# Patient Record
Sex: FEMALE | Race: WHITE | NOT HISPANIC OR LATINO | Employment: OTHER | URBAN - METROPOLITAN AREA
[De-identification: names, ages, dates, MRNs, and addresses within clinical notes are randomized per-mention and may not be internally consistent; named-entity substitution may affect disease eponyms.]

---

## 2017-01-04 ENCOUNTER — OFFICE VISIT (OUTPATIENT)
Dept: INTERNAL MEDICINE | Facility: CLINIC | Age: 67
End: 2017-01-04
Payer: MEDICARE

## 2017-01-04 VITALS
DIASTOLIC BLOOD PRESSURE: 86 MMHG | OXYGEN SATURATION: 95 % | HEIGHT: 61 IN | WEIGHT: 154.13 LBS | RESPIRATION RATE: 12 BRPM | HEART RATE: 67 BPM | BODY MASS INDEX: 29.1 KG/M2 | SYSTOLIC BLOOD PRESSURE: 124 MMHG

## 2017-01-04 DIAGNOSIS — E78.5 HYPERLIPIDEMIA, UNSPECIFIED HYPERLIPIDEMIA TYPE: ICD-10-CM

## 2017-01-04 DIAGNOSIS — Z86.2 HISTORY OF ANEMIA: ICD-10-CM

## 2017-01-04 DIAGNOSIS — R00.2 PALPITATIONS: ICD-10-CM

## 2017-01-04 DIAGNOSIS — Z00.00 HEALTH CARE MAINTENANCE: ICD-10-CM

## 2017-01-04 DIAGNOSIS — R41.3 MEMORY LOSS: ICD-10-CM

## 2017-01-04 DIAGNOSIS — K21.00 GASTROESOPHAGEAL REFLUX DISEASE WITH ESOPHAGITIS: ICD-10-CM

## 2017-01-04 DIAGNOSIS — I38 VALVULAR HEART DISEASE: Primary | ICD-10-CM

## 2017-01-04 PROCEDURE — 90670 PCV13 VACCINE IM: CPT | Mod: PBBFAC,PO | Performed by: INTERNAL MEDICINE

## 2017-01-04 PROCEDURE — 99999 PR PBB SHADOW E&M-EST. PATIENT-LVL III: CPT | Mod: PBBFAC,,, | Performed by: INTERNAL MEDICINE

## 2017-01-04 PROCEDURE — 99213 OFFICE O/P EST LOW 20 MIN: CPT | Mod: PBBFAC,PO | Performed by: INTERNAL MEDICINE

## 2017-01-04 PROCEDURE — 99214 OFFICE O/P EST MOD 30 MIN: CPT | Mod: S$PBB,,, | Performed by: INTERNAL MEDICINE

## 2017-01-04 RX ORDER — PANTOPRAZOLE SODIUM 40 MG/1
40 TABLET, DELAYED RELEASE ORAL DAILY
Qty: 90 TABLET | Refills: 1 | Status: SHIPPED | OUTPATIENT
Start: 2017-01-04 | End: 2017-08-02 | Stop reason: SDUPTHER

## 2017-01-04 NOTE — PROGRESS NOTES
HISTORY OF PRESENT ILLNESS:  PtMundo is a 66 y.o. female presents for monitoring of her elevated LFTs, anemia, memory loss.  She has seen Dr. Nassar for her memory loss.  She had done iron infusions with Dr. Weiss, last ferritin elevated.  Has been d/c'd from that clinic.  Her last LFTs had decreased slightly from previous.  She is also on ambien 5 mg po qhs prn for insominia and celexa 20 mg po q day.  Is on livalo and fibricor for her mixed hyperlipidemia.  She has been found to have a fatty liver on CT.  She has been having palpitations, sees Dr. Ferrara.  States palpitations noted when laying on side, occurring q 2 weeks.  Has mammogram scheduled.      Lab Results   Component Value Date    WBC 5.16 11/28/2016    HGB 13.7 11/28/2016    HCT 38.4 11/28/2016     11/28/2016    CHOL 176 03/01/2016    TRIG 133 03/01/2016    HDL 36 (L) 03/01/2016    ALT 54 (H) 11/28/2016    AST 34 11/28/2016     11/28/2016    K 4.1 11/28/2016     11/28/2016    CREATININE 1.1 11/28/2016    BUN 27 (H) 11/28/2016    CO2 25 11/28/2016    TSH 2.795 03/01/2016    INR 1.1 07/16/2012     Lab Results   Component Value Date    LDLCALC 113.4 03/01/2016           ROS:  GENERAL: No fever, chills, fatigability or weight loss.  SKIN: No rashes, itching or changes in color or texture of skin.  HEAD: No headaches or recent head trauma.  EARS: Denies ear pain, discharge or vertigo.  NOSE: No loss of smell, no epistaxis or postnasal drip.  MOUTH & THROAT: No hoarseness or change in voice. No excessive gum bleeding.  NODES: Denies swollen glands.  CHEST: Denies WEST, cyanosis, wheezing, cough and sputum production.  CARDIOVASCULAR: Denies chest pain, PND, orthopnea or reduced exercise tolerance; occ palpitations;  ABDOMEN: Appetite fine. No weight loss. Denies constipation, diarrhea, abdominal pain, hematemesis or blood in stool.  URINARY: No flank pain, dysuria or hematuria.  PERIPHERAL VASCULAR: No claudication or cyanosis. No  edema.  MUSCULOSKELETAL: No joint stiffness or swelling. Denies back pain.  NEUROLOGIC: Denies numbness    PE:   Vitals:   Vitals:    01/04/17 0959   BP: 124/86   Pulse: 67   Resp: 12     GENERAL: no acute distress, A&Ox3, comfortable.  Female with BMI of 29   HEENT: tympanic membranes clear, nasal mucosa pink, no pharyngeal erythema or exudate  NECK: supple, no cervical lymphadenopathy, no thyromegaly; no supraclavicular nodes;   CHEST:  clear to auscultation bilaterally, no crackles or wheeze; no increased work of breathing;  CARDIOVASCULAR: regular rate and rhythm, no rubs, murmurs or gallops.  ABDOMEN: normal bowel sounds, soft non-tender, non-distended; no palpable organomegaly;   EXT: no clubbing, cyanosis or edema.     ASSESSMENT/PLAN:    Valvular heart disease  -     Ambulatory referral to Cardiology    Palpitations  -     Ambulatory referral to Cardiology    Gastroesophageal reflux disease with esophagitis  -     pantoprazole (PROTONIX) 40 MG tablet; Take 1 tablet (40 mg total) by mouth once daily.  Dispense: 90 tablet; Refill: 1    Hyperlipidemia, unspecified hyperlipidemia type    History of anemia    Memory loss    Health care maintenance  -     Pneumococcal Conjugate Vaccine (13 Valent) (IM)      Will be following up with cardiology, Dr. Ferrara, he has labs ordered for Lipid panel;    She also has lab orders with Dr. Ferrara for followup for anemia;    Memory Loss: on celexa through Dr. Nassar;     Call if condition changes or worsens.

## 2017-01-04 NOTE — MR AVS SNAPSHOT
[facility]  [address] [facility] [address]  [facility] [address]  Phone: [phone]  Fax: [phone]                  Cindy Escobedo   2017 10:00 AM   Office Visit    Description:  Female : 1950   Provider:  Cindy Roman MD   Department:  [facility]           Reason for Visit     Follow-up           Diagnoses this Visit        Comments    Valvular heart disease    -  Primary     Palpitations         Gastroesophageal reflux disease with esophagitis         Health care maintenance         Hyperlipidemia, unspecified hyperlipidemia type         History of anemia                To Do List           Future Appointments        Provider Department Dept Phone    1/10/2017 2:45 PM [facility] [phone]    3/14/2017 10:00 AM Josh Ferrara MD [facility] [phone]      Goals (5 Years of Data)     None       These Medications        Disp Refills Start End    pantoprazole (PROTONIX) 40 MG tablet 90 tablet 1 2017    Take 1 tablet (40 mg total) by mouth once daily. - Oral    Pharmacy: [facility] [address] [place] [address] [address] [facility] [address] Ph #: [phone]         [facility]     [facility] On Call Nurse Care Line -  Assistance  Registered nurses in the [facility] On Call Center provide clinical advisement, health education, appointment booking, and other advisory services.  Call for this free service at [phone].             Medications           Message regarding Medications     Verify the changes and/or additions to your medication regime listed below are the same as discussed with your clinician today.  If any of these changes or additions are incorrect, please notify your healthcare provider.             Verify that the below list of medications is an accurate representation of the medications you are currently taking.  If none reported, the list may be  "blank. If incorrect, please contact your healthcare provider. Carry this list with you in case of emergency.           Current Medications     citalopram (CELEXA) 20 MG tablet 20 mg once daily.     fenofibric acid (FIBRICOR) 45 mg CpDR TAKE 1 CAPSULE BY MOUTH EVERY DAY    LIVALO 2 mg Tab tablet TAKE 1 TABLET BY MOUTH EVERY EVENING    metoprolol succinate (TOPROL-XL) 50 MG 24 hr tablet TAKE 1 TABLET BY MOUTH EVERY MORNING    multivitamin (THERAGRAN) per tablet Every day    pantoprazole (PROTONIX) 40 MG tablet Take 1 tablet (40 mg total) by mouth once daily.    valsartan-hydrochlorothiazide (DIOVAN-HCT) 320-12.5 mg per tablet TAKE 1 TABLET BY MOUTH EVERY MORNING    zolpidem (AMBIEN) 5 MG Tab nightly as needed.     ferrous gluconate (IRON HIGH POTENCY) 240 (27 FE) MG tablet Take 240 mg by mouth 3 (three) times daily with meals.    valsartan-hydrochlorothiazide (DIOVAN-HCT) 320-12.5 mg per tablet Take 1 tablet by mouth every morning. Needs appt for further refills. Appt due in March 2017           Clinical Reference Information           Vital Signs - Last Recorded  Most recent update: 1/4/2017 10:02 AM by Sveta Zavala LPN    BP Pulse Resp Ht Wt SpO2    124/86 (BP Location: Left arm, Patient Position: Sitting) 67 12 5' 1" (1.549 m) 69.9 kg (154 lb 1.6 oz) 95%    BMI                29.12 kg/m2          Blood Pressure          Most Recent Value    BP  124/86      Allergies as of 1/4/2017     Codeine    Ace Inhibitors    Cat Hair Extract    Epinephrine    Hydrocodone-acetaminophen    Lipitor [Atorvastatin]    Oxycodone Hcl-oxycodone-asa    Rosuvastatin    Sulfamethoxazole-trimethoprim    Terramycin  [Oxytetracycline]      Immunizations Administered on Date of Encounter - 1/4/2017     Name Date Dose VIS Date Route    Pneumococcal Conjugate - 13 Valent 1/4/2017 0.5 mL 11/5/2015 Intramuscular      Orders Placed During Today's Visit      Normal Orders This Visit    Ambulatory referral to Cardiology     Pneumococcal Conjugate " Vaccine (13 Valent) (IM)

## 2017-01-10 ENCOUNTER — HOSPITAL ENCOUNTER (OUTPATIENT)
Dept: RADIOLOGY | Facility: HOSPITAL | Age: 67
Discharge: HOME | End: 2017-01-10
Attending: INTERNAL MEDICINE
Payer: MEDICARE

## 2017-01-10 DIAGNOSIS — Z12.31 VISIT FOR SCREENING MAMMOGRAM: ICD-10-CM

## 2017-01-10 PROCEDURE — 77063 BREAST TOMOSYNTHESIS BI: CPT | Mod: 26,,, | Performed by: RADIOLOGY

## 2017-01-10 PROCEDURE — 77067 SCR MAMMO BI INCL CAD: CPT | Mod: TC

## 2017-01-10 PROCEDURE — 77067 SCR MAMMO BI INCL CAD: CPT | Mod: 26,,, | Performed by: RADIOLOGY

## 2017-03-06 RX ORDER — VALSARTAN AND HYDROCHLOROTHIAZIDE 320; 12.5 MG/1; MG/1
1 TABLET, FILM COATED ORAL EVERY MORNING
Qty: 90 TABLET | Refills: 0 | Status: SHIPPED | OUTPATIENT
Start: 2017-03-06 | End: 2017-03-14 | Stop reason: SDUPTHER

## 2017-03-14 ENCOUNTER — OFFICE VISIT (OUTPATIENT)
Dept: CARDIOLOGY | Facility: CLINIC | Age: 67
End: 2017-03-14
Payer: MEDICARE

## 2017-03-14 VITALS
HEIGHT: 61 IN | HEART RATE: 66 BPM | DIASTOLIC BLOOD PRESSURE: 82 MMHG | WEIGHT: 149.94 LBS | BODY MASS INDEX: 28.31 KG/M2 | SYSTOLIC BLOOD PRESSURE: 128 MMHG

## 2017-03-14 DIAGNOSIS — I72.6 VERTEBRAL ARTERY ANEURYSM: Primary | ICD-10-CM

## 2017-03-14 DIAGNOSIS — E78.5 DYSLIPIDEMIA: Chronic | ICD-10-CM

## 2017-03-14 DIAGNOSIS — R00.2 PALPITATIONS: Chronic | ICD-10-CM

## 2017-03-14 DIAGNOSIS — I65.23 BILATERAL CAROTID ARTERY STENOSIS: Chronic | ICD-10-CM

## 2017-03-14 PROCEDURE — 99213 OFFICE O/P EST LOW 20 MIN: CPT | Mod: PBBFAC,PO | Performed by: INTERNAL MEDICINE

## 2017-03-14 PROCEDURE — 99214 OFFICE O/P EST MOD 30 MIN: CPT | Mod: S$PBB,,, | Performed by: INTERNAL MEDICINE

## 2017-03-14 PROCEDURE — 99999 PR PBB SHADOW E&M-EST. PATIENT-LVL III: CPT | Mod: PBBFAC,,, | Performed by: INTERNAL MEDICINE

## 2017-03-14 RX ORDER — FENOFIBRIC ACID 45 MG/1
1 CAPSULE, DELAYED RELEASE ORAL DAILY
Qty: 90 CAPSULE | Refills: 4 | Status: SHIPPED | OUTPATIENT
Start: 2017-03-14 | End: 2018-04-09 | Stop reason: SDUPTHER

## 2017-03-14 RX ORDER — VALSARTAN AND HYDROCHLOROTHIAZIDE 320; 12.5 MG/1; MG/1
1 TABLET, FILM COATED ORAL EVERY MORNING
Qty: 90 TABLET | Refills: 4 | Status: SHIPPED | OUTPATIENT
Start: 2017-03-14 | End: 2018-04-23

## 2017-03-14 RX ORDER — PITAVASTATIN CALCIUM 2.09 MG/1
2 TABLET, FILM COATED ORAL NIGHTLY
Qty: 90 TABLET | Refills: 4 | Status: SHIPPED | OUTPATIENT
Start: 2017-03-14 | End: 2018-03-19 | Stop reason: SDUPTHER

## 2017-03-14 NOTE — LETTER
March 14, 2017      Cindy Roman MD  [address] [facility] [address] [place] [address]           [facility]  [address] [place] [address]  Phone: [phone]          Patient: Cindy Escobedo   MR Number: [id number]   YOB: 1950   Date of Visit: 3/14/2017       Dear Dr. Cindy oRman:    Thank you for referring Cindy Escobedo to me for evaluation. Attached you will find relevant portions of my assessment and plan of care.    If you have questions, please do not hesitate to call me. I look forward to following Cindy Escobedo along with you.    Sincerely,    Josh Ferrara MD    Enclosure  CC:  No Recipients    If you would like to receive this communication electronically, please contact [email or web address] or [phone] to request more information on [facility] access.    For providers and/or their staff who would like to refer a patient to [facility], please contact us through our one-stop-shop provider referral line, [facility], at [phone].    If you feel you have received this communication in error or would no longer like to receive these types of communications, please e-mail [email or web address]

## 2017-03-14 NOTE — PROGRESS NOTES
Subjective:    Patient ID:  Cindy Escobedo is a 67 y.o. female who presents for follow-up of Palpitations (Annual f/u  )      HPI   Ms. Escobedo here for follow up of palpitations, DLP, vertebral artery aneurysm.  Patients states is doing well no chest pain, SOB or change in exertional tolerence. Patient does not exercise but remains very active with out change in exertional tolerance or chest pain.     Review of Systems   Constitution: Negative for chills, decreased appetite, weakness and night sweats.   HENT: Negative for headaches and nosebleeds.    Eyes: Negative for blurred vision and visual disturbance.   Cardiovascular: Negative for chest pain, claudication, cyanosis, dyspnea on exertion, irregular heartbeat, leg swelling, near-syncope, orthopnea, palpitations, paroxysmal nocturnal dyspnea and syncope.   Respiratory: Negative for cough and shortness of breath.    Endocrine: Negative for polyuria.   Hematologic/Lymphatic: Does not bruise/bleed easily.   Skin: Negative for flushing and rash.   Musculoskeletal: Negative for arthritis, joint pain, muscle cramps and myalgias.   Gastrointestinal: Negative for abdominal pain, change in bowel habit and heartburn.   Genitourinary: Negative for bladder incontinence.   Neurological: Negative for dizziness, light-headedness and loss of balance.   Psychiatric/Behavioral: Negative for altered mental status.        Objective:    Physical Exam   Constitutional: She is oriented to person, place, and time. She appears well-developed and well-nourished.   HENT:   Head: Normocephalic.   Eyes: Conjunctivae are normal.   Neck: Normal range of motion. Neck supple. No JVD present.   Cardiovascular: Normal rate, regular rhythm, normal heart sounds and intact distal pulses.    Pulses:       Carotid pulses are 2+ on the right side, and 2+ on the left side.       Radial pulses are 2+ on the right side, and 2+ on the left side.        Dorsalis pedis pulses are 2+ on the right side, and 2+  on the left side.        Posterior tibial pulses are 2+ on the right side, and 2+ on the left side.   Pulmonary/Chest: Effort normal and breath sounds normal.   Abdominal: Soft. Bowel sounds are normal.   Musculoskeletal: She exhibits no edema or tenderness.   Neurological: She is alert and oriented to person, place, and time. Gait normal.   Skin: Skin is warm, dry and intact. No cyanosis. Nails show no clubbing.   Psychiatric: She has a normal mood and affect. Her speech is normal and behavior is normal. Thought content normal.             ..    Chemistry        Component Value Date/Time     11/28/2016 1015    K 4.1 11/28/2016 1015     11/28/2016 1015    CO2 25 11/28/2016 1015    BUN 27 (H) 11/28/2016 1015    CREATININE 1.1 11/28/2016 1015     (H) 11/28/2016 1015        Component Value Date/Time    CALCIUM 10.2 11/28/2016 1015    ALKPHOS 63 11/28/2016 1015    AST 34 11/28/2016 1015    ALT 54 (H) 11/28/2016 1015    BILITOT 1.1 (H) 11/28/2016 1015            ..  Lab Results   Component Value Date    CHOL 176 03/01/2016    CHOL 217 (H) 01/06/2015    CHOL 177 02/27/2014     Lab Results   Component Value Date    HDL 36 (L) 03/01/2016    HDL 39 (L) 01/06/2015    HDL 37 (L) 02/27/2014     Lab Results   Component Value Date    LDLCALC 113.4 03/01/2016    LDLCALC 147.4 01/06/2015    LDLCALC 106.2 02/27/2014     Lab Results   Component Value Date    TRIG 133 03/01/2016    TRIG 153 (H) 01/06/2015    TRIG 169 (H) 02/27/2014     Lab Results   Component Value Date    CHOLHDL 20.5 03/01/2016    CHOLHDL 18.0 (L) 01/06/2015    CHOLHDL 20.9 02/27/2014     ..  Lab Results   Component Value Date    WBC 5.16 11/28/2016    HGB 13.7 11/28/2016    HCT 38.4 11/28/2016    MCV 91 11/28/2016     11/28/2016       Test(s) Reviewed  I have reviewed the following in detail:  [] Stress test   [] Angiography   [x] Echocardiogram   [x] Labs   [x] Other:       Assessment:         ICD-10-CM ICD-9-CM   1. Vertebral artery  aneurysm I72.6 442.81   2. Bilateral carotid artery stenosis I65.23 433.10     433.30   3. Dyslipidemia E78.5 272.4   4. Palpitations R00.2 785.1     Problem List Items Addressed This Visit     Carotid artery stenosis Mild (Chronic)    Dyslipidemia (Chronic)    Palpitations (Chronic)    Overview     05/10/2001 all coronaries normal;          Vertebral artery aneurysm - Primary    Overview     7/2012 angio  4.5 millimeter diameter fusiform left V4 segment aneurysm  Minimal intracranial atherosclerosis                Plan:           Return to clinic 1 year   Aerobic exercise 5x's/wk. Heart healthy diet and risk factor modification.    See labs and med orders.  Has neuro f/u 4/17 eval ANA stenosis.

## 2017-03-14 NOTE — MR AVS SNAPSHOT
[facility]  [address]  [facility] [address]  Phone: [phone]                  Cindy Escobedo   3/14/2017 10:00 AM   Office Visit    Description:  Female : 1950   Provider:  Josh Ferrara MD   Department:  [facility]           Reason for Visit     Palpitations           Diagnoses this Visit        Comments    Vertebral artery aneurysm    -  Primary     Bilateral carotid artery stenosis         Dyslipidemia         Palpitations                To Do List           Goals (5 Years of Data)     None      Follow-Up and Disposition     Return in about 9 months (around 2017).       These Medications        Disp Refills Start End    fenofibric acid (FIBRICOR) 45 mg CpDR 90 capsule 4 3/14/2017     Take 1 capsule (45 mg total) by mouth once daily. - Oral    Pharmacy: [facility] [place] [address] AT [facility] [address] Ph #: [phone]       valsartan-hydrochlorothiazide (DIOVAN-HCT) 320-12.5 mg per tablet 90 tablet 4 3/14/2017     Take 1 tablet by mouth every morning. Needs appt for further refills. Appt due in 2017 - Oral    Pharmacy: [facility] [place] [address] AT [facility] OF [facility] [address] Ph #: [phone]       pitavastatin (LIVALO) 2 mg Tab tablet 90 tablet 4 3/14/2017     Take 1 tablet (2 mg total) by mouth every evening. - Oral    Pharmacy: [facility] [place] [address] AT [facility] [address] Ph #: [phone]         [facility] On Call     [facility] On Call Nurse Care Line -  Assistance  Registered nurses in the [facility] On Call Center provide clinical advisement, health education, appointment booking, and other advisory services.  Call for this free service at [phone].             Medications           Message regarding Medications     Verify the changes and/or additions to your medication regime listed below are the  "same as discussed with your clinician today.  If any of these changes or additions are incorrect, please notify your healthcare provider.        CHANGE how you are taking these medications     Start Taking Instead of    fenofibric acid (FIBRICOR) 45 mg CpDR fenofibric acid (FIBRICOR) 45 mg CpDR    Dosage:  Take 1 capsule (45 mg total) by mouth once daily. Dosage:  TAKE 1 CAPSULE BY MOUTH EVERY DAY    Reason for Change:  Reorder     pitavastatin (LIVALO) 2 mg Tab tablet LIVALO 2 mg Tab tablet    Dosage:  Take 1 tablet (2 mg total) by mouth every evening. Dosage:  TAKE 1 TABLET BY MOUTH EVERY EVENING    Reason for Change:  Reorder            Verify that the below list of medications is an accurate representation of the medications you are currently taking.  If none reported, the list may be blank. If incorrect, please contact your healthcare provider. Carry this list with you in case of emergency.           Current Medications     citalopram (CELEXA) 20 MG tablet 20 mg once daily.     fenofibric acid (FIBRICOR) 45 mg CpDR Take 1 capsule (45 mg total) by mouth once daily.    ferrous gluconate (IRON HIGH POTENCY) 240 (27 FE) MG tablet Take 240 mg by mouth 3 (three) times daily with meals.    metoprolol succinate (TOPROL-XL) 50 MG 24 hr tablet TAKE 1 TABLET BY MOUTH EVERY MORNING    multivitamin (THERAGRAN) per tablet Every day    pantoprazole (PROTONIX) 40 MG tablet Take 1 tablet (40 mg total) by mouth once daily.    pitavastatin (LIVALO) 2 mg Tab tablet Take 1 tablet (2 mg total) by mouth every evening.    valsartan-hydrochlorothiazide (DIOVAN-HCT) 320-12.5 mg per tablet Take 1 tablet by mouth every morning. Needs appt for further refills. Appt due in March 2017    zolpidem (AMBIEN) 5 MG Tab nightly as needed.            Clinical Reference Information           Your Vitals Were     BP Pulse Height Weight BMI    128/82 (BP Location: Left arm, Patient Position: Sitting, BP Method: Automatic) 66 5' 1" (1.549 m) 68 kg (149 " lb 14.6 oz) 28.33 kg/m2      Blood Pressure          Most Recent Value    BP  128/82      Allergies as of 3/14/2017     Codeine    Ace Inhibitors    Cat Hair Extract    Epinephrine    Hydrocodone-acetaminophen    Lipitor [Atorvastatin]    Oxycodone Hcl-oxycodone-asa    Rosuvastatin    Sulfamethoxazole-trimethoprim    Terramycin  [Oxytetracycline]      Immunizations Administered on Date of Encounter - 3/14/2017     None      Orders Placed During Today's Visit     Future Labs/Procedures Expected by Expires    CK  12/14/2017 3/15/2018    Comprehensive metabolic panel  12/14/2017 3/15/2018    Lipid panel  12/14/2017 3/15/2018      Language Assistance Services     ATTENTION: Language assistance services are available, free of charge. Please call [phone].      ATENCIÓN: Si nedla abbi, tiene a dumas disposición servicios gratuitos de asistencia lingüística. Llame al [phone].     CHÚ Ý: N?u b?n nói Ti?ng Vi?t, có các d?ch v? h? tr? ngôn ng? mi?n phí dành cho b?n. G?i s? [phone].         [facility] complies with applicable Federal civil rights laws and does not discriminate on the basis of race, color, national origin, age, disability, or sex.

## 2017-04-05 ENCOUNTER — HOSPITAL ENCOUNTER (OUTPATIENT)
Dept: RADIOLOGY | Facility: HOSPITAL | Age: 67
Discharge: HOME | End: 2017-04-05
Attending: NURSE PRACTITIONER
Payer: MEDICARE

## 2017-04-05 ENCOUNTER — OFFICE VISIT (OUTPATIENT)
Dept: FAMILY MEDICINE | Facility: CLINIC | Age: 67
End: 2017-04-05
Payer: MEDICARE

## 2017-04-05 VITALS
DIASTOLIC BLOOD PRESSURE: 96 MMHG | BODY MASS INDEX: 28.51 KG/M2 | HEIGHT: 61 IN | OXYGEN SATURATION: 96 % | WEIGHT: 151 LBS | HEART RATE: 70 BPM | SYSTOLIC BLOOD PRESSURE: 126 MMHG

## 2017-04-05 DIAGNOSIS — R10.9 LEFT SIDED ABDOMINAL PAIN: Primary | ICD-10-CM

## 2017-04-05 DIAGNOSIS — R10.9 LEFT SIDED ABDOMINAL PAIN: ICD-10-CM

## 2017-04-05 PROCEDURE — 74177 CT ABD & PELVIS W/CONTRAST: CPT | Mod: TC,PO

## 2017-04-05 PROCEDURE — 99213 OFFICE O/P EST LOW 20 MIN: CPT | Mod: PBBFAC,PO | Performed by: NURSE PRACTITIONER

## 2017-04-05 PROCEDURE — 25500020 PHARM REV CODE 255: Mod: PO | Performed by: NURSE PRACTITIONER

## 2017-04-05 PROCEDURE — 74177 CT ABD & PELVIS W/CONTRAST: CPT | Mod: 26,,, | Performed by: RADIOLOGY

## 2017-04-05 PROCEDURE — 99213 OFFICE O/P EST LOW 20 MIN: CPT | Mod: S$PBB,,, | Performed by: NURSE PRACTITIONER

## 2017-04-05 PROCEDURE — 99999 PR PBB SHADOW E&M-EST. PATIENT-LVL III: CPT | Mod: PBBFAC,,, | Performed by: NURSE PRACTITIONER

## 2017-04-05 RX ADMIN — IOHEXOL 75 ML: 350 INJECTION, SOLUTION INTRAVENOUS at 03:04

## 2017-04-05 RX ADMIN — IOHEXOL 30 ML: 350 INJECTION, SOLUTION INTRAVENOUS at 03:04

## 2017-04-05 NOTE — PROGRESS NOTES
Subjective:       Patient ID: Cindy Escobedo is a 67 y.o. female.    Chief Complaint: Abdominal Pain and Constipation    Abdominal Pain   This is a new (2-3 days) problem. The onset quality is sudden. The pain is located in the LUQ. The pain is moderate. The abdominal pain does not radiate. Associated symptoms include constipation (last normal BM yesterday). Pertinent negatives include no anorexia, arthralgias, belching, diarrhea, dysuria, fever, flatus, frequency, headaches, hematochezia, hematuria, melena, myalgias, nausea, vomiting or weight loss. Relieved by: TUMs  She has tried nothing for the symptoms.     Review of Systems   Constitutional: Negative for fever and weight loss.   Gastrointestinal: Positive for abdominal pain and constipation (last normal BM yesterday). Negative for anorexia, diarrhea, flatus, hematochezia, melena, nausea and vomiting.   Genitourinary: Negative for dysuria, frequency and hematuria.   Musculoskeletal: Negative for arthralgias and myalgias.   Neurological: Negative for headaches.       Objective:      Physical Exam   Constitutional: She is oriented to person, place, and time. She appears well-nourished.   Cardiovascular: Normal rate, regular rhythm, normal heart sounds and intact distal pulses.    Pulmonary/Chest: Effort normal and breath sounds normal. She has no wheezes. She has no rales.   Abdominal: Soft. Bowel sounds are normal. There is tenderness in the left upper quadrant. There is no rigidity, no rebound, no guarding and no CVA tenderness.   Neurological: She is alert and oriented to person, place, and time.   Skin: Skin is warm. No rash noted.   Vitals reviewed.      Assessment:       1. Left sided abdominal pain        Plan:       Cindy was seen today for abdominal pain and constipation.    Diagnoses and all orders for this visit:    Left sided abdominal pain  -     CT Abdomen Pelvis W Wo Contrast; Future  -     Basic metabolic panel; Future  Monitor diet and avoid  triggers  Adequate rest and hydration  ER for increasing or worsening symptoms

## 2017-06-12 RX ORDER — VALSARTAN AND HYDROCHLOROTHIAZIDE 320; 12.5 MG/1; MG/1
TABLET, FILM COATED ORAL
Qty: 90 TABLET | Refills: 0 | Status: SHIPPED | OUTPATIENT
Start: 2017-06-12 | End: 2017-08-23 | Stop reason: SDUPTHER

## 2017-06-21 ENCOUNTER — OFFICE VISIT (OUTPATIENT)
Dept: OPTOMETRY | Facility: CLINIC | Age: 67
End: 2017-06-21
Payer: MEDICARE

## 2017-06-21 DIAGNOSIS — H25.13 NUCLEAR SCLEROSIS, BILATERAL: Primary | ICD-10-CM

## 2017-06-21 PROCEDURE — 99999 PR PBB SHADOW E&M-EST. PATIENT-LVL II: CPT | Mod: PBBFAC,,, | Performed by: OPTOMETRIST

## 2017-06-21 PROCEDURE — 92014 COMPRE OPH EXAM EST PT 1/>: CPT | Mod: S$PBB,,, | Performed by: OPTOMETRIST

## 2017-06-21 PROCEDURE — 99212 OFFICE O/P EST SF 10 MIN: CPT | Mod: PBBFAC,PO | Performed by: OPTOMETRIST

## 2017-06-21 NOTE — PROGRESS NOTES
HPI     Routine eye exam--dle--6/2016    Pt denies any problems since last visit-no va changes--forgot glasses at   home today. Denies eye pain, flashes and floaters.    Last edited by Mignon Pavon on 6/21/2017  8:31 AM. (History)        ROS     Negative for: Constitutional, Gastrointestinal, Neurological, Skin,   Genitourinary, Musculoskeletal, HENT, Endocrine, Cardiovascular, Eyes,   Respiratory, Psychiatric, Allergic/Imm, Heme/Lymph    Last edited by Tej Kirby, OD on 6/21/2017  9:02 AM. (History)        Assessment /Plan     For exam results, see Encounter Report.    Nuclear sclerosis, bilateral      1. Educated pt on presence of cataracts and effects on vision. No surgery at this time. Recheck in one year.

## 2017-08-01 DIAGNOSIS — K21.00 GASTROESOPHAGEAL REFLUX DISEASE WITH ESOPHAGITIS: ICD-10-CM

## 2017-08-02 RX ORDER — PANTOPRAZOLE SODIUM 40 MG/1
TABLET, DELAYED RELEASE ORAL
Qty: 90 TABLET | Refills: 0 | Status: SHIPPED | OUTPATIENT
Start: 2017-08-02 | End: 2017-12-16 | Stop reason: SDUPTHER

## 2017-08-17 ENCOUNTER — OFFICE VISIT (OUTPATIENT)
Dept: GASTROENTEROLOGY | Facility: CLINIC | Age: 67
End: 2017-08-17
Payer: MEDICARE

## 2017-08-17 VITALS — WEIGHT: 156 LBS | HEIGHT: 60 IN | BODY MASS INDEX: 30.63 KG/M2

## 2017-08-17 DIAGNOSIS — K22.70 BARRETT'S ESOPHAGUS WITHOUT DYSPLASIA: ICD-10-CM

## 2017-08-17 DIAGNOSIS — R13.19 ESOPHAGEAL DYSPHAGIA: ICD-10-CM

## 2017-08-17 DIAGNOSIS — K21.9 GASTROESOPHAGEAL REFLUX DISEASE, ESOPHAGITIS PRESENCE NOT SPECIFIED: Primary | ICD-10-CM

## 2017-08-17 PROCEDURE — 99999 PR PBB SHADOW E&M-EST. PATIENT-LVL II: CPT | Mod: PBBFAC,,, | Performed by: INTERNAL MEDICINE

## 2017-08-17 PROCEDURE — 1126F AMNT PAIN NOTED NONE PRSNT: CPT | Mod: ,,, | Performed by: INTERNAL MEDICINE

## 2017-08-17 PROCEDURE — 99212 OFFICE O/P EST SF 10 MIN: CPT | Mod: PBBFAC,PO | Performed by: INTERNAL MEDICINE

## 2017-08-17 PROCEDURE — 99213 OFFICE O/P EST LOW 20 MIN: CPT | Mod: S$PBB,,, | Performed by: INTERNAL MEDICINE

## 2017-08-17 PROCEDURE — 1159F MED LIST DOCD IN RCRD: CPT | Mod: ,,, | Performed by: INTERNAL MEDICINE

## 2017-08-17 NOTE — PROGRESS NOTES
Pt presents for f/u GERD/Maldonado's. Pt describes frequent coughing spells past several years. Positive intermittent dysphagia, mainly solids. No vomiting. Taking protonix. No fever or jaundice. No bleeding. Appetite and weight stable. Last EGD 2014 notable for reflux esophagitis with Maldonado's, LE ring (dilated), and gastritis. Pt received letter due for f/u EGD.    REVIEW OF SYSTEMS:   Constitutional: Negative for fever, appetite change and unexpected weight change.  HENT: Negative for sore throat and trouble swallowing.  Eyes: Negative for visual disturbance.  Respiratory: Negative for chest tightness, shortness of breath and wheezing.  Cardiovascular: Negative for chest pain.  Gastrointestinal:  as per HPI    PHYSICAL EXAMINATION:                                                        GENERAL:  Comfortable, in no acute distress.      SKIN: Non-jaundiced.                             HEENT EXAM:  Nonicteric.  No adenopathy.  Oropharynx is clear.               NECK:  Supple.                                                               LUNGS:  Clear.                                                               CARDIAC:  Regular rate and rhythm.  S1, S2.  No murmur.                      ABDOMEN:  Soft, positive bowel sounds, nontender.  No hepatosplenomegaly or masses.  No rebound or guarding.                                             EXTREMITIES:  No edema.       IMP: 1. GERD with Maldonado's esophagus.          2. Dysphagia          3. Hx LE ring          4. Cough - not sure if related to reflux.    PLAN: 1. EGD/biopsies/dilation.             2. Likely try different PPI following exam.

## 2017-08-29 ENCOUNTER — ANESTHESIA EVENT (OUTPATIENT)
Dept: ENDOSCOPY | Facility: HOSPITAL | Age: 67
End: 2017-08-29
Payer: MEDICARE

## 2017-08-29 ENCOUNTER — HOSPITAL ENCOUNTER (OUTPATIENT)
Facility: HOSPITAL | Age: 67
Discharge: HOME | End: 2017-08-29
Attending: INTERNAL MEDICINE | Admitting: INTERNAL MEDICINE
Payer: MEDICARE

## 2017-08-29 ENCOUNTER — SURGERY (OUTPATIENT)
Age: 67
End: 2017-08-29

## 2017-08-29 ENCOUNTER — ANESTHESIA (OUTPATIENT)
Dept: ENDOSCOPY | Facility: HOSPITAL | Age: 67
End: 2017-08-29
Payer: MEDICARE

## 2017-08-29 VITALS
SYSTOLIC BLOOD PRESSURE: 112 MMHG | RESPIRATION RATE: 17 BRPM | WEIGHT: 150 LBS | BODY MASS INDEX: 29.45 KG/M2 | HEIGHT: 60 IN | HEART RATE: 55 BPM | OXYGEN SATURATION: 95 % | RESPIRATION RATE: 18 BRPM | TEMPERATURE: 97 F | DIASTOLIC BLOOD PRESSURE: 67 MMHG

## 2017-08-29 DIAGNOSIS — R13.10 DYSPHAGIA: ICD-10-CM

## 2017-08-29 PROCEDURE — 37000008 HC ANESTHESIA 1ST 15 MINUTES: Mod: PO | Performed by: INTERNAL MEDICINE

## 2017-08-29 PROCEDURE — 43248 EGD GUIDE WIRE INSERTION: CPT | Mod: ,,, | Performed by: INTERNAL MEDICINE

## 2017-08-29 PROCEDURE — 88305 TISSUE EXAM BY PATHOLOGIST: CPT | Mod: 26,,, | Performed by: PATHOLOGY

## 2017-08-29 PROCEDURE — D9220A PRA ANESTHESIA: Mod: CRNA,,, | Performed by: NURSE ANESTHETIST, CERTIFIED REGISTERED

## 2017-08-29 PROCEDURE — 43239 EGD BIOPSY SINGLE/MULTIPLE: CPT | Mod: PO | Performed by: INTERNAL MEDICINE

## 2017-08-29 PROCEDURE — 27201012 HC FORCEPS, HOT/COLD, DISP: Mod: PO | Performed by: INTERNAL MEDICINE

## 2017-08-29 PROCEDURE — 43239 EGD BIOPSY SINGLE/MULTIPLE: CPT | Mod: 51,,, | Performed by: INTERNAL MEDICINE

## 2017-08-29 PROCEDURE — 63600175 PHARM REV CODE 636 W HCPCS: Mod: PO | Performed by: NURSE ANESTHETIST, CERTIFIED REGISTERED

## 2017-08-29 PROCEDURE — 37000009 HC ANESTHESIA EA ADD 15 MINS: Mod: PO | Performed by: INTERNAL MEDICINE

## 2017-08-29 PROCEDURE — 25000003 PHARM REV CODE 250: Mod: PO | Performed by: INTERNAL MEDICINE

## 2017-08-29 PROCEDURE — 43248 EGD GUIDE WIRE INSERTION: CPT | Mod: PO | Performed by: INTERNAL MEDICINE

## 2017-08-29 PROCEDURE — D9220A PRA ANESTHESIA: Mod: ANES,,, | Performed by: ANESTHESIOLOGY

## 2017-08-29 PROCEDURE — 88305 TISSUE EXAM BY PATHOLOGIST: CPT | Performed by: PATHOLOGY

## 2017-08-29 RX ORDER — SODIUM CHLORIDE, SODIUM LACTATE, POTASSIUM CHLORIDE, CALCIUM CHLORIDE 600; 310; 30; 20 MG/100ML; MG/100ML; MG/100ML; MG/100ML
INJECTION, SOLUTION INTRAVENOUS CONTINUOUS
Status: DISCONTINUED | OUTPATIENT
Start: 2017-08-29 | End: 2017-08-29 | Stop reason: HOSPADM

## 2017-08-29 RX ORDER — SODIUM CHLORIDE, SODIUM LACTATE, POTASSIUM CHLORIDE, CALCIUM CHLORIDE 600; 310; 30; 20 MG/100ML; MG/100ML; MG/100ML; MG/100ML
INJECTION, SOLUTION INTRAVENOUS CONTINUOUS
Status: DISCONTINUED | OUTPATIENT
Start: 2017-08-29 | End: 2017-08-29

## 2017-08-29 RX ORDER — PROPOFOL 10 MG/ML
VIAL (ML) INTRAVENOUS
Status: DISCONTINUED | OUTPATIENT
Start: 2017-08-29 | End: 2017-08-29

## 2017-08-29 RX ORDER — LIDOCAINE HYDROCHLORIDE 10 MG/ML
1 INJECTION, SOLUTION EPIDURAL; INFILTRATION; INTRACAUDAL; PERINEURAL ONCE
Status: DISCONTINUED | OUTPATIENT
Start: 2017-08-29 | End: 2017-08-29 | Stop reason: HOSPADM

## 2017-08-29 RX ORDER — LIDOCAINE HCL/PF 100 MG/5ML
SYRINGE (ML) INTRAVENOUS
Status: DISCONTINUED | OUTPATIENT
Start: 2017-08-29 | End: 2017-08-29

## 2017-08-29 RX ADMIN — PROPOFOL 30 MG: 10 INJECTION, EMULSION INTRAVENOUS at 10:08

## 2017-08-29 RX ADMIN — PROPOFOL 30 MG: 10 INJECTION, EMULSION INTRAVENOUS at 11:08

## 2017-08-29 RX ADMIN — LIDOCAINE HYDROCHLORIDE 100 MG: 20 INJECTION, SOLUTION INTRAVENOUS at 10:08

## 2017-08-29 RX ADMIN — SODIUM CHLORIDE, SODIUM LACTATE, POTASSIUM CHLORIDE, AND CALCIUM CHLORIDE: .6; .31; .03; .02 INJECTION, SOLUTION INTRAVENOUS at 10:08

## 2017-08-29 NOTE — ANESTHESIA POSTPROCEDURE EVALUATION
Anesthesia Post Evaluation    Patient: Cindy Escobedo    Procedure(s) Performed: Procedure(s) (LRB):  ESOPHAGOGASTRODUODENOSCOPY (EGD) (N/A)    Final Anesthesia Type: general  Patient location during evaluation: PACU  Patient participation: Yes- Able to Participate  Level of consciousness: awake and alert  Post-procedure vital signs: reviewed and stable  Pain management: adequate  Airway patency: patent  PONV status at discharge: No PONV  Anesthetic complications: no      Cardiovascular status: hemodynamically stable and blood pressure returned to baseline  Respiratory status: unassisted, spontaneous ventilation and room air  Hydration status: euvolemic  Follow-up not needed.        Visit Vitals  /67 (BP Location: Left arm, Patient Position: Lying)   Pulse (!) 55   Temp 36.2 °C (97.2 °F)   Resp 18   Ht 5' (1.524 m)   Wt 68 kg (150 lb)   SpO2 95%   Breastfeeding? No   BMI 29.29 kg/m²       Pain/Paulina Score: Pain Assessment Performed: Yes (8/29/2017 11:13 AM)  Presence of Pain: denies (8/29/2017 11:13 AM)  Paulina Score: 9 (8/29/2017 11:13 AM)

## 2017-08-29 NOTE — DISCHARGE INSTRUCTIONS
Preprinted instructions given to Pt     Procedural Sedation  Procedural sedation is medicine to ease discomfort, pain, and anxiety during a procedure. The medicine is often given through an intravenous (IV) line in your arm or hand. In some cases, the medicine may be taken by mouth or inhaled. While you are under sedation, you will likely be awake. But you may not remember anything afterward.  Why procedural sedation is used  Sedation is used for many types of procedures. The goal is to reduce pain, anxiety, and stressful memories of a procedure. It can also help your health care provider treat you. For example, having a broken bone fixed may be easier if you feel relaxed.  Procedural sedation is used only for short, basic procedures. It is not used for complex surgeries. Some procedures that use this type of sedation include:  · Dental surgery  · Breast biopsy, to take a sample of breast tissue  · Endoscopy, to look at gastrointestinal problems  · Bronchoscopy, to check for lung problems  · Bone or joint realignment, to fix a broken bone or dislocated joint  · Minor foot or skin surgery  · Electrical cardioversion, to restore a normal heart rhythm  · Lumbar puncture, to assess neurological disease  Risks of procedural sedation  Procedural sedation has some risks and possible side effects, such as:  · Headache  · Nausea and vomiting  · Unpleasant memory of the procedure  · Lowered rate of breathing  · Changes in heart rate and blood pressure (rare)  · Inhalation of stomach contents into your lungs (rare)  Side effects will likely go away shortly after the procedure. Your health care team will watch your heart rate and breathing during your sedation. This is to help prevent problems.  Your own risks may vary based on your age and your overall health. They also depend on the type of sedation you are given. Talk with your health care provider about the risks that apply most to you.  Getting ready for procedural  sedation  Talk with your health care provider how to get ready for your procedure. Tell him or her about all the medicines you take. This includes over-the-counter medicines such as ibuprofen. It also includes vitamins, herbs, and other supplements. You may need to stop taking some medicines before the procedure, such as blood thinners and aspirin. If you smoke, you may need to stop. Talk with your health care provider if you need help to stop smoking.  Tell your health care provider if you:  · Have had any problems in the past with sedation or anesthesia  · Have had any recent changes in your health, such as an infection or fever  · Are pregnant or think you may be pregnant  Also, make sure to:  · Ask a family member or friend to take you home after the procedure. You cannot drive on the day you receive sedation.  · Not eat or drink after midnight the night before your procedure, if advised.  · Follow all other instructions from your health care provider.  During your procedural sedation  You may have your procedure in a hospital or a medical clinic. Sedation is done by a trained health care provider. In general, you can expect the following:  · You will be given medicine through an IV line in your arm or hand. Or you may receive a shot. The medicine may also be given by mouth. Or you may inhale it through a mask.  · If you receive medicine through an IV, you may feel the effects very quickly. You will start to feel relaxed and drowsy.  · During the procedure, your heart rate, breathing, and blood pressure will be closely watched. Your breathing and blood pressure may decrease a little. But you will likely not need help with your breathing. You may receive a little extra oxygen through a mask.  · You will probably be awake the entire time. If you do fall asleep, you should be easy to wake up, if needed. You should feel little or no pain.  · When your procedure is over, the sedative medicine will be stopped.  After  your procedural sedation  You will begin to feel more awake and aware. But you will likely be drowsy for a while afterward. You will be closely watched as you become more alert. You may have a faint memory of the procedure. Or you may not remember it at all.  You should be able to return home within an hour or two after your procedure. Plan to have someone stay with you for a few hours. Side effects such as headache and nausea may go away quickly. Tell your health care provider if they continue.  Dont drive or make any important decisions for at least 24 hours. Be sure to follow all after-care instructions.     When to call your health care provider  Have someone call your health care provider right away if you have any of these:  · Drowsiness that gets worse  · Weakness or dizziness that gets worse  · Repeated vomiting  · You cant be awakened   Date Last Reviewed: 2/6/2015  © 5880-2371 [employer]. [address] [place] [address], [place] [address]. All rights reserved. This information is not intended as a substitute for professional medical care. Always follow your healthcare professional's instructions.

## 2017-08-29 NOTE — TRANSFER OF CARE
Anesthesia Transfer of Care Note    Patient: Cindy Escobedo    Procedure(s) Performed: Procedure(s) (LRB):  ESOPHAGOGASTRODUODENOSCOPY (EGD) (N/A)    Patient location: PACU    Anesthesia Type: general    Transport from OR: Transported from OR on room air with adequate spontaneous ventilation    Post pain: adequate analgesia    Post assessment: no apparent anesthetic complications and tolerated procedure well    Post vital signs: stable    Level of consciousness: awake and alert    Nausea/Vomiting: no nausea/vomiting    Complications: none    Transfer of care protocol was followed      Last vitals:   Visit Vitals  /70 (BP Location: Right arm, Patient Position: Lying)   Pulse 63   Temp 36.6 °C (97.9 °F) (Skin)   Resp 18   Ht 5' (1.524 m)   Wt 68 kg (150 lb)   SpO2 96%   Breastfeeding? No   BMI 29.29 kg/m²

## 2017-08-29 NOTE — H&P
"History & Physical - Short Stay  Gastroenterology      SUBJECTIVE:     Procedure: EGD    Chief Complaint/Indication for Procedure: Dysphagia and Reflux    PTA Medications   Medication Sig    fenofibric acid (FIBRICOR) 45 mg CpDR Take 1 capsule (45 mg total) by mouth once daily.    metoprolol succinate (TOPROL-XL) 50 MG 24 hr tablet TAKE 1 TABLET BY MOUTH EVERY MORNING    multivitamin (THERAGRAN) per tablet Every day    pantoprazole (PROTONIX) 40 MG tablet TAKE 1 TABLET BY MOUTH ONCE DAILY.    pitavastatin (LIVALO) 2 mg Tab tablet Take 1 tablet (2 mg total) by mouth every evening.    valsartan-hydrochlorothiazide (DIOVAN-HCT) 320-12.5 mg per tablet Take 1 tablet by mouth every morning. Needs appt for further refills. Appt due in March 2017    zolpidem (AMBIEN) 5 MG Tab nightly as needed.        Review of patient's allergies indicates:   Allergen Reactions    Codeine Anaphylaxis    Ace inhibitors      Other reaction(s): cough    Cat hair extract      Other reaction(s): Sneezing  Other reaction(s): Rash    Epinephrine      Other reaction(s): Tachycardia    Hydrocodone-acetaminophen      Other reaction(s): Anaphylaxis    Lipitor [atorvastatin]      Myalgias      Oxycodone hcl-oxycodone-asa      Other reaction(s): Agitation    Rosuvastatin      Other reaction(s): Muscle pain    Sulfamethoxazole-trimethoprim      Other reaction(s): "Cracked lips"    Terramycin  [oxytetracycline]      Other reaction(s): Fainting        Past Medical History:   Diagnosis Date    Allergy     Cystic disease of breast     Depression     Esophageal web     HEARING LOSS     HTN (hypertension)     Hyperlipidemia     Osteoarthritis     elevated CRP    Primary progressive aphasia     TIA (transient ischemic attack)      Past Surgical History:   Procedure Laterality Date    ADENOIDECTOMY      APPENDECTOMY      arthroscopy of knee      left    BLADDER SUSPENSION  1993    CARPAL TUNNEL RELEASE      right    cervcial " fusion      anterior C4-5/C6-7    CERVICAL FUSION      seen Dr. Raymond patricia removed-RUL      CHOLECYSTECTOMY      COLONOSCOPY  3/2013    DILATION AND CURETTAGE OF UTERUS      esophageal hernia repair      ESOPHAGUS SURGERY      HYSTERECTOMY      due to excessive bleeding    JOINT REPLACEMENT      partial left knee    SINUS SURGERY      TONSILLECTOMY      TONSILLECTOMY, ADENOIDECTOMY, BILATERAL MYRINGOTOMY AND TUBES      UVULOPALATOPHARYNGOPLASTY AND SEPTOPLASTY      VAGINAL DELIVERY      times 2     Family History   Problem Relation Age of Onset    COPD Mother     Stroke Father      cerebral hemorrhage    Breast cancer Maternal Grandmother     Cancer Maternal Grandmother     Glaucoma Sister     Uveitis Sister     Cataracts Sister     Uveitis Sister     Amblyopia Neg Hx     Blindness Neg Hx     Diabetes Neg Hx     Hypertension Neg Hx     Macular degeneration Neg Hx     Retinal detachment Neg Hx     Strabismus Neg Hx     Thyroid disease Neg Hx      Social History   Substance Use Topics    Smoking status: Former Smoker     Quit date: 7/16/1974    Smokeless tobacco: Never Used      Comment: quit in high school    Alcohol use No         OBJECTIVE:     Vital Signs (Most Recent)  Temp: 97.9 °F (36.6 °C) (08/29/17 1037)  Pulse: 63 (08/29/17 1037)  Resp: 18 (08/29/17 1037)  BP: 116/70 (08/29/17 1037)  SpO2: 96 % (08/29/17 1037)    Physical Exam:                                                       GENERAL:  Comfortable, in no acute distress.                                 HEENT EXAM:  Nonicteric.  No adenopathy.  Oropharynx is clear.               NECK:  Supple.                                                               LUNGS:  Clear.                                                               CARDIAC:  Regular rate and rhythm.  S1, S2.  No murmur.                      ABDOMEN:  Soft, positive bowel sounds, nontender.  No hepatosplenomegaly or masses.  No rebound or guarding.                                              EXTREMITIES:  No edema.     MENTAL STATUS:  Normal, alert and oriented.      ASSESSMENT/PLAN:     Assessment: Dysphagia and Reflux    Plan: EGD    Anesthesia Plan: General    ASA Grade: ASA 2 - Patient with mild systemic disease with no functional limitations    MALLAMPATI SCORE:  I (soft palate, uvula, fauces, and tonsillar pillars visible)

## 2017-08-29 NOTE — ANESTHESIA PREPROCEDURE EVALUATION
08/29/2017  Cindy Escobedo is a 67 y.o., female.    Anesthesia Evaluation      I have reviewed the Medications.     Review of Systems  Anesthesia Hx:  No problems with previous Anesthesia   Social:  Non-Smoker, No Alcohol Use    Cardiovascular:   Hypertension hyperlipidemia    Pulmonary:  Pulmonary Normal    Renal/:  Renal/ Normal     Hepatic/GI:   GERD    Neurological:   TIA,    Endocrine:  Endocrine Normal    Psych:   Psychiatric History          Physical Exam  General:  Well nourished    Airway/Jaw/Neck:  Airway Findings: Mouth Opening: Normal General Airway Assessment: Adult  Mallampati: II  Jaw/Neck Findings:  Neck ROM: Extension Decreased, Mild       Chest/Lungs:  Chest/Lungs Findings: Clear to auscultation, Normal Respiratory Rate     Heart/Vascular:  Heart Findings: Rate: Normal  Rhythm: Regular Rhythm  Sounds: Normal  Heart murmur: negative Vascular Findings: Normal (No carotid bruits.)       Mental Status:  Mental Status Findings:  Cooperative, Alert and Oriented         Anesthesia Plan  Type of Anesthesia, risks & benefits discussed:  Anesthesia Type:  general  Patient's Preference:   Intra-op Monitoring Plan:   Intra-op Monitoring Plan Comments:   Post Op Pain Control Plan:   Post Op Pain Control Plan Comments:   Induction:   IV  Beta Blocker:  Patient is not currently on a Beta-Blocker (No further documentation required).       Informed Consent: Patient understands risks and agrees with Anesthesia plan.  Questions answered. Anesthesia consent signed with patient.  ASA Score: 2     Day of Surgery Review of History & Physical:        Anesthesia Plan Notes: Propofol general.        Ready For Surgery From Anesthesia Perspective.

## 2017-08-29 NOTE — DISCHARGE SUMMARY
Discharge Note  Short Stay      SUMMARY     Admit Date: 8/29/2017    Attending Physician: Wicho Rico MD     Discharge Physician: Wicho Rico MD    Discharge Date: 8/29/2017 11:10 AM    Final Diagnosis: Dysphagia, unspecified type [R13.10]  Reflux esophagitis [K21.0]    Disposition: HOME OR SELF CARE    Patient Instructions:   Current Discharge Medication List      START taking these medications    Details   ranitidine (ZANTAC) 300 MG tablet Take 1 tablet (300 mg total) by mouth every evening.  Qty: 30 tablet, Refills: 2         CONTINUE these medications which have NOT CHANGED    Details   fenofibric acid (FIBRICOR) 45 mg CpDR Take 1 capsule (45 mg total) by mouth once daily.  Qty: 90 capsule, Refills: 4    Associated Diagnoses: Vertebral artery aneurysm; Bilateral carotid artery stenosis; Dyslipidemia; Palpitations      metoprolol succinate (TOPROL-XL) 50 MG 24 hr tablet TAKE 1 TABLET BY MOUTH EVERY MORNING  Qty: 90 tablet, Refills: 6      multivitamin (THERAGRAN) per tablet Every day      pantoprazole (PROTONIX) 40 MG tablet TAKE 1 TABLET BY MOUTH ONCE DAILY.  Qty: 90 tablet, Refills: 0    Associated Diagnoses: Gastroesophageal reflux disease with esophagitis      pitavastatin (LIVALO) 2 mg Tab tablet Take 1 tablet (2 mg total) by mouth every evening.  Qty: 90 tablet, Refills: 4    Associated Diagnoses: Vertebral artery aneurysm; Bilateral carotid artery stenosis; Dyslipidemia; Palpitations      valsartan-hydrochlorothiazide (DIOVAN-HCT) 320-12.5 mg per tablet Take 1 tablet by mouth every morning. Needs appt for further refills. Appt due in March 2017  Qty: 90 tablet, Refills: 4    Associated Diagnoses: Vertebral artery aneurysm; Bilateral carotid artery stenosis; Dyslipidemia; Palpitations      zolpidem (AMBIEN) 5 MG Tab nightly as needed.   Refills: 5             Discharge Procedure Orders (must include Diet, Follow-up, Activity)    Follow Up:  Follow up with PCP as previously scheduled  Resume  routine diet.  Activity as tolerated.    No driving day of procedure.

## 2017-09-13 RX ORDER — METOPROLOL SUCCINATE 50 MG/1
TABLET, EXTENDED RELEASE ORAL
Qty: 90 TABLET | Refills: 4 | Status: SHIPPED | OUTPATIENT
Start: 2017-09-13 | End: 2017-12-11

## 2017-09-18 ENCOUNTER — PATIENT MESSAGE (OUTPATIENT)
Dept: GASTROENTEROLOGY | Facility: CLINIC | Age: 67
End: 2017-09-18

## 2017-09-22 NOTE — TELEPHONE ENCOUNTER
----- Message from John NEVILLE Polina sent at 9/22/2017  3:32 PM CDT -----  Contact: Joel/Pamela Maldonado called in regarding the attached patient and stated Dr. Roman has patient on Protonix but Dr. Rico just put her on Zantac 300 mg.  Is Dr. Roman OK with patient taking both?      [facility] [address] [place] [address] [id number] [place] [address] AT [facility] [place] [facility] [address]  [address] [place] [id number]  Phone: [phone] Fax: [phone]

## 2017-09-22 NOTE — TELEPHONE ENCOUNTER
Dr Rico  Do you wish pt to remain on protonix with the new rx of zantac? Pharmacy called to confirm.

## 2017-09-26 ENCOUNTER — PATIENT MESSAGE (OUTPATIENT)
Dept: FAMILY MEDICINE | Facility: CLINIC | Age: 67
End: 2017-09-26

## 2017-09-26 NOTE — TELEPHONE ENCOUNTER
----- Message from Ana Chopra sent at 9/25/2017  4:48 PM CDT -----  Contact: Joel pritchett/ [facility]  Joel pritchett/ [facility] calling in regards to following up on Protonix from Dr Roman, but Dr Rico prescribed Zantac 300 mg. They are similar drugs. The patient is at the Pharmacy now. Please advise. Call to pod. No answer.  Call back   Thanks!    [facility] [address] [place] [address] [id number] [place] [address] AT [facility] [address]  [id number] [place] [id number]  Phone: [phone] Fax: [phone]

## 2017-10-23 RX ORDER — VALSARTAN AND HYDROCHLOROTHIAZIDE 320; 12.5 MG/1; MG/1
TABLET, FILM COATED ORAL
Qty: 90 TABLET | Refills: 4 | Status: SHIPPED | OUTPATIENT
Start: 2017-10-23 | End: 2017-12-11

## 2017-12-06 ENCOUNTER — LAB VISIT (OUTPATIENT)
Dept: LAB | Facility: HOSPITAL | Age: 67
End: 2017-12-06
Attending: INTERNAL MEDICINE
Payer: MEDICARE

## 2017-12-06 DIAGNOSIS — I65.23 BILATERAL CAROTID ARTERY STENOSIS: Chronic | ICD-10-CM

## 2017-12-06 DIAGNOSIS — R00.2 PALPITATIONS: Chronic | ICD-10-CM

## 2017-12-06 DIAGNOSIS — I72.6 VERTEBRAL ARTERY ANEURYSM: ICD-10-CM

## 2017-12-06 DIAGNOSIS — E78.5 DYSLIPIDEMIA: Chronic | ICD-10-CM

## 2017-12-06 LAB
ALBUMIN SERPL BCP-MCNC: 4.2 G/DL
ALP SERPL-CCNC: 65 U/L
ALT SERPL W/O P-5'-P-CCNC: 71 U/L
ANION GAP SERPL CALC-SCNC: 9 MMOL/L
AST SERPL-CCNC: 45 U/L
BILIRUB SERPL-MCNC: 1.1 MG/DL
BUN SERPL-MCNC: 23 MG/DL
CALCIUM SERPL-MCNC: 10.1 MG/DL
CHLORIDE SERPL-SCNC: 108 MMOL/L
CHOLEST SERPL-MCNC: 188 MG/DL
CHOLEST/HDLC SERPL: 5.1 {RATIO}
CK SERPL-CCNC: 73 U/L
CO2 SERPL-SCNC: 25 MMOL/L
CREAT SERPL-MCNC: 1.1 MG/DL
EST. GFR  (AFRICAN AMERICAN): >60 ML/MIN/1.73 M^2
EST. GFR  (NON AFRICAN AMERICAN): 52.1 ML/MIN/1.73 M^2
GLUCOSE SERPL-MCNC: 116 MG/DL
HDLC SERPL-MCNC: 37 MG/DL
HDLC SERPL: 19.7 %
LDLC SERPL CALC-MCNC: 126.2 MG/DL
NONHDLC SERPL-MCNC: 151 MG/DL
POTASSIUM SERPL-SCNC: 4.4 MMOL/L
PROT SERPL-MCNC: 7.9 G/DL
SODIUM SERPL-SCNC: 142 MMOL/L
TRIGL SERPL-MCNC: 124 MG/DL

## 2017-12-06 PROCEDURE — 80061 LIPID PANEL: CPT

## 2017-12-06 PROCEDURE — 80053 COMPREHEN METABOLIC PANEL: CPT

## 2017-12-06 PROCEDURE — 36415 COLL VENOUS BLD VENIPUNCTURE: CPT | Mod: PO

## 2017-12-06 PROCEDURE — 82550 ASSAY OF CK (CPK): CPT

## 2017-12-11 ENCOUNTER — TELEPHONE (OUTPATIENT)
Dept: CARDIOLOGY | Facility: CLINIC | Age: 67
End: 2017-12-11

## 2017-12-11 ENCOUNTER — OFFICE VISIT (OUTPATIENT)
Dept: CARDIOLOGY | Facility: CLINIC | Age: 67
End: 2017-12-11
Payer: MEDICARE

## 2017-12-11 VITALS
BODY MASS INDEX: 31.2 KG/M2 | HEART RATE: 62 BPM | HEIGHT: 60 IN | SYSTOLIC BLOOD PRESSURE: 164 MMHG | WEIGHT: 158.94 LBS | DIASTOLIC BLOOD PRESSURE: 91 MMHG

## 2017-12-11 DIAGNOSIS — E78.5 DYSLIPIDEMIA: Chronic | ICD-10-CM

## 2017-12-11 DIAGNOSIS — I65.23 BILATERAL CAROTID ARTERY STENOSIS: Chronic | ICD-10-CM

## 2017-12-11 DIAGNOSIS — I10 ESSENTIAL HYPERTENSION: Chronic | ICD-10-CM

## 2017-12-11 DIAGNOSIS — E78.2 MIXED HYPERLIPIDEMIA: ICD-10-CM

## 2017-12-11 DIAGNOSIS — I72.6 VERTEBRAL ARTERY ANEURYSM: Primary | ICD-10-CM

## 2017-12-11 PROCEDURE — 99213 OFFICE O/P EST LOW 20 MIN: CPT | Mod: PBBFAC,PO | Performed by: INTERNAL MEDICINE

## 2017-12-11 PROCEDURE — 99214 OFFICE O/P EST MOD 30 MIN: CPT | Mod: S$PBB,,, | Performed by: INTERNAL MEDICINE

## 2017-12-11 PROCEDURE — 99999 PR PBB SHADOW E&M-EST. PATIENT-LVL III: CPT | Mod: PBBFAC,,, | Performed by: INTERNAL MEDICINE

## 2017-12-11 RX ORDER — CITALOPRAM 20 MG/1
TABLET, FILM COATED ORAL
Refills: 2 | COMMUNITY
Start: 2017-12-08 | End: 2020-05-27

## 2017-12-11 NOTE — TELEPHONE ENCOUNTER
Please refer patient to neurology dx vertrebral artery aneurysm. Referral placed per Dr. Ferrara please call and schedule patient from notes 12/11/17

## 2017-12-11 NOTE — PROGRESS NOTES
Subjective:    Patient ID:  Cindy Escobedo is a 67 y.o. female who presents for follow-up of Follow-up (follow up ); Hyperlipidemia; Hypertension; and Results      HPI  Ms. Escobedo here for follow up of palpitations, DLP, vertebral artery aneurysm. Patients states is doing well no chest pain, SOB or change in exertional tolerence. Patient is exercising regularly with out symptoms.    Review of Systems   Constitution: Negative for malaise/fatigue.   Eyes: Negative for blurred vision.   Cardiovascular: Negative for chest pain, claudication, cyanosis, dyspnea on exertion, irregular heartbeat, leg swelling, near-syncope, orthopnea, palpitations, paroxysmal nocturnal dyspnea and syncope.   Respiratory: Negative for cough and shortness of breath.    Hematologic/Lymphatic: Does not bruise/bleed easily.   Musculoskeletal: Negative for back pain, falls, joint pain, muscle cramps, muscle weakness and myalgias.   Gastrointestinal: Negative for abdominal pain, change in bowel habit, nausea and vomiting.   Genitourinary: Negative for urgency.   Neurological: Negative for dizziness, focal weakness and light-headedness.        Objective:    Physical Exam   Constitutional: She is oriented to person, place, and time. She appears well-developed and well-nourished.   HENT:   Head: Normocephalic.   Eyes: Conjunctivae are normal.   Neck: Normal range of motion. Neck supple. No JVD present.   Cardiovascular: Normal rate, regular rhythm, normal heart sounds and intact distal pulses.    Pulses:       Carotid pulses are 2+ on the right side, and 2+ on the left side.       Radial pulses are 2+ on the right side, and 2+ on the left side.        Dorsalis pedis pulses are 2+ on the right side, and 2+ on the left side.        Posterior tibial pulses are 2+ on the right side, and 2+ on the left side.   Pulmonary/Chest: Effort normal and breath sounds normal.   Abdominal: Soft. Bowel sounds are normal.   Musculoskeletal: She exhibits no edema or  tenderness.   Neurological: She is alert and oriented to person, place, and time. Gait normal.   Skin: Skin is warm, dry and intact. No cyanosis. Nails show no clubbing.   Psychiatric: She has a normal mood and affect. Her speech is normal and behavior is normal. Thought content normal.             ..    Chemistry        Component Value Date/Time     12/06/2017 1030    K 4.4 12/06/2017 1030     12/06/2017 1030    CO2 25 12/06/2017 1030    BUN 23 12/06/2017 1030    CREATININE 1.1 12/06/2017 1030     (H) 12/06/2017 1030        Component Value Date/Time    CALCIUM 10.1 12/06/2017 1030    ALKPHOS 65 12/06/2017 1030    AST 45 (H) 12/06/2017 1030    ALT 71 (H) 12/06/2017 1030    BILITOT 1.1 (H) 12/06/2017 1030    ESTGFRAFRICA >60.0 12/06/2017 1030    EGFRNONAA 52.1 (A) 12/06/2017 1030            ..  Lab Results   Component Value Date    CHOL 188 12/06/2017    CHOL 176 03/01/2016    CHOL 217 (H) 01/06/2015     Lab Results   Component Value Date    HDL 37 (L) 12/06/2017    HDL 36 (L) 03/01/2016    HDL 39 (L) 01/06/2015     Lab Results   Component Value Date    LDLCALC 126.2 12/06/2017    LDLCALC 113.4 03/01/2016    LDLCALC 147.4 01/06/2015     Lab Results   Component Value Date    TRIG 124 12/06/2017    TRIG 133 03/01/2016    TRIG 153 (H) 01/06/2015     Lab Results   Component Value Date    CHOLHDL 19.7 (L) 12/06/2017    CHOLHDL 20.5 03/01/2016    CHOLHDL 18.0 (L) 01/06/2015     ..  Lab Results   Component Value Date    WBC 5.16 11/28/2016    HGB 13.7 11/28/2016    HCT 38.4 11/28/2016    MCV 91 11/28/2016     11/28/2016       Test(s) Reviewed  I have reviewed the following in detail:  [] Stress test   [] Angiography   [] Echocardiogram   [x] Labs   [] Other:       Assessment:         ICD-10-CM ICD-9-CM   1. Vertebral artery aneurysm I72.6 442.81   2. Dyslipidemia E78.5 272.4   3. Essential hypertension I10 401.9   4. Bilateral carotid artery stenosis I65.23 433.10     433.30   5. Mixed  hyperlipidemia E78.2 272.2     Problem List Items Addressed This Visit     Carotid artery stenosis Mild (Chronic)    Dyslipidemia (Chronic)    Essential hypertension (Chronic)    Hyperlipidemia    Vertebral artery aneurysm - Primary    Overview     7/2012 angio  4.5 millimeter diameter fusiform left V4 segment aneurysm  Minimal intracranial atherosclerosis                Plan:           Return to clinic 1 year   Low level/low impact aerobic exercise 5x's/wk. Heart healthy diet and risk factor modification.    See labs and med orders.  Refer to neuro to follow va aneursym/carotid dsx.  tolerating livalo/fibrate.

## 2017-12-16 ENCOUNTER — TELEPHONE (OUTPATIENT)
Dept: INTERNAL MEDICINE | Facility: CLINIC | Age: 67
End: 2017-12-16

## 2017-12-16 DIAGNOSIS — K21.00 GASTROESOPHAGEAL REFLUX DISEASE WITH ESOPHAGITIS: ICD-10-CM

## 2017-12-18 RX ORDER — PANTOPRAZOLE SODIUM 40 MG/1
TABLET, DELAYED RELEASE ORAL
Qty: 30 TABLET | Refills: 0 | Status: SHIPPED | OUTPATIENT
Start: 2017-12-18 | End: 2018-01-23 | Stop reason: SDUPTHER

## 2017-12-20 NOTE — TELEPHONE ENCOUNTER
Will probably reorder liver enzymes next month at [facility], as they were minimally elevated.

## 2017-12-20 NOTE — TELEPHONE ENCOUNTER
----- Message from Suzette Cooper sent at 12/20/2017 11:32 AM CST -----  Contact:  - Santiago Escobedo  States that an appointment was made for the patient's annual and would like to know if the patient needs to do labs.  Labs were done on 12/06/2017.  If so, please call him back at [phone].  Thank you

## 2018-01-22 DIAGNOSIS — K21.00 GASTROESOPHAGEAL REFLUX DISEASE WITH ESOPHAGITIS: ICD-10-CM

## 2018-01-23 ENCOUNTER — HOSPITAL ENCOUNTER (OUTPATIENT)
Dept: RADIOLOGY | Facility: HOSPITAL | Age: 68
Discharge: HOME | End: 2018-01-23
Attending: INTERNAL MEDICINE
Payer: MEDICARE

## 2018-01-23 ENCOUNTER — OFFICE VISIT (OUTPATIENT)
Dept: INTERNAL MEDICINE | Facility: CLINIC | Age: 68
End: 2018-01-23
Payer: MEDICARE

## 2018-01-23 VITALS
SYSTOLIC BLOOD PRESSURE: 150 MMHG | BODY MASS INDEX: 30.47 KG/M2 | HEIGHT: 60 IN | DIASTOLIC BLOOD PRESSURE: 88 MMHG | WEIGHT: 155.19 LBS | RESPIRATION RATE: 20 BRPM | HEART RATE: 77 BPM | OXYGEN SATURATION: 95 %

## 2018-01-23 DIAGNOSIS — K21.00 GASTROESOPHAGEAL REFLUX DISEASE WITH ESOPHAGITIS: ICD-10-CM

## 2018-01-23 DIAGNOSIS — R05.3 CHRONIC COUGH: Primary | ICD-10-CM

## 2018-01-23 DIAGNOSIS — Z12.39 ENCOUNTER FOR SPECIAL SCREENING EXAMINATION FOR NEOPLASM OF BREAST: ICD-10-CM

## 2018-01-23 DIAGNOSIS — R79.89 ELEVATED LFTS: ICD-10-CM

## 2018-01-23 DIAGNOSIS — Z00.00 HEALTH CARE MAINTENANCE: ICD-10-CM

## 2018-01-23 DIAGNOSIS — E78.2 MIXED HYPERLIPIDEMIA: ICD-10-CM

## 2018-01-23 DIAGNOSIS — I72.6 VERTEBRAL ARTERY ANEURYSM: ICD-10-CM

## 2018-01-23 PROCEDURE — 77067 SCR MAMMO BI INCL CAD: CPT | Mod: 26,,, | Performed by: RADIOLOGY

## 2018-01-23 PROCEDURE — 77067 SCR MAMMO BI INCL CAD: CPT | Mod: TC,PO

## 2018-01-23 PROCEDURE — 99214 OFFICE O/P EST MOD 30 MIN: CPT | Mod: PBBFAC,PO | Performed by: INTERNAL MEDICINE

## 2018-01-23 PROCEDURE — 77063 BREAST TOMOSYNTHESIS BI: CPT | Mod: 26,,, | Performed by: RADIOLOGY

## 2018-01-23 PROCEDURE — G0009 ADMIN PNEUMOCOCCAL VACCINE: HCPCS | Mod: PBBFAC,PO

## 2018-01-23 PROCEDURE — 99214 OFFICE O/P EST MOD 30 MIN: CPT | Mod: S$PBB,,, | Performed by: INTERNAL MEDICINE

## 2018-01-23 PROCEDURE — 99999 PR PBB SHADOW E&M-EST. PATIENT-LVL IV: CPT | Mod: PBBFAC,,, | Performed by: INTERNAL MEDICINE

## 2018-01-23 RX ORDER — PANTOPRAZOLE SODIUM 40 MG/1
40 TABLET, DELAYED RELEASE ORAL DAILY
Qty: 90 TABLET | Refills: 1 | Status: SHIPPED | OUTPATIENT
Start: 2018-01-23 | End: 2019-04-18 | Stop reason: SDUPTHER

## 2018-01-23 RX ORDER — PANTOPRAZOLE SODIUM 40 MG/1
TABLET, DELAYED RELEASE ORAL
Qty: 30 TABLET | Refills: 0 | Status: CANCELLED | OUTPATIENT
Start: 2018-01-23

## 2018-01-23 NOTE — PROGRESS NOTES
HISTORY OF PRESENT ILLNESS:  Pt. is a 67 y.o. female presents for monitoring of her elevated LFTs, anemia, memory loss.  She has seen Dr. Nassar for her memory loss, she is having some issues.  She had done iron infusions with Dr. Weiss, last ferritin elevated, and she had been d/c'd from that clinic.  Her last LFTs had decreased slightly from previous.  She is also on ambien 5 mg po qhs prn for insominia and celexa 20 mg po q day.  Is on livalo and fibricor for her mixed hyperlipidemia.  She has been found to have a fatty liver on CT.  She has been having palpitations, sees Dr. Ferrara, who has recently referred her to neuro to follow her vertebral artery aneurysm.  She is having her chronic cough, though it is slightly better.  HTN is  Not in control.  She just had her mammogram today.    Health Maintenance Topics with due status: Not Due       Topic Last Completion Date    Colonoscopy 03/09/2015    DEXA SCAN 01/29/2016    TETANUS VACCINE 07/06/2016    Mammogram 01/11/2017    Lipid Panel 12/06/2017     Health Maintenance Due   Topic Date Due    Pneumococcal (65+) (2 of 2 - PPSV23) 01/04/2018       Lab Results   Component Value Date    WBC 5.16 11/28/2016    HGB 13.7 11/28/2016    HCT 38.4 11/28/2016     11/28/2016    CHOL 188 12/06/2017    TRIG 124 12/06/2017    HDL 37 (L) 12/06/2017    LDLCALC 126.2 12/06/2017    ALT 71 (H) 12/06/2017    AST 45 (H) 12/06/2017     12/06/2017    K 4.4 12/06/2017     12/06/2017    CREATININE 1.1 12/06/2017    BUN 23 12/06/2017    CO2 25 12/06/2017    ALBUMIN 4.2 12/06/2017    TSH 2.795 03/01/2016    INR 1.1 07/16/2012       Past Medical History:   Diagnosis Date    Allergy     Cystic disease of breast     Depression     Esophageal web     HEARING LOSS     HTN (hypertension)     Hyperlipidemia     Osteoarthritis     elevated CRP    Primary progressive aphasia     TIA (transient ischemic attack)        Past Surgical History:   Procedure Laterality Date     ADENOIDECTOMY      APPENDECTOMY      arthroscopy of knee      left    BLADDER SUSPENSION  1993    BREAST BIOPSY Left     neg    CARPAL TUNNEL RELEASE      right    cervcial fusion      anterior C4-5/C6-7    CERVICAL FUSION      seen Dr. Raymond patricia removed-RUL      CHOLECYSTECTOMY      COLONOSCOPY  3/2013    DILATION AND CURETTAGE OF UTERUS      esophageal hernia repair      ESOPHAGUS SURGERY      HYSTERECTOMY      due to excessive bleeding    JOINT REPLACEMENT      partial left knee    SINUS SURGERY      TONSILLECTOMY      TONSILLECTOMY, ADENOIDECTOMY, BILATERAL MYRINGOTOMY AND TUBES      UVULOPALATOPHARYNGOPLASTY AND SEPTOPLASTY      VAGINAL DELIVERY      times 2       Social History     Social History    Marital status:      Spouse name: N/A    Number of children: 2    Years of education: N/A     Occupational History     [employer] [employer]     Social History Main Topics    Smoking status: Former Smoker     Quit date: 7/16/1974    Smokeless tobacco: Never Used      Comment: quit in high school    Alcohol use No    Drug use: No    Sexual activity: Yes     Partners: Male     Birth control/ protection: Surgical     Other Topics Concern    None     Social History Narrative    None       ROS:  GENERAL: No fever, chills, fatigability or weight loss.  SKIN: No rashes, itching or changes in color or texture of skin.  HEAD: No headaches or recent head trauma.  EARS: Denies ear pain, discharge or vertigo.  NOSE: No loss of smell, no epistaxis or postnasal drip.  MOUTH & THROAT: No hoarseness or change in voice. No excessive gum bleeding.  NODES: Denies swollen glands.  CHEST: Denies WEST, cyanosis, wheezing, positive cough and no sputum production.  CARDIOVASCULAR: Denies chest pain, rare twinge, no PND, orthopnea or reduced exercise tolerance.  ABDOMEN: Appetite fine. No weight loss. Denies constipation, diarrhea, abdominal pain, hematemesis or blood in stool.  URINARY: No flank pain,  dysuria or hematuria.  PERIPHERAL VASCULAR: No claudication or cyanosis. No edema.  MUSCULOSKELETAL: No joint stiffness or swelling. Denies back pain.  NEUROLOGIC: Denies numbness    PE:   Vitals:   Vitals:    01/23/18 1454   BP: (!) 150/88   Pulse: 77   Resp: 20     GENERAL: no acute distress, A&Ox3, comfortable.  Female with BMI of 30HEENT: tympanic membranes clear, nasal mucosa pink, no pharyngeal erythema or exudate  NECK: supple, no cervical lymphadenopathy, no thyromegaly; no supraclavicular nodes;   CHEST:  clear to auscultation bilaterally, no crackles or wheeze; no increased work of breathing;  CARDIOVASCULAR: regular rate and rhythm, no rubs, murmurs or gallops.  ABDOMEN: normal bowel sounds, soft non-tender, non-distended; no palpable organomegaly;   EXT: no clubbing, cyanosis or edema.     ASSESSMENT/PLAN:    Chronic cough  -     Ambulatory referral to Pulmonology  -     Ambulatory referral to Pulmonology    Gastroesophageal reflux disease with esophagitis  -     pantoprazole (PROTONIX) 40 MG tablet; Take 1 tablet (40 mg total) by mouth once daily.  Dispense: 90 tablet; Refill: 1    Elevated LFTs/fatty liver:  livalo and fibricor for her mixed hyperlipidemia.  She has been found to have a fatty liver on CT.  -     US Abdomen Complete; Future; Expected date: 01/23/2018    Mixed hyperlipidemia: livalo and fibricor for her mixed hyperlipidemia; will continue;    Vertebral artery aneurysm: has been referred by cards to neuro;    Health care maintenance  -     (In Office Administered) Pneumococcal Polysaccharide Vaccine (23 Valent) (SQ/IM)        Medication List with Changes/Refills   Current Medications    CITALOPRAM (CELEXA) 20 MG TABLET    TK 1 T PO QD    FENOFIBRIC ACID (FIBRICOR) 45 MG CPDR    Take 1 capsule (45 mg total) by mouth once daily.    METOPROLOL SUCCINATE (TOPROL-XL) 50 MG 24 HR TABLET    TAKE 1 TABLET BY MOUTH EVERY MORNING    MULTIVITAMIN (THERAGRAN) PER TABLET    Every day    PITAVASTATIN  (LIVALO) 2 MG TAB TABLET    Take 1 tablet (2 mg total) by mouth every evening.    RANITIDINE (ZANTAC) 300 MG TABLET    TAKE 1 TABLET(300 MG) BY MOUTH EVERY EVENING    VALSARTAN-HYDROCHLOROTHIAZIDE (DIOVAN-HCT) 320-12.5 MG PER TABLET    Take 1 tablet by mouth every morning. Needs appt for further refills. Appt due in March 2017    ZOLPIDEM (AMBIEN) 5 MG TAB    nightly as needed.    Changed and/or Refilled Medications    Modified Medication Previous Medication    PANTOPRAZOLE (PROTONIX) 40 MG TABLET pantoprazole (PROTONIX) 40 MG tablet       Take 1 tablet (40 mg total) by mouth once daily.    TAKE 1 TABLET BY MOUTH ONCE DAILY.   Discontinued Medications    FLUZONE HIGH-DOSE 2017-18, PF, 180 MCG/0.5 ML VACCINE    ADM 0.5ML IM UTD     Call if condition changes or worsens.

## 2018-01-26 ENCOUNTER — HOSPITAL ENCOUNTER (OUTPATIENT)
Dept: RADIOLOGY | Facility: HOSPITAL | Age: 68
Discharge: HOME | End: 2018-01-26
Attending: INTERNAL MEDICINE
Payer: MEDICARE

## 2018-01-26 DIAGNOSIS — R05.9 COUGH: ICD-10-CM

## 2018-01-26 DIAGNOSIS — R79.89 ELEVATED LFTS: ICD-10-CM

## 2018-01-26 PROCEDURE — 71046 X-RAY EXAM CHEST 2 VIEWS: CPT | Mod: TC,FY,PO

## 2018-01-26 PROCEDURE — 76700 US EXAM ABDOM COMPLETE: CPT | Mod: 26,,, | Performed by: RADIOLOGY

## 2018-01-26 PROCEDURE — 71046 X-RAY EXAM CHEST 2 VIEWS: CPT | Mod: 26,,, | Performed by: RADIOLOGY

## 2018-01-26 PROCEDURE — 76700 US EXAM ABDOM COMPLETE: CPT | Mod: TC,PO

## 2018-01-27 PROBLEM — E78.2 MIXED HYPERLIPIDEMIA: Status: ACTIVE | Noted: 2017-01-04

## 2018-01-27 PROBLEM — R05.3 CHRONIC COUGH: Status: ACTIVE | Noted: 2018-01-27

## 2018-01-27 PROBLEM — R79.89 ELEVATED LFTS: Status: ACTIVE | Noted: 2018-01-27

## 2018-03-07 ENCOUNTER — HOSPITAL ENCOUNTER (OUTPATIENT)
Dept: RADIOLOGY | Facility: HOSPITAL | Age: 68
Discharge: HOME | End: 2018-03-07
Attending: INTERNAL MEDICINE
Payer: MEDICARE

## 2018-03-07 ENCOUNTER — OFFICE VISIT (OUTPATIENT)
Dept: NEUROLOGY | Facility: CLINIC | Age: 68
End: 2018-03-07
Payer: MEDICARE

## 2018-03-07 VITALS
WEIGHT: 152.5 LBS | HEART RATE: 73 BPM | SYSTOLIC BLOOD PRESSURE: 153 MMHG | DIASTOLIC BLOOD PRESSURE: 79 MMHG | RESPIRATION RATE: 16 BRPM | HEIGHT: 60 IN | BODY MASS INDEX: 29.94 KG/M2

## 2018-03-07 DIAGNOSIS — F02.80 PRIMARY PROGRESSIVE APHASIA: Primary | ICD-10-CM

## 2018-03-07 DIAGNOSIS — R05.9 COUGH: ICD-10-CM

## 2018-03-07 DIAGNOSIS — Z98.1 HISTORY OF FUSION OF CERVICAL SPINE: ICD-10-CM

## 2018-03-07 DIAGNOSIS — I65.21 ASYMPTOMATIC STENOSIS OF RIGHT CAROTID ARTERY: Chronic | ICD-10-CM

## 2018-03-07 DIAGNOSIS — G31.01 PRIMARY PROGRESSIVE APHASIA: Primary | ICD-10-CM

## 2018-03-07 DIAGNOSIS — I72.6 VERTEBRAL ARTERY ANEURYSM: ICD-10-CM

## 2018-03-07 PROCEDURE — 71046 X-RAY EXAM CHEST 2 VIEWS: CPT | Mod: 26,,, | Performed by: RADIOLOGY

## 2018-03-07 PROCEDURE — 71046 X-RAY EXAM CHEST 2 VIEWS: CPT | Mod: TC,FY,PO

## 2018-03-07 PROCEDURE — 99205 OFFICE O/P NEW HI 60 MIN: CPT | Mod: S$PBB,,, | Performed by: PSYCHIATRY & NEUROLOGY

## 2018-03-07 PROCEDURE — 99999 PR PBB SHADOW E&M-EST. PATIENT-LVL IV: CPT | Mod: PBBFAC,,, | Performed by: PSYCHIATRY & NEUROLOGY

## 2018-03-07 PROCEDURE — 99214 OFFICE O/P EST MOD 30 MIN: CPT | Mod: PBBFAC,25,PN | Performed by: PSYCHIATRY & NEUROLOGY

## 2018-03-07 NOTE — PROGRESS NOTES
"Subjective:      4/1/2018       Patient ID: Cindy Escobedo is a right handed 68 y.o.  female who presents for vertebral artery aneurysm and carotid artery disease    History of Present Illness    Reviewed records in Epic and previous imaging studies.  July 16, 2012 she underwent 4 vessel cerebral arteriography to further evaluate vertebral artery aneurysm noted on CT angiogram of the head performed in June of that year.  CT angiogram documents a 6 mm fusiform aneurysm arising from the V4 segment of the left vertebral artery; on angiography there is documentation of a left-sided vertebral artery aneurysm measuring up to 4.5 mm, with fusiform enlargement at the V4 segment along the anterior side of the vessel opposite of the left PICA origin.    Additional studies include noncontrast MRI of the brain Nov 5, 2013, vertebral aneurysm not well evaluated on that exam thought to measures 7 mm in size; CT angiogram of the neck performed March 11, 2016 describing "stable 6 mm fusiform aneurysm of the V4 segment of the left vertebral artery, unchanged".  Also documents 60-70% luminal stenosis of the right common carotid artery.    She is here with her daughter; she informed me that in addition to the aneurysm she has also been followed by Dr. Rossi for a dx of primary progressive aphasia; unfortunately I do not have any records regarding this issue.  Dx with PPA 4 years ago; unclear if she had detailed neuropsychological testing.  Needs help with medications, unable to do simple tasks at home such as making a cup of coffee; not driving.  Unable to write her name, difficult to have conversation, hard time getting words or her ideas out.      First started to notice forgetfulness years ago 4 1/2 years ago; was driving, [place] the [place] the [place].  Gradually progressive process.  Language is variable; some days seems fine, some days struggles.  Her daughter has not noticed any significant personality " "changes; she does struggle more to perform complex tasks e.g. Remembering how to bake cakes for the family or when she needs to perform functions requiring several steps. Does not recall specifically being on any medication for this.     Family is not aware she ever had a stroke; many years ago (maybe 20) suspects she may have had a TIA but does not recall the clinical presentation.      We reviewed her different studies.  She is on BP medication, pitavastatin.  She is not on aspirin; unable to say exactly why but says "aspirin and I don't get along".  Not really sure why. No issues or hx of bleeding. She does report hx of iron deficiency and has received iron infusions, but no low platelets or anemia documented in lab records.      Eats out a lot, may be a lot of fast food.          Review of patient's allergies indicates:   Allergen Reactions    Codeine Anaphylaxis    Ace inhibitors      Other reaction(s): cough    Cat hair extract      Other reaction(s): Sneezing  Other reaction(s): Rash    Epinephrine      Other reaction(s): Tachycardia    Hydrocodone-acetaminophen      Other reaction(s): Anaphylaxis    Lipitor [atorvastatin]      Myalgias      Oxycodone hcl-oxycodone-asa      Other reaction(s): Agitation    Rosuvastatin      Other reaction(s): Muscle pain    Sulfamethoxazole-trimethoprim      Other reaction(s): "Cracked lips"    Terramycin  [oxytetracycline]      Other reaction(s): Fainting     Current Outpatient Prescriptions   Medication Sig Dispense Refill    citalopram (CELEXA) 20 MG tablet TK 1 T PO QD  2    fenofibric acid (FIBRICOR) 45 mg CpDR Take 1 capsule (45 mg total) by mouth once daily. 90 capsule 4    metoprolol succinate (TOPROL-XL) 50 MG 24 hr tablet TAKE 1 TABLET BY MOUTH EVERY MORNING 90 tablet 6    multivitamin (THERAGRAN) per tablet Every day      pantoprazole (PROTONIX) 40 MG tablet Take 1 tablet (40 mg total) by mouth once daily. 90 tablet 1    ranitidine (ZANTAC) 300 MG " tablet TAKE 1 TABLET(300 MG) BY MOUTH EVERY EVENING 30 tablet 0    valsartan-hydrochlorothiazide (DIOVAN-HCT) 320-12.5 mg per tablet Take 1 tablet by mouth every morning. Needs appt for further refills. Appt due in March 2017 90 tablet 4    zolpidem (AMBIEN) 5 MG Tab nightly as needed.   5    LIVALO 2 mg Tab tablet TAKE 1 TABLET(2 MG) BY MOUTH EVERY EVENING 90 tablet 4     No current facility-administered medications for this visit.        Past Medical History  Past Medical History:   Diagnosis Date    Allergy     Cystic disease of breast     Depression     Esophageal web     HEARING LOSS     HTN (hypertension)     Hyperlipidemia     Osteoarthritis     elevated CRP    Primary progressive aphasia     TIA (transient ischemic attack)        Past Surgical History  Past Surgical History:   Procedure Laterality Date    ADENOIDECTOMY      APPENDECTOMY      arthroscopy of knee      left    BLADDER SUSPENSION  1993    BREAST BIOPSY Left     neg    CARPAL TUNNEL RELEASE      right    cervcial fusion      anterior C4-5/C6-7    CERVICAL FUSION      seen Dr. Raymond patricia removed-RUL      CHOLECYSTECTOMY      COLONOSCOPY  3/2013    DILATION AND CURETTAGE OF UTERUS      esophageal hernia repair      ESOPHAGUS SURGERY      HYSTERECTOMY      due to excessive bleeding    JOINT REPLACEMENT      partial left knee    SINUS SURGERY      TONSILLECTOMY      TONSILLECTOMY, ADENOIDECTOMY, BILATERAL MYRINGOTOMY AND TUBES      UVULOPALATOPHARYNGOPLASTY AND SEPTOPLASTY      VAGINAL DELIVERY      times 2       Family History  Family History   Problem Relation Age of Onset    COPD Mother     Stroke Father      cerebral hemorrhage    Breast cancer Maternal Grandmother     Cancer Maternal Grandmother     Glaucoma Sister     Uveitis Sister     Cataracts Sister     Uveitis Sister     Amblyopia Neg Hx     Blindness Neg Hx     Diabetes Neg Hx     Hypertension Neg Hx     Macular degeneration Neg Hx      Retinal detachment Neg Hx     Strabismus Neg Hx     Thyroid disease Neg Hx        Social History  Social History     Social History    Marital status:      Spouse name: N/A    Number of children: 2    Years of education: N/A     Occupational History     [employer]     Social History Main Topics    Smoking status: Former Smoker     Quit date: 7/16/1974    Smokeless tobacco: Never Used      Comment: quit in high school    Alcohol use No    Drug use: No    Sexual activity: Yes     Partners: Male     Birth control/ protection: Surgical     Other Topics Concern    Not on file     Social History Narrative    No narrative on file        Review of Systems  Review of Systems   Unable to perform ROS: Dementia       Objective:        Vitals:    03/07/18 1310   BP: (!) 153/79   Pulse: 73   Resp: 16     Body mass index is 29.78 kg/m².  Neurologic Exam     Mental Status   Level of consciousness: alert  Spontaneous speech is OK but  struggles with word finding as well as comprehension on direct questions.  Pen=pencil, names watch but not band.  Some paraphasic errors.  On naming 3/6; ... Barker, prompted and got feather.  Unable to follow 3 step commands.  R/L confusion.  Apraxia with activity using the right hand more so than the left; finger as tool or more profound apraxia (using scissors, screwdriver or hammer)     Cranial Nerves   Cranial nerves II through XII intact.     Motor Exam   Right arm pronator drift: present  Left arm pronator drift: absent    Strength   Strength 5/5 except as noted.     Sensory Exam   Light touch normal.     Gait, Coordination, and Reflexes     Gait  Gait: normal    Coordination   Romberg: negative  Tandem walking coordination: normal    Tremor   Resting tremor: absent  Action tremor: absent    Reflexes   Right brachioradialis: 1+  Left brachioradialis: 2+  Right biceps: 1+  Left biceps: 2+  Right triceps: 1+  Left triceps: 2+  Right patellar: 3+  Left patellar: 2+  Right  achilles: 1+  Left achilles: 1+  Right plantar: upgoing  Left plantar: normal      Physical Exam   Constitutional: She appears well-developed and well-nourished.   HENT:   Head: Normocephalic and atraumatic.   Cardiovascular: Normal rate and regular rhythm.    Pulmonary/Chest: Effort normal and breath sounds normal.   Neurological: She has a normal Romberg Test and a normal Tandem Gait Test. Gait normal.   Reflex Scores:       Tricep reflexes are 1+ on the right side and 2+ on the left side.       Bicep reflexes are 1+ on the right side and 2+ on the left side.       Brachioradialis reflexes are 1+ on the right side and 2+ on the left side.       Patellar reflexes are 3+ on the right side and 2+ on the left side.       Achilles reflexes are 1+ on the right side and 1+ on the left side.  Vitals reviewed.        Data Review:     Narrative     DATE OF EXAM: Jul 16 2012      ANG   0040  -  ANG CRTD/CEREB BILATERAL S&I:   \  [id number]     CLINICAL HISTORY:   \747.81 ANEURISM     PROCEDURE COMMENT:   \     ICD 9 CODE(S):   (\)     CPT 4 CODE(S)/MODIFIER(S):   (\)     Procedure: Four-vessel cerebral angiogram was performed.  Selective   angiographic injection runs were done of the right internal carotid,   right common carotid, right proximal subclavian, right vertebral, left   common carotid, and the left vertebral arteries.     Indication: 62-year-old woman with a vertebral artery aneurysm     Comparison: CTA of the head on 06/18/12     Findings:  Device: Vascular access was obtained from the right femoral artery using   a 6-[place] sheath.  A Cas catheter, a J wire, and a Glidewire were   used.  The right groin arteriotomy was closed with a Perclose device.     The right common carotid artery injection demonstrates atherosclerotic   bifurcation in the form of calcified plaque at the proximal bulb   resulting in less than 30% stenosis as per NASCET criteria.  The right   internal carotid artery injection shows flash  filling across the anterior   communicating artery.  Mild intracranial atherosclerotic changes are   present.  No other abnormality is seen in the right carotid territory.       Right vertebral injection the was performed after a initial angiogram of   the subclavian artery at the vertebral origin and showed that the right   vertebral origin is intact.  The right vertebral artery is normal.  The   basilar artery fills mostly from the left.     The left common carotid bifurcation shows minimal atherosclerotic disease   at the bulb.  Intracranial vessel on the left common carotid artery   injection show a smaller sized left A1 relative to the right.  The left   vertebral artery was unremarkable on hand injections.       Left vertebral artery selective injections demonstrate that there is   fusiform enlargement of the left vertebral artery at the V4 segment along   the anterior side of the vessel opposite of the left PICA origin.  The   left PICA is normal without stenosis, thrombus or significant   irregularity.  The distal left vertebral artery caliber measures 3 mm   proximal to the aneurysm, 2.5 mm distal to the aneurysm, and 4.5 mm in   the aneurysm itself when correlating with the previous CTA.  The fusiform   aneurysmal segment is a short segment, less than one CM in length.     Overall, the intracranial vasculature shows minimal atherosclerosis.  No   saccular aneurysms are present.  There is no evidence for branch vessel   occlusion or high-grade stenosis.        Impression:     4.5 millimeter diameter fusiform left V4 segment aneurysm     Minimal intracranial atherosclerosis        Operators: Dr. Sosa staff physician, Dr. Dave fellow  Contrast dose: 90 cc of Visipaque  Radiation dose: 9815 cGycm2  Sedation: Moderate sedation for 55 minutes by a certified  independent   radiology nurse  CPT codes: 95652, 91578, 97646, 95530, 87035, 74795, 03051, 01758  ______________________________________       Electronically signed by resident: Glenis Diez MD  Date:     07/16/12  Time:    11:11      ______________________________________      Electronically signed by: RITU SAAVEDRA MD  Date:     07/17/12  Time:    10:40            : THOMAS  Transcribe Date/Time: Jul 17 2012 10:41A  Dictated by : GLENIS DIEZ MD  Read On: Jul 16 2012 11:11A  \  Images were reviewed, findings were verified and document was   electronically  SIGNED BY: RITU SAAVEDRA MD On: Jul 17 2012 10:41A   GLENIS DIEZ MD       CTA head/neck 4/8/16:    Impression         1.  Stable 6 mm fusiform aneurysm of the V4 segment of the left vertebral artery, unchanged.  No new aneurysms.    2.  Prominent calcified plaque deposition within the right common carotid artery resulting in a 60-70 % luminal stenosis of the right common carotid artery.  Calcified plaque deposition makes evaluation of the proximal most right internal carotid artery challenging; however, calcified plaque deposition is favored to lie within the right carotid bulb rather than within the proximal right internal carotid artery..  No definite hemodynamically significant luminal stenosis observed within the right internal carotid artery.    3.  Postoperative changes of the cervical spine and degenerative change of the cervical spine as detailed above.    4.  Prominent adenoid tonsils which result in narrowing of the nasopharyngeal airway.  ______________________________________     Electronically signed by: Abi Pearce MD  Date: 04/08/16  Time: 16:28          Assessment:       1. Primary progressive aphasia    2. History of fusion of cervical spine    3. Vertebral artery aneurysm    4. Asymptomatic stenosis of right carotid artery        Plan:         Problem List Items Addressed This Visit        Neuro    Primary progressive aphasia - Primary    Relevant Orders    EEG,w/awake & asleep record    MRI Brain W WO Contrast    Ambulatory consult to Neuropsychology     Vertebral artery aneurysm    Overview     7/2012 angio  4.5 millimeter diameter fusiform left V4 segment aneurysm  Minimal intracranial atherosclerosis         Relevant Orders    MRI Brain W WO Contrast    IR Angiogram Cerebral Intracranial ea Add Vessel    History of fusion of cervical spine    Relevant Orders    Creatinine, serum       Cardiac/Vascular    Asymptomatic stenosis of right carotid artery (Chronic)    Relevant Orders    MRI Brain W WO Contrast    IR Angiogram Cerebral Intracranial ea Add Vessel      Over 60 minutes time spent with patient, > 50% in discussion and counseling with patient regarding diagnosis, prognosis and treatment strategies      Follow-up in about 2 months (around 5/7/2018).        Patient information shared at the time of visit:      Thank you very much for the opportunity to assist in this patient's care.  If you have any questions or concerns, please do not hesitate to contact me at any time.     Sincerely,  Wesley Ernst, DO

## 2018-03-19 DIAGNOSIS — E78.5 DYSLIPIDEMIA: Chronic | ICD-10-CM

## 2018-03-19 DIAGNOSIS — I72.6 VERTEBRAL ARTERY ANEURYSM: ICD-10-CM

## 2018-03-19 DIAGNOSIS — I65.23 BILATERAL CAROTID ARTERY STENOSIS: Chronic | ICD-10-CM

## 2018-03-19 DIAGNOSIS — R00.2 PALPITATIONS: Chronic | ICD-10-CM

## 2018-03-19 RX ORDER — PITAVASTATIN CALCIUM 2.09 MG/1
TABLET, FILM COATED ORAL
Qty: 90 TABLET | Refills: 4 | Status: SHIPPED | OUTPATIENT
Start: 2018-03-19 | End: 2019-01-21

## 2018-04-01 NOTE — ASSESSMENT & PLAN NOTE
Follow up angiogram.  Intolerant to antiplatelet medications; multiple allergies including statin.  Opportunities for lifestyle modification although will be difficult due to impulsivity from FTD/PPA

## 2018-04-01 NOTE — ASSESSMENT & PLAN NOTE
Variant form of frontotemporal dementia; limited data suggesting benefit from acetylcholinesterase inhibitors, memantine not recommended.  Potential role for sertotonergic medications or antipsychotic medications if behaviors warrant; stimulants as well if warranted.  For now continue celexa, follow up detailed neuropsychological evaluation results

## 2018-04-09 DIAGNOSIS — I72.6 VERTEBRAL ARTERY ANEURYSM: ICD-10-CM

## 2018-04-09 DIAGNOSIS — R00.2 PALPITATIONS: Chronic | ICD-10-CM

## 2018-04-09 DIAGNOSIS — I65.23 BILATERAL CAROTID ARTERY STENOSIS: Chronic | ICD-10-CM

## 2018-04-09 DIAGNOSIS — E78.5 DYSLIPIDEMIA: Chronic | ICD-10-CM

## 2018-04-10 RX ORDER — FENOFIBRIC ACID 45 MG/1
CAPSULE, DELAYED RELEASE ORAL
Qty: 90 CAPSULE | Refills: 4 | Status: SHIPPED | OUTPATIENT
Start: 2018-04-10 | End: 2019-08-02 | Stop reason: SDUPTHER

## 2018-04-23 ENCOUNTER — OFFICE VISIT (OUTPATIENT)
Dept: FAMILY MEDICINE | Facility: CLINIC | Age: 68
End: 2018-04-23
Payer: MEDICARE

## 2018-04-23 ENCOUNTER — PATIENT MESSAGE (OUTPATIENT)
Dept: FAMILY MEDICINE | Facility: CLINIC | Age: 68
End: 2018-04-23

## 2018-04-23 ENCOUNTER — HOSPITAL ENCOUNTER (OUTPATIENT)
Dept: RADIOLOGY | Facility: HOSPITAL | Age: 68
Discharge: HOME | End: 2018-04-23
Attending: NURSE PRACTITIONER
Payer: MEDICARE

## 2018-04-23 VITALS
TEMPERATURE: 99 F | HEART RATE: 76 BPM | SYSTOLIC BLOOD PRESSURE: 120 MMHG | OXYGEN SATURATION: 96 % | DIASTOLIC BLOOD PRESSURE: 64 MMHG | HEIGHT: 60 IN | BODY MASS INDEX: 29.61 KG/M2 | WEIGHT: 150.81 LBS

## 2018-04-23 DIAGNOSIS — R10.32 LEFT LOWER QUADRANT PAIN: ICD-10-CM

## 2018-04-23 DIAGNOSIS — R10.32 LEFT LOWER QUADRANT PAIN: Primary | ICD-10-CM

## 2018-04-23 DIAGNOSIS — K59.00 CONSTIPATION, UNSPECIFIED CONSTIPATION TYPE: ICD-10-CM

## 2018-04-23 PROCEDURE — 74019 RADEX ABDOMEN 2 VIEWS: CPT | Mod: 26,,, | Performed by: RADIOLOGY

## 2018-04-23 PROCEDURE — 74019 RADEX ABDOMEN 2 VIEWS: CPT | Mod: TC,FY,PO

## 2018-04-23 PROCEDURE — 99214 OFFICE O/P EST MOD 30 MIN: CPT | Mod: S$GLB,,, | Performed by: NURSE PRACTITIONER

## 2018-04-23 NOTE — TELEPHONE ENCOUNTER
The xray of the abdomen did not show anything to account for her abdominal pain. She has no obstruction. She has some fatty liver noted, this is not new. No masses.  It could not rule out renal stones, but she is not having symptoms to make me think that is an issue.  I recommend doing some STOOL SOFTENERS over the next few days to clean out her system, not laxatives. Hydrate well and if symptoms not clearing up in a few days or if they get worse to let me know.

## 2018-04-23 NOTE — PROGRESS NOTES
Subjective:       Patient ID: Cindy Escobedo is a 68 y.o. female.    Chief Complaint: Nausea and Abdominal Pain    HPI new patient to me. Here with concerns regarding nausea, stomach cramping. Onset Friday. Had vomiting on Friday evening. Having some stomach cramping. No diarrhea. LLQ abdominal pain. No nausea. No more vomiting. Eating and drinking. She took some stool softeners and that helped a little. She states she has this off and on and once she has a good BM then it will get better and go away.  She denies fever. She denies any urinary changes. She only vomited once, states she thought that was due to a shrimp poboy that she ate. She rates discomfort at 6/10 on pain scale. Has had CT scans of the abdomen done in the past. Noted to have diverticulosis without diverticulitis. See ROS.    The following portion of the patients history was reviewed and updated as appropriate: allergies, current medications, past medical and surgical history. Past social history and problem list reviewed. Family PMH and Past social history reviewed. Tobacco, Illicit drug use reviewed.     Review of Systems  Constitutional: No fatigue or fever    Respiratory:   No SOB, Wheezing, cough  Cardiovascular:   No CP, Palpitations  Gastrointestinal:   No N/V/D at this time. Had nausea and vomited once on friday. LLQ abdominal cramping and tenderness, weight stable. Appetite good.   Genitourinary:   No dysuria, urgency or frequency. No change in bowels. No blood in stools.   Musculoskeletal:   No joint pain  No change in gait or coordination. .  Neurological:   No dizziness. No headaches  Hematological: No abnormal bruising or bleeding    Psychiatric/Behavioral Negative for suicidal ideas.  Denies feelings of depression. No thoughts of wanting to harm self or others.     Objective:     /64 (BP Location: Left arm, Patient Position: Sitting, BP Method: Medium (Manual))   Pulse 76   Temp 98.6 °F (37 °C) (Oral)   Ht 5' (1.524 m)   Wt  68.4 kg (150 lb 12.7 oz)   SpO2 96%   BMI 29.45 kg/m²      Physical Exam     Constitutional: oriented to person, place, and time. well-developed and well-nourished. no distress  Cardiovascular: Normal rate, regular rhythm and normal heart sounds.    Pulmonary/Chest: Effort normal and breath sounds normal. No respiratory distress. No wheezes.   Abdominal: Soft. Bowel sounds are normal. No distension. There is mild LLQ  tenderness. no rebound. Also mildly tender in the epigastric area.  Musculoskeletal: Normal range of motion. Gait and coordination normal  Assessment:       1. Left lower quadrant pain    2. Constipation, unspecified constipation type        Plan:         Cindy was seen today for nausea and abdominal pain.    Diagnoses and all orders for this visit:    Left lower quadrant pain: will get xray of the abdomen.   -     X-Ray Abdomen Flat And Erect; Future    Constipation, unspecified constipation type: continue stool softeners. Hydrate well.     Continue current medication  Take medications only as prescribed  Healthy diet, exercise  Adequate rest  Adequate hydration  Avoid allergens  Avoid excessive caffeine

## 2018-04-24 ENCOUNTER — TELEPHONE (OUTPATIENT)
Dept: FAMILY MEDICINE | Facility: CLINIC | Age: 68
End: 2018-04-24

## 2018-04-24 NOTE — TELEPHONE ENCOUNTER
Please call her and see how she is feeling. Is she still having nausea and LLQ abdominal pain? Is it better? Worse?  Thanks.

## 2018-04-24 NOTE — TELEPHONE ENCOUNTER
Bloating pain, had a good bowel movement this morning.   Pain is better.   Informed pt to call if symptoms worsen or do not get better.

## 2018-05-17 ENCOUNTER — OFFICE VISIT (OUTPATIENT)
Dept: FAMILY MEDICINE | Facility: CLINIC | Age: 68
End: 2018-05-17
Payer: MEDICARE

## 2018-05-17 VITALS
HEART RATE: 64 BPM | WEIGHT: 152 LBS | BODY MASS INDEX: 29.84 KG/M2 | DIASTOLIC BLOOD PRESSURE: 70 MMHG | SYSTOLIC BLOOD PRESSURE: 136 MMHG | HEIGHT: 60 IN | RESPIRATION RATE: 18 BRPM | TEMPERATURE: 98 F

## 2018-05-17 DIAGNOSIS — E78.5 HYPERLIPIDEMIA, UNSPECIFIED HYPERLIPIDEMIA TYPE: ICD-10-CM

## 2018-05-17 DIAGNOSIS — N18.30 CKD (CHRONIC KIDNEY DISEASE) STAGE 3, GFR 30-59 ML/MIN: ICD-10-CM

## 2018-05-17 DIAGNOSIS — R79.89 ELEVATED LFTS: ICD-10-CM

## 2018-05-17 DIAGNOSIS — I10 HYPERTENSION, ESSENTIAL: Primary | ICD-10-CM

## 2018-05-17 PROCEDURE — 99214 OFFICE O/P EST MOD 30 MIN: CPT | Mod: S$GLB,,, | Performed by: FAMILY MEDICINE

## 2018-05-17 RX ORDER — VALSARTAN AND HYDROCHLOROTHIAZIDE 320; 12.5 MG/1; MG/1
1 TABLET, FILM COATED ORAL DAILY
COMMUNITY
End: 2020-08-26

## 2018-05-22 ENCOUNTER — LAB VISIT (OUTPATIENT)
Dept: LAB | Facility: HOSPITAL | Age: 68
End: 2018-05-22
Attending: FAMILY MEDICINE
Payer: MEDICARE

## 2018-05-22 DIAGNOSIS — I10 HYPERTENSION, ESSENTIAL: ICD-10-CM

## 2018-05-22 DIAGNOSIS — R79.89 ELEVATED LFTS: ICD-10-CM

## 2018-05-22 DIAGNOSIS — N18.30 CKD (CHRONIC KIDNEY DISEASE) STAGE 3, GFR 30-59 ML/MIN: ICD-10-CM

## 2018-05-22 DIAGNOSIS — E78.5 HYPERLIPIDEMIA, UNSPECIFIED HYPERLIPIDEMIA TYPE: ICD-10-CM

## 2018-05-22 LAB
BILIRUB UR QL STRIP: NEGATIVE
CLARITY UR: CLEAR
COLOR UR: YELLOW
CREAT UR-MCNC: 96 MG/DL
GLUCOSE UR QL STRIP: NEGATIVE
HGB UR QL STRIP: ABNORMAL
KETONES UR QL STRIP: NEGATIVE
LEUKOCYTE ESTERASE UR QL STRIP: NEGATIVE
NITRITE UR QL STRIP: NEGATIVE
PH UR STRIP: 6 [PH] (ref 5–8)
PROT UR QL STRIP: NEGATIVE
PROT UR-MCNC: <7 MG/DL
PROT/CREAT RATIO, UR: NORMAL
SP GR UR STRIP: 1.02 (ref 1–1.03)
URN SPEC COLLECT METH UR: ABNORMAL

## 2018-05-22 PROCEDURE — 81003 URINALYSIS AUTO W/O SCOPE: CPT | Mod: PO

## 2018-05-22 PROCEDURE — 84156 ASSAY OF PROTEIN URINE: CPT

## 2018-05-23 ENCOUNTER — PATIENT MESSAGE (OUTPATIENT)
Dept: FAMILY MEDICINE | Facility: CLINIC | Age: 68
End: 2018-05-23

## 2018-05-24 NOTE — PROGRESS NOTES
Subjective:       Patient ID: Cindy Escobedo is a 68 y.o. female.    Chief Complaint: Establish Care (Physical)    HPI   The patient is a 68-year-old who is here today to establish care with me.  She is here today with her .  During our visit, she is a poor historian and her  reports that she has been having trouble with short-term memory, trouble with instructions, trouble with sequencing and trouble with her speech.  She has seen 2 neurologists.  Her memory score is usually around 16 but can go up and down at times.  She has also been diagnosed with primary progressive aphasia    Today we discussed the followin)  hypertension.  She is currently taking Toprol-XL 50 mg once a day and Diovan /12.5 mg once a day.  She does not check her blood pressure outside of the office  2)  depression.  She is currently taking Celexa.  She finds that this is working well for her.  She denies any significant depression currently  3)  hyperlipidemia.  She is currently taking Livalo and fenofibrate.  The Livalo is expensive and they are interested in trying to change to less expensive medicine.  She is due for labs and would be willing to get these done  4)  GERD.  She is currently taking Protonix in the morning and Zantac in the evening.  This combination is working well for her.  She did have an EGD in 2017 which showed esophagitis and Schatzki's ring which was dilated.  She denies any trouble swallowing currently  5)  elevated LFTs.  This was found is reviewed her chart.  She was when aware of this finding.  She has had elevated LFTs since 2016.  She did have an ultrasound in 2018 which did show fatty liver disease  6)  chronic kidney disease.  This was found in review of her chart.  She was unaware of this finding.  7)  FARA.  She does have a history of sleep apnea.  She refuses to consider CPAP      Review of Systems   Constitutional: Negative for appetite change, chills, diaphoresis,  fatigue, fever and unexpected weight change.   HENT: Negative for congestion, ear pain, postnasal drip, rhinorrhea, sinus pressure, sneezing, sore throat and trouble swallowing.    Eyes: Negative for pain, discharge and visual disturbance.   Respiratory: Negative for cough, chest tightness, shortness of breath and wheezing.    Cardiovascular: Negative for chest pain, palpitations and leg swelling.   Gastrointestinal: Negative for abdominal distention, abdominal pain, blood in stool, constipation, diarrhea, nausea and vomiting.   Skin: Negative for rash.       Objective:      Physical Exam   Constitutional: She is oriented to person, place, and time. She appears well-developed and well-nourished. No distress.   She is a poor historian   HENT:   Head: Normocephalic and atraumatic.   Right Ear: Hearing, tympanic membrane, external ear and ear canal normal.   Left Ear: Hearing, tympanic membrane, external ear and ear canal normal.   Nose: Nose normal.   Mouth/Throat: Oropharynx is clear and moist and mucous membranes are normal. No oral lesions. No oropharyngeal exudate, posterior oropharyngeal edema or posterior oropharyngeal erythema.   Eyes: Conjunctivae, EOM and lids are normal. Pupils are equal, round, and reactive to light. No scleral icterus.   Neck: Normal range of motion. Neck supple. Carotid bruit is not present. No thyroid mass and no thyromegaly present.   Cardiovascular: Normal rate, regular rhythm and normal heart sounds.   No extrasystoles are present. PMI is not displaced.  Exam reveals no gallop.    No murmur heard.  Pulmonary/Chest: Effort normal and breath sounds normal. No accessory muscle usage. No respiratory distress.   Clear to auscultation bilaterally.   Abdominal: Soft. Normal appearance and bowel sounds are normal. She exhibits no abdominal bruit. There is no hepatosplenomegaly. There is no tenderness. There is no rebound.   Lymphadenopathy:        Head (right side): No submental and no  submandibular adenopathy present.        Head (left side): No submental and no submandibular adenopathy present.        Right cervical: No superficial cervical, no deep cervical and no posterior cervical adenopathy present.       Left cervical: No superficial cervical, no deep cervical and no posterior cervical adenopathy present.        Right: No supraclavicular adenopathy present.        Left: No supraclavicular adenopathy present.   Neurological: She is alert and oriented to person, place, and time.   Skin: Skin is warm, dry and intact.   Psychiatric: She has a normal mood and affect. Cognition and memory are impaired.     Blood pressure 136/70, pulse 64, temperature 98.4 °F (36.9 °C), temperature source Oral, resp. rate 18, height 5' (1.524 m), weight 68.9 kg (152 lb).Body mass index is 29.69 kg/m².        A/P:  1)  dementia with primary progressive aphasia.  New to me.  They will follow up with the neurologist this month as previously recommended  2)  hypertension.  New to me.  Fairly well-controlled.  Continue current medication for now  3)  depression.  New to me.  Stable.  Continue with Celexa   4)  hyperlipidemia.  New to me.  Status unknown.  We'll check fasting labs.  We may consider stopping the fenofibrate and changing Livalo 2 to less expensive   5)  GERD with history of esophagitis and Schatzki's ring.  Asymptomatic.  Continue current medication  6)  elevated LFTs.  New to me.  Status unknown.  We will recheck LFTs  7)  chronic disease.  New to me.  Status unknown.  We will check labs and studies.    8)  FARA.  New to me.  Untreated.  If she changes her mind regarding CPAP treatment, she will let me know    I will see her back in 3-4 weeks or sooner if needed

## 2018-06-12 ENCOUNTER — OFFICE VISIT (OUTPATIENT)
Dept: FAMILY MEDICINE | Facility: CLINIC | Age: 68
End: 2018-06-12
Payer: MEDICARE

## 2018-06-12 VITALS
TEMPERATURE: 98 F | HEART RATE: 76 BPM | HEIGHT: 60 IN | SYSTOLIC BLOOD PRESSURE: 130 MMHG | WEIGHT: 152.25 LBS | DIASTOLIC BLOOD PRESSURE: 70 MMHG | RESPIRATION RATE: 18 BRPM | BODY MASS INDEX: 29.89 KG/M2

## 2018-06-12 DIAGNOSIS — E78.5 HYPERLIPIDEMIA, UNSPECIFIED HYPERLIPIDEMIA TYPE: ICD-10-CM

## 2018-06-12 DIAGNOSIS — E03.4 HYPOTHYROIDISM DUE TO ACQUIRED ATROPHY OF THYROID: ICD-10-CM

## 2018-06-12 DIAGNOSIS — I10 HYPERTENSION, ESSENTIAL: Primary | ICD-10-CM

## 2018-06-12 PROCEDURE — 99214 OFFICE O/P EST MOD 30 MIN: CPT | Mod: S$GLB,,, | Performed by: FAMILY MEDICINE

## 2018-06-12 RX ORDER — BENZONATATE 200 MG/1
200 CAPSULE ORAL 3 TIMES DAILY PRN
Qty: 30 CAPSULE | Refills: 0 | Status: SHIPPED | OUTPATIENT
Start: 2018-06-12 | End: 2018-10-28 | Stop reason: SDUPTHER

## 2018-06-13 NOTE — PROGRESS NOTES
Subjective:       Patient ID: Cindy Escobedo is a 68 y.o. female.    Chief Complaint: Hypertension (4 week follow up ) and Cough (for about one week)    HPI     The patient is a 68-year-old who is here today with her  for a follow-up visit.  Today is her 2nd visit together.  Today we discussed the followin)  Cough.  She has a awful cough.  Her cough has been constant for the past week.  Her cough is occasionally productive with green phlegm.  She denies any fevers or chills .  She denies any shortness of breath.  She denies a significant sinus congestion or rhinorrhea.    2)  Abnormal TSH.  We did review her recent slightly elevated TSH of 5.870 and normal T4 of 0.94.  She denies any significant symptoms associated with hypothyroidism.  We did discuss considering a trial of Synthroid or simply rechecking her TSH in 4-6 months.  Given the amount of medicine she is already taking, she is not interested in trying Synthroid and would like to just check her TSH later this year    3)  pre diabetes/impaired fasting glucose.  We did discuss her recent A1c of 5.7 and her recent fasting sugar of 113.  She denies any polyuria, polydipsia or polyphagia    4)  Hyperlipidemia.  She is currently taking fibricor and Livalo.  We did review her recent cholesterol panel which was remarkable for total cholesterol 165, triglycerides of 115 and LDL of 106    5)  Chronic kidney disease.  We did review her recent GFR of 51.  We did discuss this disease process         Review of Systems   Constitutional: Negative for appetite change, chills, diaphoresis, fatigue, fever and unexpected weight change.   HENT: Negative for congestion, ear pain, postnasal drip, rhinorrhea, sinus pressure, sneezing, sore throat and trouble swallowing.    Eyes: Negative for pain, discharge and visual disturbance.   Respiratory: Positive for cough. Negative for chest tightness, shortness of breath and wheezing.    Cardiovascular: Negative for  chest pain, palpitations and leg swelling.   Gastrointestinal: Negative for abdominal distention, abdominal pain, blood in stool, constipation, diarrhea, nausea and vomiting.   Skin: Negative for rash.       Objective:      Physical Exam   Constitutional: She is oriented to person, place, and time. She appears well-developed and well-nourished. No distress.   She is a poor historian   HENT:   Head: Normocephalic and atraumatic.   Right Ear: Hearing, tympanic membrane, external ear and ear canal normal.   Left Ear: Hearing, tympanic membrane, external ear and ear canal normal.   Nose: Nose normal.   Mouth/Throat: Oropharynx is clear and moist and mucous membranes are normal. No oral lesions. No oropharyngeal exudate, posterior oropharyngeal edema or posterior oropharyngeal erythema.   Eyes: Conjunctivae, EOM and lids are normal. Pupils are equal, round, and reactive to light. No scleral icterus.   Neck: Normal range of motion. Neck supple. Carotid bruit is not present. No thyroid mass and no thyromegaly present.   Cardiovascular: Normal rate, regular rhythm and normal heart sounds.   No extrasystoles are present. PMI is not displaced.  Exam reveals no gallop.    No murmur heard.  Pulmonary/Chest: Effort normal and breath sounds normal. No accessory muscle usage. No respiratory distress.   Clear to auscultation bilaterally.   Abdominal: Soft. Normal appearance and bowel sounds are normal. She exhibits no abdominal bruit. There is no hepatosplenomegaly. There is no tenderness. There is no rebound.   Lymphadenopathy:        Head (right side): No submental and no submandibular adenopathy present.        Head (left side): No submental and no submandibular adenopathy present.        Right cervical: No superficial cervical, no deep cervical and no posterior cervical adenopathy present.       Left cervical: No superficial cervical, no deep cervical and no posterior cervical adenopathy present.        Right: No  supraclavicular adenopathy present.        Left: No supraclavicular adenopathy present.   Neurological: She is alert and oriented to person, place, and time.   Skin: Skin is warm, dry and intact.   Psychiatric: She has a normal mood and affect. Cognition and memory are impaired.     Blood pressure 130/70, pulse 76, temperature 98.3 °F (36.8 °C), temperature source Oral, resp. rate 18, height 5' (1.524 m), weight 69 kg (152 lb 3.6 oz).Body mass index is 29.73 kg/m².            A/P:  1)  bronchitis.  Acute.  Continue symptomatic/supportive care.  I did give her prescription for Tessalon Perles.  If she is not doing better by the end of this week or if she develops any new or worsening symptoms, she will let us know    2)  Abnormal thyroid function test.  New.  Currently asymptomatic.  We will recheck a TSH in 6 months or sooner if needed    3)  Pre diabetes/impaired fasting glucose.  New.  She will work on diet exercise and weight loss.  We will recheck an A1c in 1 year    4)  hyperlipidemia.  Well controlled.  Continue current medication.  Recheck labs in 6 months    5)  Chronic kidney disease.  Stable.  Recheck labs in 6 months

## 2018-06-21 ENCOUNTER — PATIENT MESSAGE (OUTPATIENT)
Dept: CARDIOLOGY | Facility: CLINIC | Age: 68
End: 2018-06-21

## 2018-06-24 ENCOUNTER — PATIENT MESSAGE (OUTPATIENT)
Dept: CARDIOLOGY | Facility: CLINIC | Age: 68
End: 2018-06-24

## 2018-06-25 NOTE — TELEPHONE ENCOUNTER
Please advise: pt. Wants to know Is there any other Rx to replace Livalo that would not cast as much?

## 2018-08-10 DIAGNOSIS — K21.9 GASTROESOPHAGEAL REFLUX DISEASE, ESOPHAGITIS PRESENCE NOT SPECIFIED: Primary | ICD-10-CM

## 2018-08-30 ENCOUNTER — TELEPHONE (OUTPATIENT)
Dept: NEUROLOGY | Facility: CLINIC | Age: 68
End: 2018-08-30

## 2018-08-30 NOTE — TELEPHONE ENCOUNTER
----- Message from Deanna Rivera sent at 8/30/2018  4:14 PM CDT -----  Type:  Sooner Apoointment Request    Caller is requesting a sooner appointment.  Caller declined first available appointment listed below.  Caller will not accept being placed on the waitlist and is requesting a message be sent to doctor.    Name of Caller:  /Santiago  When is the first available appointment?  9/6/18  Best Call Back Number:  [phone]  Additional Information:  Went to ER 8/29/18 with a severe headache on the left side of the head, the same side that she previously had an aneurysm before. He states she needs to be seen as soon as possible, tomorrow. Please call to advise.

## 2018-08-30 NOTE — TELEPHONE ENCOUNTER
Spoke with  and let him know that Dr Ernst is not in tomorrow. That the next available slot will be 9/6.

## 2018-10-28 RX ORDER — BENZONATATE 200 MG/1
CAPSULE ORAL
Qty: 30 CAPSULE | Refills: 0 | Status: SHIPPED | OUTPATIENT
Start: 2018-10-28 | End: 2019-08-20

## 2018-12-04 ENCOUNTER — LAB VISIT (OUTPATIENT)
Dept: LAB | Facility: HOSPITAL | Age: 68
End: 2018-12-04
Payer: MEDICARE

## 2018-12-04 DIAGNOSIS — E03.4 HYPOTHYROIDISM DUE TO ACQUIRED ATROPHY OF THYROID: ICD-10-CM

## 2018-12-04 DIAGNOSIS — E78.5 HYPERLIPIDEMIA, UNSPECIFIED HYPERLIPIDEMIA TYPE: ICD-10-CM

## 2018-12-04 DIAGNOSIS — I10 HYPERTENSION, ESSENTIAL: ICD-10-CM

## 2018-12-04 LAB
ALBUMIN SERPL BCP-MCNC: 4.2 G/DL
ALP SERPL-CCNC: 72 U/L
ALT SERPL W/O P-5'-P-CCNC: 64 U/L
ANION GAP SERPL CALC-SCNC: 6 MMOL/L
AST SERPL-CCNC: 44 U/L
BILIRUB SERPL-MCNC: 1.1 MG/DL
BUN SERPL-MCNC: 15 MG/DL
CALCIUM SERPL-MCNC: 10.1 MG/DL
CHLORIDE SERPL-SCNC: 107 MMOL/L
CHOLEST SERPL-MCNC: 166 MG/DL
CHOLEST/HDLC SERPL: 4.7 {RATIO}
CO2 SERPL-SCNC: 27 MMOL/L
CREAT SERPL-MCNC: 0.9 MG/DL
EST. GFR  (AFRICAN AMERICAN): >60 ML/MIN/1.73 M^2
EST. GFR  (NON AFRICAN AMERICAN): >60 ML/MIN/1.73 M^2
GLUCOSE SERPL-MCNC: 125 MG/DL
HDLC SERPL-MCNC: 35 MG/DL
HDLC SERPL: 21.1 %
LDLC SERPL CALC-MCNC: 98.2 MG/DL
NONHDLC SERPL-MCNC: 131 MG/DL
POTASSIUM SERPL-SCNC: 4 MMOL/L
PROT SERPL-MCNC: 7.7 G/DL
SODIUM SERPL-SCNC: 140 MMOL/L
TRIGL SERPL-MCNC: 164 MG/DL
TSH SERPL DL<=0.005 MIU/L-ACNC: 2.35 UIU/ML

## 2018-12-04 PROCEDURE — 84443 ASSAY THYROID STIM HORMONE: CPT

## 2018-12-04 PROCEDURE — 80053 COMPREHEN METABOLIC PANEL: CPT

## 2018-12-04 PROCEDURE — 36415 COLL VENOUS BLD VENIPUNCTURE: CPT | Mod: PO

## 2018-12-04 PROCEDURE — 80061 LIPID PANEL: CPT

## 2018-12-07 ENCOUNTER — PATIENT MESSAGE (OUTPATIENT)
Dept: FAMILY MEDICINE | Facility: CLINIC | Age: 68
End: 2018-12-07

## 2018-12-11 ENCOUNTER — OFFICE VISIT (OUTPATIENT)
Dept: FAMILY MEDICINE | Facility: CLINIC | Age: 68
End: 2018-12-11
Payer: MEDICARE

## 2018-12-11 VITALS
TEMPERATURE: 98 F | WEIGHT: 152.63 LBS | HEART RATE: 91 BPM | SYSTOLIC BLOOD PRESSURE: 134 MMHG | OXYGEN SATURATION: 96 % | HEIGHT: 60 IN | BODY MASS INDEX: 29.96 KG/M2 | DIASTOLIC BLOOD PRESSURE: 88 MMHG | RESPIRATION RATE: 16 BRPM

## 2018-12-11 DIAGNOSIS — R73.03 PREDIABETES: ICD-10-CM

## 2018-12-11 DIAGNOSIS — R79.89 ELEVATED LFTS: ICD-10-CM

## 2018-12-11 DIAGNOSIS — R79.9 ABNORMAL FINDING OF BLOOD CHEMISTRY: ICD-10-CM

## 2018-12-11 DIAGNOSIS — F03.91 DEMENTIA WITH BEHAVIORAL DISTURBANCE, UNSPECIFIED DEMENTIA TYPE: Primary | ICD-10-CM

## 2018-12-11 PROCEDURE — 99214 OFFICE O/P EST MOD 30 MIN: CPT | Mod: S$GLB,,, | Performed by: FAMILY MEDICINE

## 2018-12-11 RX ORDER — MEMANTINE HYDROCHLORIDE 5 MG/1
TABLET ORAL
Refills: 3 | COMMUNITY
Start: 2018-11-27 | End: 2019-08-20 | Stop reason: DRUGHIGH

## 2018-12-11 RX ORDER — DONEPEZIL HYDROCHLORIDE 5 MG/1
TABLET, FILM COATED ORAL
Refills: 3 | COMMUNITY
Start: 2018-11-27 | End: 2019-08-20 | Stop reason: DRUGHIGH

## 2018-12-11 RX ORDER — METHYLPHENIDATE HYDROCHLORIDE 10 MG/1
TABLET ORAL
Refills: 0 | COMMUNITY
Start: 2018-11-29 | End: 2020-05-27

## 2018-12-14 NOTE — PROGRESS NOTES
Subjective:       Patient ID: Cindy Escobedo is a 68 y.o. female.    Chief Complaint: Follow-up (6 month with labs before appt)    HPI   The patient is a 68-year-old who is here today for her 6 month follow-up.  She did have labs prior to this visit.  She is here today with her .  Today we discussed the followin)   Dementia.  She is being treated for dementia by Dr. Montalvo who is a psychiatrist and neurologist.  She is currently on Aricept and Ritalin which has helped a little bit.  She is very forgetful.  She does not like going out and has stop socializing  2) depression.  Again, she is seeing a psychiatrist.  She is currently taking Celexa and Ritalin which has helped some.  She is frequently irritable and short-tempered  3) elevated LFTs.  We did review her recent LFTs which were slightly elevated.  Her AST was 44 and her ALT was 64.  She does have a history of fatty liver noted on prior ultrasound.    4) hypertension.  She is currently taking Diovan HCT and Toprol.  Her blood pressure today is good at 134/88  5) hyperlipidemia.  She is currently taking Livalo and fenofibrate.  Her recent total cholesterol was 166, LDL was 92 and her triglyceride level was 164        Review of Systems   Constitutional: Negative for appetite change, chills, diaphoresis, fatigue, fever and unexpected weight change.   HENT: Negative for congestion, ear pain, postnasal drip, rhinorrhea, sinus pressure, sneezing, sore throat and trouble swallowing.    Eyes: Negative for pain, discharge and visual disturbance.   Respiratory: Negative for cough, chest tightness, shortness of breath and wheezing.    Cardiovascular: Negative for chest pain, palpitations and leg swelling.   Gastrointestinal: Negative for abdominal distention, abdominal pain, blood in stool, constipation, diarrhea, nausea and vomiting.   Skin: Negative for rash.   Neurological:        Per HPI   Psychiatric/Behavioral: Positive for confusion and dysphoric  mood. Negative for self-injury and suicidal ideas.       Objective:      Physical Exam   Constitutional: She is oriented to person, place, and time. She appears well-developed and well-nourished. No distress.   She is is quiet and a bit withdrawn   HENT:   Head: Normocephalic and atraumatic.   Right Ear: Hearing, tympanic membrane, external ear and ear canal normal.   Left Ear: Hearing, tympanic membrane, external ear and ear canal normal.   Nose: Nose normal.   Mouth/Throat: Oropharynx is clear and moist and mucous membranes are normal. No oral lesions. No oropharyngeal exudate, posterior oropharyngeal edema or posterior oropharyngeal erythema.   Eyes: Conjunctivae, EOM and lids are normal. Pupils are equal, round, and reactive to light. No scleral icterus.   Neck: Normal range of motion. Neck supple. Carotid bruit is not present. No thyroid mass and no thyromegaly present.   Cardiovascular: Normal rate, regular rhythm and normal heart sounds.  No extrasystoles are present. PMI is not displaced. Exam reveals no gallop.   No murmur heard.  Pulmonary/Chest: Effort normal and breath sounds normal. No accessory muscle usage. No respiratory distress.   Clear to auscultation bilaterally.   Abdominal: Soft. Normal appearance and bowel sounds are normal. She exhibits no abdominal bruit. There is no hepatosplenomegaly. There is no tenderness. There is no rebound.   Lymphadenopathy:        Head (right side): No submental and no submandibular adenopathy present.        Head (left side): No submental and no submandibular adenopathy present.        Right cervical: No superficial cervical, no deep cervical and no posterior cervical adenopathy present.       Left cervical: No superficial cervical, no deep cervical and no posterior cervical adenopathy present.        Right: No supraclavicular adenopathy present.        Left: No supraclavicular adenopathy present.   Neurological: She is alert and oriented to person, place, and  time.   Skin: Skin is warm, dry and intact.   Psychiatric: She has a normal mood and affect. Her speech is delayed. She is slowed. Cognition and memory are impaired.     Blood pressure 134/88, pulse 91, temperature 98.3 °F (36.8 °C), temperature source Oral, resp. rate 16, height 5' (1.524 m), weight 69.2 kg (152 lb 9.6 oz), SpO2 96 %.Body mass index is 29.8 kg/m².              A/P:  1) dementia.  She will continue following with Dr. Tiwari and continue with medication under his direction.  Her recent TSH was normal.  We will check a TSH and a B12 with her next set of labs  2) depression.  Uncertain control.  They will follow up with Dr. Tiwari with medicine adjustments as needed  3) elevated LFTs likely due to fatty liver disease. Stable.  Given her current medical issues, we are not going to pursue further evaluation but will just monitor this condition moving forward  4) hypertension.  Well controlled.  Continue current medication  5)   Hyperlipidemia.  Well controlled.  Continue current medication.  We will check labs again in 6 months  6) pre diabetes.  We will check an A1c with her next set of labs              I will see her back in 8 months with labs prior to that visit or sooner if needed

## 2019-01-17 ENCOUNTER — TELEPHONE (OUTPATIENT)
Dept: FAMILY MEDICINE | Facility: CLINIC | Age: 69
End: 2019-01-17

## 2019-01-17 DIAGNOSIS — Z12.31 BREAST CANCER SCREENING BY MAMMOGRAM: Primary | ICD-10-CM

## 2019-01-17 NOTE — TELEPHONE ENCOUNTER
----- Message from Eveline Akbar sent at 1/17/2019 11:12 AM CST -----  Contact: /Santiago Henderson stated his wife received a letter to schedule a mammogram, need orders to schedule.  Please call to advise  Call back  or cell   Thanks

## 2019-01-21 ENCOUNTER — OFFICE VISIT (OUTPATIENT)
Dept: CARDIOLOGY | Facility: CLINIC | Age: 69
End: 2019-01-21
Payer: MEDICARE

## 2019-01-21 VITALS
BODY MASS INDEX: 29.9 KG/M2 | HEART RATE: 67 BPM | DIASTOLIC BLOOD PRESSURE: 80 MMHG | WEIGHT: 152.31 LBS | SYSTOLIC BLOOD PRESSURE: 126 MMHG | HEIGHT: 60 IN

## 2019-01-21 DIAGNOSIS — R00.2 PALPITATIONS: Chronic | ICD-10-CM

## 2019-01-21 DIAGNOSIS — E78.5 DYSLIPIDEMIA: Chronic | ICD-10-CM

## 2019-01-21 DIAGNOSIS — I72.6 VERTEBRAL ARTERY ANEURYSM: Primary | ICD-10-CM

## 2019-01-21 DIAGNOSIS — E78.2 MIXED HYPERLIPIDEMIA: ICD-10-CM

## 2019-01-21 DIAGNOSIS — I65.21 ASYMPTOMATIC STENOSIS OF RIGHT CAROTID ARTERY: Chronic | ICD-10-CM

## 2019-01-21 DIAGNOSIS — I10 ESSENTIAL HYPERTENSION: Chronic | ICD-10-CM

## 2019-01-21 PROCEDURE — 99214 OFFICE O/P EST MOD 30 MIN: CPT | Mod: S$PBB,,, | Performed by: INTERNAL MEDICINE

## 2019-01-21 PROCEDURE — 99999 PR PBB SHADOW E&M-EST. PATIENT-LVL III: CPT | Mod: PBBFAC,,, | Performed by: INTERNAL MEDICINE

## 2019-01-21 PROCEDURE — 99214 PR OFFICE/OUTPT VISIT, EST, LEVL IV, 30-39 MIN: ICD-10-PCS | Mod: S$PBB,,, | Performed by: INTERNAL MEDICINE

## 2019-01-21 PROCEDURE — 99213 OFFICE O/P EST LOW 20 MIN: CPT | Mod: PBBFAC,PO | Performed by: INTERNAL MEDICINE

## 2019-01-21 PROCEDURE — 99999 PR PBB SHADOW E&M-EST. PATIENT-LVL III: ICD-10-PCS | Mod: PBBFAC,,, | Performed by: INTERNAL MEDICINE

## 2019-01-21 RX ORDER — ATENOLOL 25 MG/1
25 TABLET ORAL 2 TIMES DAILY
Qty: 180 TABLET | Refills: 3 | Status: SHIPPED | OUTPATIENT
Start: 2019-01-21 | End: 2020-03-17

## 2019-01-21 NOTE — PROGRESS NOTES
Subjective:    Patient ID:  Cindy Escobedo is a 68 y.o. female who presents for follow-up of No chief complaint on file.      HPI  Ms. Escobedo here for follow up of palpitations, DLP, vertebral artery aneurysm. Patient denies palpitations, syncope, presyncope, lightheadedness or dizziness.  Patients states is doing well no chest pain, SOB or change in exertional tolerence.   Dementia increasing as is depression         Review of Systems   Constitution: Negative for malaise/fatigue.   Eyes: Negative for blurred vision.   Cardiovascular: Negative for chest pain, claudication, cyanosis, dyspnea on exertion, irregular heartbeat, leg swelling, near-syncope, orthopnea, palpitations, paroxysmal nocturnal dyspnea and syncope.   Respiratory: Negative for cough and shortness of breath.    Hematologic/Lymphatic: Does not bruise/bleed easily.   Musculoskeletal: Negative for back pain, falls, joint pain, muscle cramps, muscle weakness and myalgias.   Gastrointestinal: Negative for abdominal pain, change in bowel habit, nausea and vomiting.   Genitourinary: Negative for urgency.   Neurological: Negative for dizziness, focal weakness and light-headedness.        Objective:    Physical Exam   Constitutional: She is oriented to person, place, and time. She appears well-developed and well-nourished.   HENT:   Head: Normocephalic.   Eyes: Conjunctivae are normal.   Neck: Normal range of motion. Neck supple. No JVD present.   Cardiovascular: Normal rate, regular rhythm, normal heart sounds and intact distal pulses.   Pulses:       Carotid pulses are 2+ on the right side, and 2+ on the left side.       Radial pulses are 2+ on the right side, and 2+ on the left side.        Dorsalis pedis pulses are 2+ on the right side, and 2+ on the left side.        Posterior tibial pulses are 2+ on the right side, and 2+ on the left side.   Pulmonary/Chest: Effort normal and breath sounds normal.   Abdominal: Soft. Bowel sounds are normal.    Musculoskeletal: She exhibits no edema or tenderness.   Neurological: She is alert and oriented to person, place, and time. Gait normal.   Skin: Skin is warm, dry and intact. No cyanosis. Nails show no clubbing.   Psychiatric: She has a normal mood and affect. Her speech is normal and behavior is normal. Thought content normal.               ..    Chemistry        Component Value Date/Time     12/04/2018 1116    K 4.0 12/04/2018 1116     12/04/2018 1116    CO2 27 12/04/2018 1116    BUN 15 12/04/2018 1116    CREATININE 0.9 12/04/2018 1116     (H) 12/04/2018 1116        Component Value Date/Time    CALCIUM 10.1 12/04/2018 1116    ALKPHOS 72 12/04/2018 1116    AST 44 (H) 12/04/2018 1116    ALT 64 (H) 12/04/2018 1116    BILITOT 1.1 (H) 12/04/2018 1116    ESTGFRAFRICA >60.0 12/04/2018 1116    EGFRNONAA >60.0 12/04/2018 1116            ..  Lab Results   Component Value Date    CHOL 166 12/04/2018    CHOL 165 05/22/2018    CHOL 188 12/06/2017     Lab Results   Component Value Date    HDL 35 (L) 12/04/2018    HDL 36 (L) 05/22/2018    HDL 37 (L) 12/06/2017     Lab Results   Component Value Date    LDLCALC 98.2 12/04/2018    LDLCALC 106.0 05/22/2018    LDLCALC 126.2 12/06/2017     Lab Results   Component Value Date    TRIG 164 (H) 12/04/2018    TRIG 115 05/22/2018    TRIG 124 12/06/2017     Lab Results   Component Value Date    CHOLHDL 21.1 12/04/2018    CHOLHDL 21.8 05/22/2018    CHOLHDL 19.7 (L) 12/06/2017     ..  Lab Results   Component Value Date    WBC 10.23 08/29/2018    HGB 14.4 08/29/2018    HCT 40.6 08/29/2018    MCV 90 08/29/2018     08/29/2018       Test(s) Reviewed  I have reviewed the following in detail:  [] Stress test   [] Angiography   [x] Echocardiogram   [x] Labs   [] Other:       Assessment:         ICD-10-CM ICD-9-CM   1. Vertebral artery aneurysm I72.6 442.81   2. Essential hypertension I10 401.9   3. Mixed hyperlipidemia E78.2 272.2   4. Dyslipidemia E78.5 272.4   5.  Palpitations R00.2 785.1   6. Asymptomatic stenosis of right carotid artery I65.21 433.10     Problem List Items Addressed This Visit     Vertebral artery aneurysm - Primary    Overview     7/2012 angio  4.5 millimeter diameter fusiform left V4 segment aneurysm  Minimal intracranial atherosclerosis         Palpitations (Chronic)    Overview     05/10/2001 all coronaries normal;          Mixed hyperlipidemia    Essential hypertension (Chronic)    Dyslipidemia (Chronic)    Asymptomatic stenosis of right carotid artery (Chronic)           Plan:           Return to clinic 6 months   Low level/low impact aerobic exercise 5x's/wk. Heart healthy diet and risk factor modification.    See labs and med orders.  Hold livalo   Change to atenolol see if helps depression.

## 2019-02-19 ENCOUNTER — HOSPITAL ENCOUNTER (OUTPATIENT)
Dept: RADIOLOGY | Facility: HOSPITAL | Age: 69
Discharge: HOME | End: 2019-02-19
Attending: FAMILY MEDICINE
Payer: MEDICARE

## 2019-02-19 DIAGNOSIS — Z12.31 BREAST CANCER SCREENING BY MAMMOGRAM: ICD-10-CM

## 2019-02-19 PROCEDURE — 77067 SCR MAMMO BI INCL CAD: CPT | Mod: 26,,, | Performed by: RADIOLOGY

## 2019-02-19 PROCEDURE — 77063 BREAST TOMOSYNTHESIS BI: CPT | Mod: 26,,, | Performed by: RADIOLOGY

## 2019-02-19 PROCEDURE — 77067 MAMMO DIGITAL SCREENING BILAT WITH TOMOSYNTHESIS_CAD: ICD-10-PCS | Mod: 26,,, | Performed by: RADIOLOGY

## 2019-02-19 PROCEDURE — 77067 SCR MAMMO BI INCL CAD: CPT | Mod: TC,PO

## 2019-02-19 PROCEDURE — 77063 MAMMO DIGITAL SCREENING BILAT WITH TOMOSYNTHESIS_CAD: ICD-10-PCS | Mod: 26,,, | Performed by: RADIOLOGY

## 2019-02-20 ENCOUNTER — PATIENT MESSAGE (OUTPATIENT)
Dept: FAMILY MEDICINE | Facility: CLINIC | Age: 69
End: 2019-02-20

## 2019-03-02 DIAGNOSIS — K21.9 GASTROESOPHAGEAL REFLUX DISEASE, ESOPHAGITIS PRESENCE NOT SPECIFIED: ICD-10-CM

## 2019-04-18 ENCOUNTER — PATIENT MESSAGE (OUTPATIENT)
Dept: FAMILY MEDICINE | Facility: CLINIC | Age: 69
End: 2019-04-18

## 2019-04-18 DIAGNOSIS — K21.00 GASTROESOPHAGEAL REFLUX DISEASE WITH ESOPHAGITIS: ICD-10-CM

## 2019-04-18 RX ORDER — PANTOPRAZOLE SODIUM 40 MG/1
40 TABLET, DELAYED RELEASE ORAL DAILY
Qty: 90 TABLET | Refills: 0 | Status: SHIPPED | OUTPATIENT
Start: 2019-04-18 | End: 2020-05-27

## 2019-05-09 ENCOUNTER — OFFICE VISIT (OUTPATIENT)
Dept: OPTOMETRY | Facility: CLINIC | Age: 69
End: 2019-05-09
Payer: MEDICARE

## 2019-05-09 DIAGNOSIS — H25.13 NUCLEAR SCLEROSIS, BILATERAL: Primary | ICD-10-CM

## 2019-05-09 DIAGNOSIS — H52.7 REFRACTIVE ERROR: ICD-10-CM

## 2019-05-09 PROCEDURE — 92015 PR REFRACTION: ICD-10-PCS | Mod: ,,, | Performed by: OPTOMETRIST

## 2019-05-09 PROCEDURE — 92014 COMPRE OPH EXAM EST PT 1/>: CPT | Mod: S$PBB,,, | Performed by: OPTOMETRIST

## 2019-05-09 PROCEDURE — 99999 PR PBB SHADOW E&M-EST. PATIENT-LVL II: CPT | Mod: PBBFAC,,, | Performed by: OPTOMETRIST

## 2019-05-09 PROCEDURE — 92015 DETERMINE REFRACTIVE STATE: CPT | Mod: ,,, | Performed by: OPTOMETRIST

## 2019-05-09 PROCEDURE — 99212 OFFICE O/P EST SF 10 MIN: CPT | Mod: PBBFAC,PO | Performed by: OPTOMETRIST

## 2019-05-09 PROCEDURE — 92014 PR EYE EXAM, EST PATIENT,COMPREHESV: ICD-10-PCS | Mod: S$PBB,,, | Performed by: OPTOMETRIST

## 2019-05-09 PROCEDURE — 99999 PR PBB SHADOW E&M-EST. PATIENT-LVL II: ICD-10-PCS | Mod: PBBFAC,,, | Performed by: OPTOMETRIST

## 2019-05-09 RX ORDER — ZOLPIDEM TARTRATE 5 MG/1
5 TABLET ORAL NIGHTLY PRN
Refills: 1 | COMMUNITY
Start: 2019-03-25 | End: 2023-07-05 | Stop reason: SDUPTHER

## 2019-05-09 NOTE — PROGRESS NOTES
HPI     DLS- 6/21/17     Pt is here for routine eye exam. Pt states no va changes since last seen.   Denies eye pain. Denies flashes or floaters. No gtts. Pt states high marjan is   controlled with meds.     Last edited by Chris Shine on 5/9/2019  8:44 AM. (History)            Assessment /Plan     For exam results, see Encounter Report.    Nuclear sclerosis, bilateral    Refractive error      1. Educated pt on presence of cataracts and effects on vision. No surgery at this time. Recheck in one year.  2. New Spec Rx given. Different lens options discussed with patient. RTC 1 year full exam.

## 2019-05-28 RX ORDER — VALSARTAN AND HYDROCHLOROTHIAZIDE 320; 12.5 MG/1; MG/1
TABLET, FILM COATED ORAL
Qty: 90 TABLET | Refills: 4 | Status: SHIPPED | OUTPATIENT
Start: 2019-05-28 | End: 2019-07-31

## 2019-06-18 DIAGNOSIS — K21.9 GASTROESOPHAGEAL REFLUX DISEASE, ESOPHAGITIS PRESENCE NOT SPECIFIED: ICD-10-CM

## 2019-07-31 ENCOUNTER — OFFICE VISIT (OUTPATIENT)
Dept: CARDIOLOGY | Facility: CLINIC | Age: 69
End: 2019-07-31
Payer: MEDICARE

## 2019-07-31 VITALS
WEIGHT: 149.25 LBS | SYSTOLIC BLOOD PRESSURE: 143 MMHG | HEART RATE: 71 BPM | BODY MASS INDEX: 29.3 KG/M2 | HEIGHT: 60 IN | DIASTOLIC BLOOD PRESSURE: 97 MMHG

## 2019-07-31 DIAGNOSIS — R00.2 PALPITATIONS: Chronic | ICD-10-CM

## 2019-07-31 DIAGNOSIS — I10 ESSENTIAL HYPERTENSION: Chronic | ICD-10-CM

## 2019-07-31 DIAGNOSIS — R09.89 BRUIT: ICD-10-CM

## 2019-07-31 DIAGNOSIS — I72.6 VERTEBRAL ARTERY ANEURYSM: Primary | ICD-10-CM

## 2019-07-31 DIAGNOSIS — E78.2 MIXED HYPERLIPIDEMIA: ICD-10-CM

## 2019-07-31 DIAGNOSIS — I65.21 ASYMPTOMATIC STENOSIS OF RIGHT CAROTID ARTERY: Chronic | ICD-10-CM

## 2019-07-31 PROCEDURE — 99999 PR PBB SHADOW E&M-EST. PATIENT-LVL III: CPT | Mod: PBBFAC,,, | Performed by: INTERNAL MEDICINE

## 2019-07-31 PROCEDURE — 99214 PR OFFICE/OUTPT VISIT, EST, LEVL IV, 30-39 MIN: ICD-10-PCS | Mod: S$PBB,,, | Performed by: INTERNAL MEDICINE

## 2019-07-31 PROCEDURE — 99999 PR PBB SHADOW E&M-EST. PATIENT-LVL III: ICD-10-PCS | Mod: PBBFAC,,, | Performed by: INTERNAL MEDICINE

## 2019-07-31 PROCEDURE — 99213 OFFICE O/P EST LOW 20 MIN: CPT | Mod: PBBFAC,PO | Performed by: INTERNAL MEDICINE

## 2019-07-31 PROCEDURE — 99214 OFFICE O/P EST MOD 30 MIN: CPT | Mod: S$PBB,,, | Performed by: INTERNAL MEDICINE

## 2019-07-31 NOTE — PROGRESS NOTES
Subjective:    Patient ID:  Cindy Escobedo is a 69 y.o. female who presents for follow-up of No chief complaint on file.      HPI  Ms. Escobedo here for follow up of palpitations, DLP, vertebral artery aneurysm. No exercise but very active. No angina. Patient denies palpitations, syncope, presyncope, lightheadedness or dizziness.      Review of Systems   Constitution: Negative for malaise/fatigue.   Eyes: Negative for blurred vision.   Cardiovascular: Negative for chest pain, claudication, cyanosis, dyspnea on exertion, irregular heartbeat, leg swelling, near-syncope, orthopnea, palpitations, paroxysmal nocturnal dyspnea and syncope.   Respiratory: Negative for cough and shortness of breath.    Hematologic/Lymphatic: Does not bruise/bleed easily.   Musculoskeletal: Negative for back pain, falls, joint pain, muscle cramps, muscle weakness and myalgias.   Gastrointestinal: Negative for abdominal pain, change in bowel habit, nausea and vomiting.   Genitourinary: Negative for urgency.   Neurological: Negative for dizziness, focal weakness and light-headedness.       Past Medical History:   Diagnosis Date    Allergy     Asymptomatic stenosis of right carotid artery 5/7/2012    Carotid arterial disease     mild in 2012    Cerebral aneurysm 2012    vertebral artery    Dementia     Depression     Diverticular disease     noted on cscope    Essential hypertension 5/7/2012    Fatty liver     H/O esophagogastroduodenoscopy     last 8/17    HEARING LOSS     History of esophagitis     noted on 8/17 EGD    History of gastritis     noted on EGD    HTN (hypertension)     Hyperlipidemia     LFT elevation     US 1/18 with fatty liver    Mixed hyperlipidemia 1/4/2017    FARA (obstructive sleep apnea)     no CPAP    Osteoarthritis     elevated CRP    Palpitations 5/7/2012    05/10/2001 all coronaries normal;      Prediabetes     Primary progressive aphasia     S/P colonoscopy     3/19 repeat 3/29    Je  ring     s/p dilation 8/17    TIA (transient ischemic attack)     Valvular heart disease     mod NE 4/16    Vertebral artery aneurysm 3/28/2013    7/2012 angio 4.5 millimeter diameter fusiform left V4 segment aneurysm Minimal intracranial atherosclerosis           Objective:     Vitals:    07/31/19 1130   BP: (!) 143/97   Pulse: 71        Physical Exam   Constitutional: She is oriented to person, place, and time. She appears well-developed and well-nourished.   HENT:   Head: Normocephalic.   Eyes: Conjunctivae are normal.   Neck: Normal range of motion. Neck supple. No JVD present.   Cardiovascular: Normal rate, regular rhythm, normal heart sounds and intact distal pulses.   Pulses:       Carotid pulses are 2+ on the right side, and 2+ on the left side.       Radial pulses are 2+ on the right side, and 2+ on the left side.        Dorsalis pedis pulses are 2+ on the right side, and 2+ on the left side.        Posterior tibial pulses are 2+ on the right side, and 2+ on the left side.   Pulmonary/Chest: Effort normal and breath sounds normal.   Abdominal: Soft. Bowel sounds are normal.   Musculoskeletal: She exhibits no edema or tenderness.   Neurological: She is alert and oriented to person, place, and time. Gait normal.   Skin: Skin is warm, dry and intact. No cyanosis. Nails show no clubbing.   Psychiatric: She has a normal mood and affect. Her speech is normal and behavior is normal. Thought content normal.             ..    Chemistry        Component Value Date/Time     12/04/2018 1116    K 4.0 12/04/2018 1116     12/04/2018 1116    CO2 27 12/04/2018 1116    BUN 15 12/04/2018 1116    CREATININE 0.9 12/04/2018 1116     (H) 12/04/2018 1116        Component Value Date/Time    CALCIUM 10.1 12/04/2018 1116    ALKPHOS 72 12/04/2018 1116    AST 44 (H) 12/04/2018 1116    ALT 64 (H) 12/04/2018 1116    BILITOT 1.1 (H) 12/04/2018 1116    ESTGFRAFRICA >60.0 12/04/2018 1116    EGFRNONAA >60.0 12/04/2018  1116            ..  Lab Results   Component Value Date    CHOL 166 12/04/2018    CHOL 165 05/22/2018    CHOL 188 12/06/2017     Lab Results   Component Value Date    HDL 35 (L) 12/04/2018    HDL 36 (L) 05/22/2018    HDL 37 (L) 12/06/2017     Lab Results   Component Value Date    LDLCALC 98.2 12/04/2018    LDLCALC 106.0 05/22/2018    LDLCALC 126.2 12/06/2017     Lab Results   Component Value Date    TRIG 164 (H) 12/04/2018    TRIG 115 05/22/2018    TRIG 124 12/06/2017     Lab Results   Component Value Date    CHOLHDL 21.1 12/04/2018    CHOLHDL 21.8 05/22/2018    CHOLHDL 19.7 (L) 12/06/2017     ..  Lab Results   Component Value Date    WBC 10.23 08/29/2018    HGB 14.4 08/29/2018    HCT 40.6 08/29/2018    MCV 90 08/29/2018     08/29/2018       Test(s) Reviewed  I have reviewed the following in detail:  [] Stress test   [] Angiography   [x] Echocardiogram   [x] Labs   [] Other:       Assessment:         ICD-10-CM ICD-9-CM   1. Vertebral artery aneurysm I72.6 442.81   2. Asymptomatic stenosis of right carotid artery I65.21 433.10   3. Essential hypertension I10 401.9   4. Mixed hyperlipidemia E78.2 272.2   5. Palpitations R00.2 785.1     Problem List Items Addressed This Visit     Vertebral artery aneurysm - Primary    Overview     7/2012 angio  4.5 millimeter diameter fusiform left V4 segment aneurysm  Minimal intracranial atherosclerosis         Palpitations (Chronic)    Overview     05/10/2001 all coronaries normal;          Mixed hyperlipidemia    Essential hypertension (Chronic)    Asymptomatic stenosis of right carotid artery (Chronic)           Plan:           Return to clinic 9 months   Low level/low impact aerobic exercise 5x's/wk. Heart healthy diet and risk factor modification.    See labs and med orders.  Neck CTA follow vert aneurysm.        Portions of this note may have been created with voice recognition software.  Grammatical, syntax and spelling errors may be inevitable.

## 2019-08-02 DIAGNOSIS — I72.6 VERTEBRAL ARTERY ANEURYSM: ICD-10-CM

## 2019-08-02 DIAGNOSIS — I65.23 BILATERAL CAROTID ARTERY STENOSIS: Chronic | ICD-10-CM

## 2019-08-02 DIAGNOSIS — R00.2 PALPITATIONS: Chronic | ICD-10-CM

## 2019-08-02 DIAGNOSIS — E78.5 DYSLIPIDEMIA: Chronic | ICD-10-CM

## 2019-08-02 RX ORDER — FENOFIBRIC ACID 45 MG/1
CAPSULE, DELAYED RELEASE ORAL
Qty: 90 CAPSULE | Refills: 4 | Status: SHIPPED | OUTPATIENT
Start: 2019-08-02 | End: 2020-07-06 | Stop reason: SDUPTHER

## 2019-08-07 ENCOUNTER — PATIENT OUTREACH (OUTPATIENT)
Dept: ADMINISTRATIVE | Facility: HOSPITAL | Age: 69
End: 2019-08-07

## 2019-08-07 DIAGNOSIS — Z78.0 POST-MENOPAUSAL: Primary | ICD-10-CM

## 2019-08-07 NOTE — LETTER
August 14, 2019    Cindy OJ Gregg  [address] [place] [address]             [facility]  [address]  [place] [address]  Phone: [phone] Dear Mrs. Escobedo:    We have tried to reach you by mychart unsuccessfully.      [facility] is committed to your overall health.  To help you get the most out of each of your visits, we will review your information to make sure you are up to date on all of your recommended tests and/or procedures.       Kourtney Yi MD  has found that your chart shows you may be due for the following:     Shingles Immunization   Influenza Vaccine   DEXA SCAN     If you have had any of the above done at another facility, please bring the records with you or fax them to [phone] so that your record at [facility] will be complete. If you have not had any of these tests or procedures done recently and would like to complete this testing ,  please call [phone] or send a message through your [facility] portal to your provider's office.     If you have an upcoming scheduled appointment for the above test and/or procedures, please disregard this letter.     If you are currently taking medication, please bring it with you to your appointment for review.     Sincerely,     Your [facility] Sirena Tse   Clinical Care Coordinator   [facility]

## 2019-08-13 ENCOUNTER — LAB VISIT (OUTPATIENT)
Dept: LAB | Facility: HOSPITAL | Age: 69
End: 2019-08-13
Attending: FAMILY MEDICINE
Payer: MEDICARE

## 2019-08-13 DIAGNOSIS — R73.03 PREDIABETES: ICD-10-CM

## 2019-08-13 DIAGNOSIS — R79.9 ABNORMAL FINDING OF BLOOD CHEMISTRY: ICD-10-CM

## 2019-08-13 DIAGNOSIS — R79.89 ELEVATED LFTS: ICD-10-CM

## 2019-08-13 DIAGNOSIS — F03.91 DEMENTIA WITH BEHAVIORAL DISTURBANCE, UNSPECIFIED DEMENTIA TYPE: ICD-10-CM

## 2019-08-13 LAB
ALBUMIN SERPL BCP-MCNC: 3.9 G/DL (ref 3.5–5.2)
ALP SERPL-CCNC: 73 U/L (ref 55–135)
ALT SERPL W/O P-5'-P-CCNC: 24 U/L (ref 10–44)
ANION GAP SERPL CALC-SCNC: 7 MMOL/L (ref 8–16)
AST SERPL-CCNC: 19 U/L (ref 10–40)
BASOPHILS # BLD AUTO: 0.05 K/UL (ref 0–0.2)
BASOPHILS NFR BLD: 0.8 % (ref 0–1.9)
BILIRUB SERPL-MCNC: 1 MG/DL (ref 0.1–1)
BUN SERPL-MCNC: 18 MG/DL (ref 8–23)
CALCIUM SERPL-MCNC: 9.7 MG/DL (ref 8.7–10.5)
CHLORIDE SERPL-SCNC: 107 MMOL/L (ref 95–110)
CHOLEST SERPL-MCNC: 236 MG/DL (ref 120–199)
CHOLEST/HDLC SERPL: 6.2 {RATIO} (ref 2–5)
CO2 SERPL-SCNC: 27 MMOL/L (ref 23–29)
CREAT SERPL-MCNC: 0.9 MG/DL (ref 0.5–1.4)
DIFFERENTIAL METHOD: ABNORMAL
EOSINOPHIL # BLD AUTO: 0.1 K/UL (ref 0–0.5)
EOSINOPHIL NFR BLD: 1.7 % (ref 0–8)
ERYTHROCYTE [DISTWIDTH] IN BLOOD BY AUTOMATED COUNT: 13.3 % (ref 11.5–14.5)
EST. GFR  (AFRICAN AMERICAN): >60 ML/MIN/1.73 M^2
EST. GFR  (NON AFRICAN AMERICAN): >60 ML/MIN/1.73 M^2
ESTIMATED AVG GLUCOSE: 114 MG/DL (ref 68–131)
GLUCOSE SERPL-MCNC: 108 MG/DL (ref 70–110)
HBA1C MFR BLD HPLC: 5.6 % (ref 4–5.6)
HCT VFR BLD AUTO: 43.8 % (ref 37–48.5)
HDLC SERPL-MCNC: 38 MG/DL (ref 40–75)
HDLC SERPL: 16.1 % (ref 20–50)
HGB BLD-MCNC: 14.5 G/DL (ref 12–16)
IMM GRANULOCYTES # BLD AUTO: 0.03 K/UL (ref 0–0.04)
IMM GRANULOCYTES NFR BLD AUTO: 0.5 % (ref 0–0.5)
LDLC SERPL CALC-MCNC: 166.6 MG/DL (ref 63–159)
LYMPHOCYTES # BLD AUTO: 1.7 K/UL (ref 1–4.8)
LYMPHOCYTES NFR BLD: 28.6 % (ref 18–48)
MCH RBC QN AUTO: 31.4 PG (ref 27–31)
MCHC RBC AUTO-ENTMCNC: 33.1 G/DL (ref 32–36)
MCV RBC AUTO: 95 FL (ref 82–98)
MONOCYTES # BLD AUTO: 0.7 K/UL (ref 0.3–1)
MONOCYTES NFR BLD: 11.2 % (ref 4–15)
NEUTROPHILS # BLD AUTO: 3.4 K/UL (ref 1.8–7.7)
NEUTROPHILS NFR BLD: 57.2 % (ref 38–73)
NONHDLC SERPL-MCNC: 198 MG/DL
NRBC BLD-RTO: 0 /100 WBC
PLATELET # BLD AUTO: 247 K/UL (ref 150–350)
PMV BLD AUTO: 11.5 FL (ref 9.2–12.9)
POTASSIUM SERPL-SCNC: 4.1 MMOL/L (ref 3.5–5.1)
PROT SERPL-MCNC: 7.3 G/DL (ref 6–8.4)
RBC # BLD AUTO: 4.62 M/UL (ref 4–5.4)
SODIUM SERPL-SCNC: 141 MMOL/L (ref 136–145)
TRIGL SERPL-MCNC: 157 MG/DL (ref 30–150)
TSH SERPL DL<=0.005 MIU/L-ACNC: 3.71 UIU/ML (ref 0.4–4)
VIT B12 SERPL-MCNC: 422 PG/ML (ref 210–950)
WBC # BLD AUTO: 5.91 K/UL (ref 3.9–12.7)

## 2019-08-13 PROCEDURE — 85025 COMPLETE CBC W/AUTO DIFF WBC: CPT

## 2019-08-13 PROCEDURE — 83036 HEMOGLOBIN GLYCOSYLATED A1C: CPT

## 2019-08-13 PROCEDURE — 80061 LIPID PANEL: CPT

## 2019-08-13 PROCEDURE — 82607 VITAMIN B-12: CPT

## 2019-08-13 PROCEDURE — 36415 COLL VENOUS BLD VENIPUNCTURE: CPT | Mod: PO

## 2019-08-13 PROCEDURE — 80053 COMPREHEN METABOLIC PANEL: CPT

## 2019-08-13 PROCEDURE — 84443 ASSAY THYROID STIM HORMONE: CPT

## 2019-08-14 ENCOUNTER — PATIENT MESSAGE (OUTPATIENT)
Dept: FAMILY MEDICINE | Facility: CLINIC | Age: 69
End: 2019-08-14

## 2019-08-20 ENCOUNTER — OFFICE VISIT (OUTPATIENT)
Dept: FAMILY MEDICINE | Facility: CLINIC | Age: 69
End: 2019-08-20
Payer: MEDICARE

## 2019-08-20 VITALS
OXYGEN SATURATION: 96 % | BODY MASS INDEX: 28.79 KG/M2 | HEART RATE: 51 BPM | TEMPERATURE: 98 F | WEIGHT: 146.63 LBS | DIASTOLIC BLOOD PRESSURE: 72 MMHG | RESPIRATION RATE: 16 BRPM | SYSTOLIC BLOOD PRESSURE: 116 MMHG | HEIGHT: 60 IN

## 2019-08-20 DIAGNOSIS — F02.80 ALZHEIMER'S DEMENTIA WITHOUT BEHAVIORAL DISTURBANCE, UNSPECIFIED TIMING OF DEMENTIA ONSET: ICD-10-CM

## 2019-08-20 DIAGNOSIS — I77.9 CAROTID ARTERY DISEASE, UNSPECIFIED LATERALITY, UNSPECIFIED TYPE: ICD-10-CM

## 2019-08-20 DIAGNOSIS — G30.9 ALZHEIMER'S DEMENTIA WITHOUT BEHAVIORAL DISTURBANCE, UNSPECIFIED TIMING OF DEMENTIA ONSET: ICD-10-CM

## 2019-08-20 DIAGNOSIS — E78.5 HYPERLIPIDEMIA, UNSPECIFIED HYPERLIPIDEMIA TYPE: Primary | ICD-10-CM

## 2019-08-20 DIAGNOSIS — I10 HYPERTENSION, ESSENTIAL: ICD-10-CM

## 2019-08-20 PROCEDURE — 99214 OFFICE O/P EST MOD 30 MIN: CPT | Mod: S$GLB,,, | Performed by: FAMILY MEDICINE

## 2019-08-20 PROCEDURE — 99214 PR OFFICE/OUTPT VISIT, EST, LEVL IV, 30-39 MIN: ICD-10-PCS | Mod: S$GLB,,, | Performed by: FAMILY MEDICINE

## 2019-08-20 RX ORDER — PITAVASTATIN CALCIUM 2.09 MG/1
2 TABLET, FILM COATED ORAL NIGHTLY
Qty: 90 TABLET | Refills: 0 | Status: SHIPPED | OUTPATIENT
Start: 2019-08-20 | End: 2019-12-02 | Stop reason: SDUPTHER

## 2019-08-20 RX ORDER — MEMANTINE HYDROCHLORIDE 10 MG/1
10 TABLET ORAL 2 TIMES DAILY
Refills: 3 | COMMUNITY
Start: 2019-06-26 | End: 2022-05-23

## 2019-08-20 RX ORDER — DONEPEZIL HYDROCHLORIDE 10 MG/1
10 TABLET, FILM COATED ORAL NIGHTLY
Refills: 3 | COMMUNITY
Start: 2019-07-19 | End: 2022-05-23

## 2019-08-20 RX ORDER — ASPIRIN 81 MG/1
81 TABLET ORAL DAILY
Status: ON HOLD | COMMUNITY
End: 2020-08-18 | Stop reason: HOSPADM

## 2019-08-21 ENCOUNTER — PATIENT MESSAGE (OUTPATIENT)
Dept: FAMILY MEDICINE | Facility: CLINIC | Age: 69
End: 2019-08-21

## 2019-08-21 PROBLEM — Z86.2 HISTORY OF ANEMIA: Status: RESOLVED | Noted: 2017-01-04 | Resolved: 2019-08-21

## 2019-08-21 NOTE — PROGRESS NOTES
Subjective:       Patient ID: Cindy Escobedo is a 69 y.o. female.    Chief Complaint: Follow-up    HPI   The patient is a 69-year-old who is here today for her 9 month follow-up proved she is here today with her .  Today we discussed the followin)  Dementia.  Her dementia is progression.  She has good days and bad days.  Her  never leaves her alone.  She has trouble accomplishing simple tasks such as following up the water pitcher as she will put ice in the pitcher but forget to put the water in the pitcher.  She sees her neurologist every 6 months.  She is currently taking Namenda and Aricept prescribed by the neurologist and Celexa and Ritalin prescribed by her psychiatrist.  Of note, her psychiatrist is closing his practice and has suggested she get the Celexa and Ritalin either from the neurologist or from her PCP.  2) hypertension.  Her blood pressure is good today at 116/72.  She is currently taking Diovan HCTZ and atenolol (primarily for palpitations).  She has not felt as if her blood pressure has been high or low  3) GERD.  She has no acid reflux symptoms or swallowing issues.  She is currently taking Protonix and Zantac.  We did discuss trying to the Protonix to see how she does without it and she would be interested in trying this.      4) hyperlipidemia.  We did discuss her recent cholesterol panel.  Her recent total cholesterol was 236 (up from 166) and her LDL was 166 (up from 98).  She is currently only taking the fibricor and not the Livalo.  They are really not certain how the Livalo got stopped.    Of note, she does continue to follow with her cardiologist.  Her cardiologist has ordered a CT of the neck in March to evaluate her vertebral artery aneurysm CAD.  She is not currently taking an aspirin but would be willing to do so    Review of Systems   Constitutional: Negative for appetite change, chills, diaphoresis, fatigue, fever and unexpected weight change.   HENT: Negative  for congestion, ear pain, postnasal drip, rhinorrhea, sinus pressure, sneezing, sore throat and trouble swallowing.    Eyes: Negative for pain, discharge and visual disturbance.   Respiratory: Negative for cough, chest tightness, shortness of breath and wheezing.    Cardiovascular: Negative for chest pain, palpitations and leg swelling.   Gastrointestinal: Negative for abdominal distention, abdominal pain, blood in stool, constipation, diarrhea, nausea and vomiting.   Skin: Negative for rash.       Objective:      Physical Exam   Constitutional: She is oriented to person, place, and time. She appears well-developed and well-nourished. No distress.   She is a poor historian   HENT:   Head: Normocephalic and atraumatic.   Right Ear: Hearing, tympanic membrane, external ear and ear canal normal.   Left Ear: Hearing, tympanic membrane, external ear and ear canal normal.   Nose: Nose normal.   Mouth/Throat: Oropharynx is clear and moist and mucous membranes are normal. No oral lesions. No oropharyngeal exudate, posterior oropharyngeal edema or posterior oropharyngeal erythema.   Eyes: Pupils are equal, round, and reactive to light. Conjunctivae, EOM and lids are normal. No scleral icterus.   Neck: Normal range of motion. Neck supple. Carotid bruit is not present. No thyroid mass and no thyromegaly present.   Cardiovascular: Normal rate, regular rhythm and normal heart sounds.  No extrasystoles are present. PMI is not displaced. Exam reveals no gallop.   No murmur heard.  Pulmonary/Chest: Effort normal and breath sounds normal. No accessory muscle usage. No respiratory distress.   Clear to auscultation bilaterally.   Abdominal: Soft. Normal appearance and bowel sounds are normal. She exhibits no abdominal bruit. There is no hepatosplenomegaly. There is no tenderness. There is no rebound.   Lymphadenopathy:        Head (right side): No submental and no submandibular adenopathy present.        Head (left side): No submental  and no submandibular adenopathy present.        Right cervical: No superficial cervical, no deep cervical and no posterior cervical adenopathy present.       Left cervical: No superficial cervical, no deep cervical and no posterior cervical adenopathy present.        Right: No supraclavicular adenopathy present.        Left: No supraclavicular adenopathy present.   Neurological: She is alert and oriented to person, place, and time.   Skin: Skin is warm, dry and intact.   Psychiatric: She has a normal mood and affect. Cognition and memory are impaired.     Blood pressure 116/72, pulse (!) 51, temperature 98.2 °F (36.8 °C), temperature source Oral, resp. rate 16, height 5' (1.524 m), weight 66.5 kg (146 lb 9.7 oz), SpO2 96 %.Body mass index is 28.63 kg/m².            A/P:  1) dementia.  Progressing.  Follow up with Neurology and Psychiatry as planned.  I would prefer her neurologist to prescribe the Celexa and the Ritalin but if they need me to do this I will   2)   Hypertension.  Well controlled.  Continue current medication  3)  GERD.  Asymptomatic.  We are going stop the Protonix and see how she does without it.  She will continue with Zantac.  If she has recurrent symptoms, resume the Protonix   3)  Hyperlipidemia.  Uncontrolled.  Continue with fibricor.  We will resume Livalo.  We will check labs to include fasting lipid profile and CPK of 3 months.  If she has any trouble resuming Livalo, she will let me know  5) carotid artery disease and vertebral artery aneurysm.  Asymptomatic.  We will see with the upcoming CTA scan shows    6) history of TIA.  I did encourage her to start taking enteric-coated baby aspirin every day which she will try to do   7)  History of fatty liver disease.  Asymptomatic.  Recent LFTs are normal we will continue to monitor this      As long as she does well, I will see her back in 9 months unless I start prescribing the Ritalin in which case I will see her every 3 months

## 2019-10-09 DIAGNOSIS — K21.9 GASTROESOPHAGEAL REFLUX DISEASE, ESOPHAGITIS PRESENCE NOT SPECIFIED: ICD-10-CM

## 2019-11-19 ENCOUNTER — PATIENT MESSAGE (OUTPATIENT)
Dept: FAMILY MEDICINE | Facility: CLINIC | Age: 69
End: 2019-11-19

## 2019-11-19 ENCOUNTER — HOSPITAL ENCOUNTER (OUTPATIENT)
Dept: RADIOLOGY | Facility: HOSPITAL | Age: 69
Discharge: HOME | End: 2019-11-19
Attending: FAMILY MEDICINE
Payer: MEDICARE

## 2019-11-19 DIAGNOSIS — Z78.0 POST-MENOPAUSAL: ICD-10-CM

## 2019-11-19 PROCEDURE — 77080 DXA BONE DENSITY AXIAL: CPT | Mod: 26,,, | Performed by: RADIOLOGY

## 2019-11-19 PROCEDURE — 77080 DXA BONE DENSITY AXIAL: CPT | Mod: TC,PO

## 2019-11-19 PROCEDURE — 77080 DEXA BONE DENSITY SPINE HIP: ICD-10-PCS | Mod: 26,,, | Performed by: RADIOLOGY

## 2019-11-22 ENCOUNTER — PATIENT MESSAGE (OUTPATIENT)
Dept: FAMILY MEDICINE | Facility: CLINIC | Age: 69
End: 2019-11-22

## 2019-11-22 NOTE — LETTER
November 25, 2019    Cindy Escobedo  [address]  [facility] [address]             [address]  [facility] [address]  Phone: [phone]  Fax: [phone] Dear Mrs. Escobedo:    Our office has been trying to contact you to discuss your test results. Please call us at [phone] or use your [facility] aleksandra. Dr Garzon has also sent you 2 messages through your aleksandra.      Sincerely,        Lanie Connelly LPN

## 2019-11-27 ENCOUNTER — LAB VISIT (OUTPATIENT)
Dept: LAB | Facility: HOSPITAL | Age: 69
End: 2019-11-27
Attending: FAMILY MEDICINE
Payer: MEDICARE

## 2019-11-27 DIAGNOSIS — E78.5 HYPERLIPIDEMIA, UNSPECIFIED HYPERLIPIDEMIA TYPE: ICD-10-CM

## 2019-11-27 LAB
CHOLEST SERPL-MCNC: 180 MG/DL (ref 120–199)
CHOLEST/HDLC SERPL: 4.5 {RATIO} (ref 2–5)
HDLC SERPL-MCNC: 40 MG/DL (ref 40–75)
HDLC SERPL: 22.2 % (ref 20–50)
LDLC SERPL CALC-MCNC: 112 MG/DL (ref 63–159)
NONHDLC SERPL-MCNC: 140 MG/DL
TRIGL SERPL-MCNC: 140 MG/DL (ref 30–150)

## 2019-11-27 PROCEDURE — 36415 COLL VENOUS BLD VENIPUNCTURE: CPT | Mod: PO

## 2019-11-27 PROCEDURE — 80061 LIPID PANEL: CPT

## 2019-11-28 ENCOUNTER — PATIENT MESSAGE (OUTPATIENT)
Dept: FAMILY MEDICINE | Facility: CLINIC | Age: 69
End: 2019-11-28

## 2019-12-02 RX ORDER — PITAVASTATIN CALCIUM 2.09 MG/1
2 TABLET, FILM COATED ORAL NIGHTLY
Qty: 90 TABLET | Refills: 3 | Status: SHIPPED | OUTPATIENT
Start: 2019-12-02 | End: 2021-03-07

## 2020-01-20 DIAGNOSIS — K21.9 GASTROESOPHAGEAL REFLUX DISEASE, ESOPHAGITIS PRESENCE NOT SPECIFIED: ICD-10-CM

## 2020-02-13 ENCOUNTER — OFFICE VISIT (OUTPATIENT)
Dept: GASTROENTEROLOGY | Facility: CLINIC | Age: 70
End: 2020-02-13
Payer: MEDICARE

## 2020-02-13 VITALS
BODY MASS INDEX: 27.96 KG/M2 | HEART RATE: 60 BPM | HEIGHT: 60 IN | SYSTOLIC BLOOD PRESSURE: 145 MMHG | DIASTOLIC BLOOD PRESSURE: 86 MMHG | RESPIRATION RATE: 18 BRPM | WEIGHT: 142.44 LBS

## 2020-02-13 DIAGNOSIS — K57.92 DIVERTICULITIS: Primary | ICD-10-CM

## 2020-02-13 DIAGNOSIS — R93.3 ABNORMAL CT SCAN, COLON: ICD-10-CM

## 2020-02-13 DIAGNOSIS — K21.9 GASTROESOPHAGEAL REFLUX DISEASE, ESOPHAGITIS PRESENCE NOT SPECIFIED: ICD-10-CM

## 2020-02-13 PROCEDURE — 99999 PR PBB SHADOW E&M-EST. PATIENT-LVL III: ICD-10-PCS | Mod: PBBFAC,,, | Performed by: INTERNAL MEDICINE

## 2020-02-13 PROCEDURE — 99214 OFFICE O/P EST MOD 30 MIN: CPT | Mod: S$PBB,,, | Performed by: INTERNAL MEDICINE

## 2020-02-13 PROCEDURE — 99214 PR OFFICE/OUTPT VISIT, EST, LEVL IV, 30-39 MIN: ICD-10-PCS | Mod: S$PBB,,, | Performed by: INTERNAL MEDICINE

## 2020-02-13 PROCEDURE — 99213 OFFICE O/P EST LOW 20 MIN: CPT | Mod: PBBFAC,PO | Performed by: INTERNAL MEDICINE

## 2020-02-13 PROCEDURE — 99999 PR PBB SHADOW E&M-EST. PATIENT-LVL III: CPT | Mod: PBBFAC,,, | Performed by: INTERNAL MEDICINE

## 2020-02-13 RX ORDER — BENZONATATE 200 MG/1
200 CAPSULE ORAL 3 TIMES DAILY PRN
Qty: 50 CAPSULE | Refills: 1 | Status: SHIPPED | OUTPATIENT
Start: 2020-02-13 | End: 2020-04-21 | Stop reason: SDUPTHER

## 2020-02-13 RX ORDER — MONTELUKAST SODIUM 10 MG/1
10 TABLET ORAL NIGHTLY
COMMUNITY
End: 2020-05-27

## 2020-02-13 RX ORDER — BENZONATATE 200 MG/1
200 CAPSULE ORAL 3 TIMES DAILY PRN
COMMUNITY
End: 2020-02-13 | Stop reason: SDUPTHER

## 2020-02-13 NOTE — PROGRESS NOTES
"Subjective:       Patient ID: Cindy Escobedo is a 69 y.o. female.    Chief Complaint: No chief complaint on file.    This is my first encounter with Ms. Escobedo, who is here with her , Mr. Henderson (who provides most of the history, and answers), for f/u after an ER visit for presumed diverticulitis.  She was treated with abx, and he reports she took them all.  She is now doing welld, feels fine.  No abdo pain.  No fever.  BMs are back to normal.   She has a hx of GERD.  She ran out of her ranitidine, but is doing OK without it (just on pantoprazole).    Seeing the neurologist, Dr. Nassar, for her progressive aphasia and dementia (used to see Dr. Seay?).  And in the past, was seen here by Dr. Ernst (see his note from 3/7/2018, for some of that hx:  "She is here with her daughter; she informed me that in addition to the aneurysm she has also been followed by Dr. Rossi for a dx of primary progressive aphasia; unfortunately I do not have any records regarding this issue.  Dx with PPA 4 years ago; unclear if she had detailed neuropsychological testing.  Needs help with medications, unable to do simple tasks at home such as making a cup of coffee; not driving.  Unable to write her name, difficult to have conversation, hard time getting words or her ideas out.       First started to notice forgetfulness years ago 4 1/2 years ago; was driving, [place] on the [place] the wrong [place].  Gradually progressive process.  Language is variable; some days seems fine, some days struggles.  Her daughter has not noticed any significant personality changes; she does struggle more to perform complex tasks e.g. Remembering how to bake cakes for the family or when she needs to perform functions requiring several steps. Does not recall specifically being on any medication for this. "        See ED note of 12/26/2019:  Abdominal Pain   c/o LLQ abdominal pain since yesterday. Denies n,v,d,fever,constipation, urinary " symptoms.  69-year-old female presents to ED with complaints of LLQ abdominal pain, x1 day.  She denies any associated symptoms, she denies nausea/vomiting/diarrhea, dysuria, fever, constipation.  She reports last BM. yesterday.  She states 3/10 pain, She also reports similar pain in the past, and is concerned she may have a bowel obstruction.  She denies any treatment prior to arrival.    CT Scan 12/26/2019  No dilated loops of bowel suggestive of high-grade obstruction are seen.  Colonic diverticulosis is seen.  There appears to be thickening of the sigmoid colon and distal descending colon with pericolonic fat stranding and trace adjacent free fluid.  The findings are suggestive of acute diverticulitis.  Please correlate with patient's clinical and laboratory findings with follow-up to resolution.  No free air is visualized.  Scattered subcentimeter in short axis dimension mesenteric and retroperitoneal lymph nodes are noted.  Intra-abdominal and pelvic atherosclerosis is seen.  The appendix is not visualized.  Degenerative changes affect the visualized spine.  Impression  1. Colonic diverticulosis is seen.  There appears to be thickening of the distal descending colon and sigmoid colon with pericolonic inflammatory changes, concerning for acute diverticulitis.  Please correlate with patient's clinical and laboratory findings.  Follow-up to resolution is recommended.  2. The gallbladder is nonvisualized.  This may be related to severe gallbladder decompression or cholecystectomy.  No obvious cholecystectomy clips are visualized.  Please correlate with surgical history.  The common bile duct is dilated, measuring approximately 9 mm.  This may be compensatory if there has been cholecystectomy.  Please correlate with patient's biliary function studies to exclude obstruction with further assessment as indicated.  3. The liver demonstrates diffuse decreased attenuation which may reflect fatty infiltration.  4.  Additional findings and details as above.  Electronically signed by: Diaz Mathur MD  Date: 12/26/2019      See last Colonoscopy 3/9/2015  Impression:  - Diverticulosis in the sigmoid colon and in the descending colon.                       - Internal hemorrhoids.                       - The examination was otherwise normal.                       - No specimens collected.  Recommendation:      - Repeat colonoscopy in 10 years for screening purposes, or PRN.                       - Discharge patient to home (ambulatory).  Wicho Rico MD  3/9/2015     Review of Systems   Constitutional: Negative for activity change, appetite change, fatigue, fever and unexpected weight change.   HENT: Negative.  Negative for dental problem, drooling, mouth sores, sore throat, trouble swallowing and voice change.    Eyes: Negative.    Respiratory: Negative.  Negative for cough, choking, shortness of breath, wheezing and stridor.    Cardiovascular: Negative.  Negative for chest pain and leg swelling.   Gastrointestinal: Negative for abdominal distention, abdominal pain (LLQ pain resolved.), anal bleeding, blood in stool, constipation, diarrhea, nausea, rectal pain and vomiting.   Genitourinary: Negative.    Musculoskeletal: Negative.  Negative for arthralgias, joint swelling, myalgias and neck stiffness.   Skin: Negative.  Negative for color change and rash.   Neurological: Negative.  Negative for weakness.   Hematological: Negative for adenopathy. Does not bruise/bleed easily.   Psychiatric/Behavioral: Positive for confusion and decreased concentration. Negative for agitation, behavioral problems and dysphoric mood.       Objective:      Physical Exam   Constitutional: She is oriented to person, place, and time. She appears well-developed and well-nourished. No distress.   HENT:   Head: Normocephalic.   Nose: Nose normal.   Mouth/Throat: Oropharynx is clear and moist. No oropharyngeal exudate.   Eyes: Conjunctivae are normal.  No scleral icterus.   Neck: Neck supple. No tracheal deviation present. No thyromegaly present.   Cardiovascular: Normal rate, regular rhythm, normal heart sounds and intact distal pulses. Exam reveals no gallop.   No murmur heard.  Pulmonary/Chest: Effort normal and breath sounds normal. She has no wheezes. She has no rales.   Abdominal: Soft. Bowel sounds are normal. She exhibits no distension and no mass. There is no tenderness. There is no guarding.   Flat, with normal bowel sounds.  Soft.  Nontender.  No masses, no organomegaly.   Musculoskeletal: Normal range of motion.   Lymphadenopathy:     She has no cervical adenopathy.   Neurological: She is alert and oriented to person, place, and time.   Skin: Skin is warm and dry. No rash noted. No erythema.   Psychiatric: She has a normal mood and affect. Her behavior is normal.       Assessment:         Diverticulitis    Gastroesophageal reflux disease, esophagitis presence not specified    Abnormal CT scan, colon    Other orders  -     benzonatate (TESSALON) 200 MG capsule; Take 1 capsule (200 mg total) by mouth 3 (three) times daily as needed for Cough.  Dispense: 50 capsule; Refill: 1        Plan:        1. D/C ranitidine.  Just take the pantoprazole for now.   2. At some point, she deserves to have a colonoscopy to eval the CT report.  But will leave the timing of that up to her , since she is asymptomatic for now.

## 2020-02-20 ENCOUNTER — HOSPITAL ENCOUNTER (OUTPATIENT)
Dept: RADIOLOGY | Facility: HOSPITAL | Age: 70
Discharge: HOME | End: 2020-02-20
Attending: FAMILY MEDICINE
Payer: MEDICARE

## 2020-02-20 DIAGNOSIS — Z12.31 ENCOUNTER FOR SCREENING MAMMOGRAM FOR MALIGNANT NEOPLASM OF BREAST: ICD-10-CM

## 2020-02-20 PROCEDURE — 77063 MAMMO DIGITAL SCREENING BILAT WITH TOMOSYNTHESIS_CAD: ICD-10-PCS | Mod: 26,,, | Performed by: RADIOLOGY

## 2020-02-20 PROCEDURE — 77067 SCR MAMMO BI INCL CAD: CPT | Mod: 26,,, | Performed by: RADIOLOGY

## 2020-02-20 PROCEDURE — 77067 MAMMO DIGITAL SCREENING BILAT WITH TOMOSYNTHESIS_CAD: ICD-10-PCS | Mod: 26,,, | Performed by: RADIOLOGY

## 2020-02-20 PROCEDURE — 77067 SCR MAMMO BI INCL CAD: CPT | Mod: TC,PO

## 2020-02-20 PROCEDURE — 77063 BREAST TOMOSYNTHESIS BI: CPT | Mod: 26,,, | Performed by: RADIOLOGY

## 2020-02-21 ENCOUNTER — PATIENT MESSAGE (OUTPATIENT)
Dept: FAMILY MEDICINE | Facility: CLINIC | Age: 70
End: 2020-02-21

## 2020-03-17 RX ORDER — ATENOLOL 25 MG/1
TABLET ORAL
Qty: 180 TABLET | Refills: 4 | Status: SHIPPED | OUTPATIENT
Start: 2020-03-17 | End: 2020-09-04 | Stop reason: SDUPTHER

## 2020-03-19 ENCOUNTER — TELEPHONE (OUTPATIENT)
Dept: CARDIOLOGY | Facility: CLINIC | Age: 70
End: 2020-03-19

## 2020-03-19 NOTE — TELEPHONE ENCOUNTER
Spoke with patient's spouse explained that it would be a case to case for spouse to come into visit. Patient's spouse understood.

## 2020-03-19 NOTE — TELEPHONE ENCOUNTER
----- Message from Jerry Kirk sent at 3/19/2020  2:13 PM CDT -----  Type: Needs Medical Advice    Who Called:  Santiago Escobedo (Spouse)    Best Call Back Number: [phone]  Additional Information: Caller states that he would like a callback regarding arranging for him to accompany the patient during her appointments on 03/30

## 2020-03-30 ENCOUNTER — TELEPHONE (OUTPATIENT)
Dept: GASTROENTEROLOGY | Facility: CLINIC | Age: 70
End: 2020-03-30

## 2020-03-30 NOTE — TELEPHONE ENCOUNTER
Attempted to r/s procedure. Pt's  stated that pt would like to cancel procedure due to COVID-19 concerns. Pt's  stated that pt may callback to r/s.

## 2020-04-02 ENCOUNTER — TELEPHONE (OUTPATIENT)
Dept: GASTROENTEROLOGY | Facility: CLINIC | Age: 70
End: 2020-04-02

## 2020-04-02 NOTE — TELEPHONE ENCOUNTER
----- Message from Vikki Feliciano sent at 4/2/2020 10:33 AM CDT -----  Type: Calling to cancel colonoscopy    Who Called:   (Santiago)  Best Call Back Number: [phone]   Additional Information:  calling to cancel patient's colonoscopy scheduled for today/please call back to confirm.

## 2020-04-02 NOTE — TELEPHONE ENCOUNTER
Spoke with pt  and advised that wifes procedure was cancelled on 3/30.  verbalized understanding and stated he did not want to r/s wife at this time.

## 2020-05-05 ENCOUNTER — PATIENT MESSAGE (OUTPATIENT)
Dept: ADMINISTRATIVE | Facility: HOSPITAL | Age: 70
End: 2020-05-05

## 2020-05-27 ENCOUNTER — OFFICE VISIT (OUTPATIENT)
Dept: FAMILY MEDICINE | Facility: CLINIC | Age: 70
End: 2020-05-27
Payer: MEDICARE

## 2020-05-27 VITALS
WEIGHT: 145.38 LBS | BODY MASS INDEX: 28.54 KG/M2 | TEMPERATURE: 97 F | OXYGEN SATURATION: 96 % | SYSTOLIC BLOOD PRESSURE: 132 MMHG | DIASTOLIC BLOOD PRESSURE: 76 MMHG | HEART RATE: 56 BPM | RESPIRATION RATE: 18 BRPM | HEIGHT: 60 IN

## 2020-05-27 DIAGNOSIS — R79.89 ABNORMAL THYROID STIMULATING HORMONE LEVEL: ICD-10-CM

## 2020-05-27 DIAGNOSIS — F33.1 MAJOR DEPRESSIVE DISORDER, RECURRENT, MODERATE: ICD-10-CM

## 2020-05-27 DIAGNOSIS — R73.03 PREDIABETES: ICD-10-CM

## 2020-05-27 DIAGNOSIS — I77.9 CAROTID ARTERY DISEASE, UNSPECIFIED LATERALITY, UNSPECIFIED TYPE: ICD-10-CM

## 2020-05-27 DIAGNOSIS — F03.90 DEMENTIA WITHOUT BEHAVIORAL DISTURBANCE, UNSPECIFIED DEMENTIA TYPE: Primary | ICD-10-CM

## 2020-05-27 PROCEDURE — 99214 OFFICE O/P EST MOD 30 MIN: CPT | Mod: S$GLB,,, | Performed by: FAMILY MEDICINE

## 2020-05-27 PROCEDURE — 99214 PR OFFICE/OUTPT VISIT, EST, LEVL IV, 30-39 MIN: ICD-10-PCS | Mod: S$GLB,,, | Performed by: FAMILY MEDICINE

## 2020-05-27 RX ORDER — SERTRALINE HYDROCHLORIDE 25 MG/1
25 TABLET, FILM COATED ORAL DAILY
COMMUNITY
Start: 2020-05-05 | End: 2020-10-08

## 2020-05-28 PROBLEM — F33.1 MAJOR DEPRESSIVE DISORDER, RECURRENT, MODERATE: Status: ACTIVE | Noted: 2020-05-28

## 2020-05-28 NOTE — PROGRESS NOTES
Subjective:       Patient ID: Cindy Escobedo is a 70 y.o. female.    Chief Complaint: Follow-up    HPI   The patient is a 70-year-old with dementia who is here today with her  for her 9 month follow-up.  Overall, she is doing fairly well considering her dementia.      Regarding her dementia, she does continue to follow with her neurologist Dr. Rossi for her dementia.  She is taking Aricept and Namenda consistently.  Her memory is declining and her memory scores are going down .  Her  continues to be her sole caregiver and so far is able to manage her care.  She does need help with all ADLs    Regarding her depression, she is doing well with the Zoloft.  She does not have any more crying spells or symptoms of depression.  Without the Zoloft, she had been crying every day but her crying has stopped with the Zoloft    Since I have seen her last, she has developed an occasional tremor in her right arm.  Her   has been noticing this at rest.  It is not impacting her much.  Given his Parkinson's disease, he is very observant of her tremor and will be discussing this with her neurologist at her upcoming appointment    She recently saw the pulmonologist due to a chronic cough.  This is particularly problematic at night.  She has been prescribed Tessalon Perles for this cough    Regarding hypertension, her blood pressure today is good at 132/76.  She is taking her Diovan HCTZ well    Regarding hyperlipidemia, she is doing well with the Livalo and the fenofibrate.  She does have upcoming labs for her cardiologist scheduled    Review of Systems   Constitutional: Negative for appetite change, chills, diaphoresis, fatigue, fever and unexpected weight change.   HENT: Negative for congestion, ear pain, postnasal drip, rhinorrhea, sinus pressure, sneezing, sore throat and trouble swallowing.    Eyes: Negative for pain, discharge and visual disturbance.   Respiratory: Negative for cough, chest tightness,  shortness of breath and wheezing.    Cardiovascular: Negative for chest pain, palpitations and leg swelling.   Gastrointestinal: Negative for abdominal distention, abdominal pain, blood in stool, constipation, diarrhea, nausea and vomiting.   Skin: Negative for rash.   Neurological: Positive for tremors.        Per HPI       Objective:      Physical Exam   Constitutional: She is oriented to person, place, and time. She appears well-developed and well-nourished. No distress.   HENT:   Head: Normocephalic and atraumatic.   Right Ear: Hearing, tympanic membrane, external ear and ear canal normal.   Left Ear: Hearing, tympanic membrane, external ear and ear canal normal.   Nose: Nose normal.   Mouth/Throat: Oropharynx is clear and moist and mucous membranes are normal. No oral lesions. No oropharyngeal exudate, posterior oropharyngeal edema or posterior oropharyngeal erythema.   Eyes: Pupils are equal, round, and reactive to light. Conjunctivae, EOM and lids are normal. No scleral icterus.   Neck: Normal range of motion. Neck supple. Carotid bruit is not present. No thyroid mass and no thyromegaly present.   Cardiovascular: Normal rate, regular rhythm and normal heart sounds.  No extrasystoles are present. PMI is not displaced. Exam reveals no gallop.   No murmur heard.  Pulmonary/Chest: Effort normal and breath sounds normal. No accessory muscle usage. No respiratory distress.   Clear to auscultation bilaterally.   Abdominal: Soft. Normal appearance and bowel sounds are normal. She exhibits no abdominal bruit. There is no hepatosplenomegaly. There is no tenderness. There is no rebound.   Lymphadenopathy:        Head (right side): No submental and no submandibular adenopathy present.        Head (left side): No submental and no submandibular adenopathy present.        Right cervical: No superficial cervical, no deep cervical and no posterior cervical adenopathy present.       Left cervical: No superficial cervical, no  deep cervical and no posterior cervical adenopathy present.        Right: No supraclavicular adenopathy present.        Left: No supraclavicular adenopathy present.   Neurological: She is alert and oriented to person, place, and time.   Mild resting tremor in RUE.   Skin: Skin is warm, dry and intact.   Psychiatric: She has a normal mood and affect.     Blood pressure 132/76, pulse (!) 56, temperature 97 °F (36.1 °C), temperature source Oral, resp. rate 18, height 5' (1.524 m), weight 65.9 kg (145 lb 6.3 oz), SpO2 96 %.Body mass index is 28.4 kg/m².            A/P:  1) dementia.  Progressive.  Follow-up with neurology as planned and continue with medication under their direction  2) depression.  Well controlled.  Continue with Zoloft.  If she develops any new or worsening symptoms, they will let me know  3) resting tremor.  New.     follow-up with neurology to address further.  This appears to me to be a benign essential tremor at this time  4) chronic cough.  Continue with Tessalon Perles.  if she worsens, they will let know   5)  Hypertension.  Well controlled.  Continue with Diovan HCT  6)  Hyperlipidemia.  Previously well controlled.  We will check fasting lipid profile with her cardiologist.  Continue current medication  7)  pre diabetes.  Asymptomatic.  We will check an A1c    8) history of abnormal thyroid function tests.  Status unknown.  We will check a TSH with her upcoming labs   9)  Carotid artery disease and vertebral aneurysm.  Asymptomatic.  Follow up with Cardiology for CT as planned    As long as she does well, I will see her back in 9 months.  She will follow up with all of her specialist as planned

## 2020-06-08 ENCOUNTER — HOSPITAL ENCOUNTER (OUTPATIENT)
Dept: RADIOLOGY | Facility: HOSPITAL | Age: 70
Discharge: HOME | End: 2020-06-08
Attending: INTERNAL MEDICINE
Payer: MEDICARE

## 2020-06-08 ENCOUNTER — CLINICAL SUPPORT (OUTPATIENT)
Dept: CARDIOLOGY | Facility: CLINIC | Age: 70
End: 2020-06-08
Attending: INTERNAL MEDICINE
Payer: MEDICARE

## 2020-06-08 VITALS
WEIGHT: 145.31 LBS | BODY MASS INDEX: 28.53 KG/M2 | DIASTOLIC BLOOD PRESSURE: 90 MMHG | HEART RATE: 49 BPM | SYSTOLIC BLOOD PRESSURE: 140 MMHG | HEIGHT: 60 IN

## 2020-06-08 DIAGNOSIS — R09.89 BRUIT: ICD-10-CM

## 2020-06-08 DIAGNOSIS — E78.2 MIXED HYPERLIPIDEMIA: ICD-10-CM

## 2020-06-08 DIAGNOSIS — I10 ESSENTIAL HYPERTENSION: ICD-10-CM

## 2020-06-08 DIAGNOSIS — I65.21 ASYMPTOMATIC STENOSIS OF RIGHT CAROTID ARTERY: ICD-10-CM

## 2020-06-08 DIAGNOSIS — R00.2 PALPITATIONS: ICD-10-CM

## 2020-06-08 DIAGNOSIS — I72.6 VERTEBRAL ARTERY ANEURYSM: ICD-10-CM

## 2020-06-08 PROCEDURE — 93306 TTE W/DOPPLER COMPLETE: CPT | Mod: PBBFAC,PO | Performed by: INTERNAL MEDICINE

## 2020-06-08 PROCEDURE — 70498 CT ANGIOGRAPHY NECK: CPT | Mod: TC,PO

## 2020-06-08 PROCEDURE — 70498 CT ANGIOGRAPHY NECK: CPT | Mod: 26,,, | Performed by: RADIOLOGY

## 2020-06-08 PROCEDURE — 99212 OFFICE O/P EST SF 10 MIN: CPT | Mod: PBBFAC,25,PO

## 2020-06-08 PROCEDURE — 93306 TRANSTHORACIC ECHO (TTE) COMPLETE (CUPID ONLY): ICD-10-PCS | Mod: 26,S$PBB,, | Performed by: INTERNAL MEDICINE

## 2020-06-08 PROCEDURE — 99999 PR PBB SHADOW E&M-EST. PATIENT-LVL II: CPT | Mod: PBBFAC,,,

## 2020-06-08 PROCEDURE — 25500020 PHARM REV CODE 255: Mod: PO | Performed by: INTERNAL MEDICINE

## 2020-06-08 PROCEDURE — 99999 PR PBB SHADOW E&M-EST. PATIENT-LVL II: ICD-10-PCS | Mod: PBBFAC,,,

## 2020-06-08 PROCEDURE — 70498 CTA NECK: ICD-10-PCS | Mod: 26,,, | Performed by: RADIOLOGY

## 2020-06-08 RX ADMIN — IOHEXOL 80 ML: 350 INJECTION, SOLUTION INTRAVENOUS at 09:06

## 2020-06-09 ENCOUNTER — PATIENT MESSAGE (OUTPATIENT)
Dept: FAMILY MEDICINE | Facility: CLINIC | Age: 70
End: 2020-06-09

## 2020-06-09 DIAGNOSIS — R94.6 ABNORMAL THYROID FUNCTION TEST: Primary | ICD-10-CM

## 2020-06-09 LAB
ASCENDING AORTA: 2.65 CM
AV INDEX (PROSTH): 0.63
AV MEAN GRADIENT: 3 MMHG
AV PEAK GRADIENT: 5 MMHG
AV VALVE AREA: 1.89 CM2
AV VELOCITY RATIO: 0.68
BSA FOR ECHO PROCEDURE: 1.67 M2
CV ECHO LV RWT: 0.45 CM
DOP CALC AO PEAK VEL: 1.17 M/S
DOP CALC AO VTI: 32.56 CM
DOP CALC LVOT AREA: 3 CM2
DOP CALC LVOT DIAMETER: 1.96 CM
DOP CALC LVOT PEAK VEL: 0.79 M/S
DOP CALC LVOT STROKE VOLUME: 61.64 CM3
DOP CALCLVOT PEAK VEL VTI: 20.44 CM
E WAVE DECELERATION TIME: 216.84 MSEC
E/A RATIO: 0.89
E/E' RATIO: 11.69 M/S
ECHO LV POSTERIOR WALL: 0.88 CM (ref 0.6–1.1)
FRACTIONAL SHORTENING: 37 % (ref 28–44)
INTERVENTRICULAR SEPTUM: 0.92 CM (ref 0.6–1.1)
IVRT: 128.45 MSEC
LA MAJOR: 4.88 CM
LA MINOR: 4.36 CM
LA WIDTH: 3.43 CM
LEFT ATRIUM SIZE: 3.64 CM
LEFT ATRIUM VOLUME INDEX: 30 ML/M2
LEFT ATRIUM VOLUME: 48.87 CM3
LEFT INTERNAL DIMENSION IN SYSTOLE: 2.48 CM (ref 2.1–4)
LEFT VENTRICLE DIASTOLIC VOLUME INDEX: 41.29 ML/M2
LEFT VENTRICLE DIASTOLIC VOLUME: 67.29 ML
LEFT VENTRICLE MASS INDEX: 65 G/M2
LEFT VENTRICLE SYSTOLIC VOLUME INDEX: 13.4 ML/M2
LEFT VENTRICLE SYSTOLIC VOLUME: 21.79 ML
LEFT VENTRICULAR INTERNAL DIMENSION IN DIASTOLE: 3.93 CM (ref 3.5–6)
LEFT VENTRICULAR MASS: 106.63 G
LV LATERAL E/E' RATIO: 10.86 M/S
LV SEPTAL E/E' RATIO: 12.67 M/S
MV PEAK A VEL: 0.85 M/S
MV PEAK E VEL: 0.76 M/S
PISA TR MAX VEL: 2.79 M/S
PULM VEIN S/D RATIO: 1.53
PV PEAK D VEL: 0.55 M/S
PV PEAK S VEL: 0.84 M/S
RA MAJOR: 4.56 CM
RA PRESSURE: 8 MMHG
RA WIDTH: 3.22 CM
RIGHT VENTRICULAR END-DIASTOLIC DIMENSION: 3.3 CM
SINUS: 2.98 CM
STJ: 2.29 CM
TDI LATERAL: 0.07 M/S
TDI SEPTAL: 0.06 M/S
TDI: 0.07 M/S
TR MAX PG: 31 MMHG
TRICUSPID ANNULAR PLANE SYSTOLIC EXCURSION: 2.08 CM
TV REST PULMONARY ARTERY PRESSURE: 39 MMHG

## 2020-06-12 ENCOUNTER — TELEPHONE (OUTPATIENT)
Dept: CARDIOLOGY | Facility: CLINIC | Age: 70
End: 2020-06-12

## 2020-06-12 DIAGNOSIS — R93.89 ABNORMAL COMPUTED TOMOGRAPHY ANGIOGRAPHY (CTA) OF NECK: Primary | ICD-10-CM

## 2020-06-12 NOTE — TELEPHONE ENCOUNTER
----- Message from Josh Ferrara MD sent at 6/12/2020  8:04 AM CDT -----  Please call the patient regarding her abnormal result.  Refer to Dr. Becca hooker

## 2020-06-16 ENCOUNTER — TELEPHONE (OUTPATIENT)
Dept: VASCULAR SURGERY | Facility: CLINIC | Age: 70
End: 2020-06-16

## 2020-06-16 ENCOUNTER — TELEPHONE (OUTPATIENT)
Dept: CARDIOLOGY | Facility: CLINIC | Age: 70
End: 2020-06-16

## 2020-06-16 NOTE — TELEPHONE ENCOUNTER
----- Message from Rodrigo Moeller sent at 6/16/2020  8:14 AM CDT -----  Type:  Patient Returning Call    Who Called:  pt  Who Left Message for Patient:  Kalyn  Does the patient know what this is regarding?:  results  Best Call Back Number:  [phone]  Additional Information:

## 2020-06-16 NOTE — TELEPHONE ENCOUNTER
----- Message from Bharti Simpson sent at 6/16/2020  8:36 AM CDT -----  Patient's , Santiago, is calling to schedule an appt.  Cindy has been referred by Dr Ferrara.  Please call him at [phone].  Thank you!

## 2020-06-17 NOTE — TELEPHONE ENCOUNTER
Left message on voicemail informing appointment with Dr Hudson scheduled on Thursday, 7/23 @ 1030, request call back if needed.

## 2020-07-06 ENCOUNTER — PATIENT MESSAGE (OUTPATIENT)
Dept: CARDIOLOGY | Facility: CLINIC | Age: 70
End: 2020-07-06

## 2020-07-06 DIAGNOSIS — R00.2 PALPITATIONS: Chronic | ICD-10-CM

## 2020-07-06 DIAGNOSIS — I72.6 VERTEBRAL ARTERY ANEURYSM: ICD-10-CM

## 2020-07-06 DIAGNOSIS — I65.23 BILATERAL CAROTID ARTERY STENOSIS: Chronic | ICD-10-CM

## 2020-07-06 DIAGNOSIS — E78.5 DYSLIPIDEMIA: Chronic | ICD-10-CM

## 2020-07-06 NOTE — TELEPHONE ENCOUNTER
Please advise. Fenofibric is not covered by insurance. What would you like to change too, directions, strength?

## 2020-07-08 RX ORDER — FENOFIBRIC ACID 45 MG/1
CAPSULE, DELAYED RELEASE ORAL
Qty: 90 CAPSULE | Refills: 4 | Status: ON HOLD | OUTPATIENT
Start: 2020-07-08 | End: 2020-08-17 | Stop reason: CLARIF

## 2020-07-08 RX ORDER — FENOFIBRIC ACID 45 MG/1
CAPSULE, DELAYED RELEASE ORAL
Qty: 90 CAPSULE | Refills: 4 | Status: SHIPPED | OUTPATIENT
Start: 2020-07-08 | End: 2020-11-10

## 2020-07-17 DIAGNOSIS — Z71.89 COMPLEX CARE COORDINATION: ICD-10-CM

## 2020-07-23 ENCOUNTER — INITIAL CONSULT (OUTPATIENT)
Dept: VASCULAR SURGERY | Facility: CLINIC | Age: 70
End: 2020-07-23
Payer: MEDICARE

## 2020-07-23 VITALS
SYSTOLIC BLOOD PRESSURE: 142 MMHG | HEART RATE: 47 BPM | WEIGHT: 145.5 LBS | BODY MASS INDEX: 28.57 KG/M2 | HEIGHT: 60 IN | DIASTOLIC BLOOD PRESSURE: 75 MMHG

## 2020-07-23 DIAGNOSIS — I65.21 STENOSIS OF RIGHT CAROTID ARTERY: Primary | ICD-10-CM

## 2020-07-23 PROCEDURE — 99999 PR PBB SHADOW E&M-EST. PATIENT-LVL IV: ICD-10-PCS | Mod: PBBFAC,,, | Performed by: THORACIC SURGERY (CARDIOTHORACIC VASCULAR SURGERY)

## 2020-07-23 PROCEDURE — 99205 OFFICE O/P NEW HI 60 MIN: CPT | Mod: S$PBB,,, | Performed by: THORACIC SURGERY (CARDIOTHORACIC VASCULAR SURGERY)

## 2020-07-23 PROCEDURE — 99214 OFFICE O/P EST MOD 30 MIN: CPT | Mod: PBBFAC,PO | Performed by: THORACIC SURGERY (CARDIOTHORACIC VASCULAR SURGERY)

## 2020-07-23 PROCEDURE — 99999 PR PBB SHADOW E&M-EST. PATIENT-LVL IV: CPT | Mod: PBBFAC,,, | Performed by: THORACIC SURGERY (CARDIOTHORACIC VASCULAR SURGERY)

## 2020-07-23 PROCEDURE — 99205 PR OFFICE/OUTPT VISIT, NEW, LEVL V, 60-74 MIN: ICD-10-PCS | Mod: S$PBB,,, | Performed by: THORACIC SURGERY (CARDIOTHORACIC VASCULAR SURGERY)

## 2020-07-23 NOTE — PROGRESS NOTES
OFFICE VISIT      This patient was referred to me by Dr. Ferrara.    HISTORY OF PRESENT ILLNESS:  The patient is a 70-year-old female with primary progressive aphasia who was found to have an 80-90% stenosis of the right internal carotid artery.  She denies any amaurosis fugax, transient ischemic attack symptoms, or strokes.            Past Medical History:   Diagnosis Date    Allergy     Carotid arterial disease     mild in 2012    Dementia     Depression     Diverticular disease     noted on cscope    Fatty liver     H/O esophagogastroduodenoscopy     last 8/17    HEARING LOSS     History of esophagitis     noted on 8/17 EGD    History of gastritis     noted on EGD    HTN (hypertension)     Hyperlipidemia     LFT elevation     US 1/18 with fatty liver    FARA (obstructive sleep apnea)     no CPAP    Osteoarthritis     elevated CRP    Palpitations 5/7/2012    05/10/2001 all coronaries normal;      Prediabetes     Primary progressive aphasia     S/P colonoscopy     3/19 repeat 3/29    Schatzki's ring     s/p dilation 8/17    TIA (transient ischemic attack)     Valvular heart disease     mod [place] 4/16    Vertebral artery aneurysm 3/28/2013    7/2012 angio 4.5 millimeter diameter fusiform left V4 segment aneurysm Minimal intracranial atherosclerosis        Past Surgical History:   Procedure Laterality Date    APPENDECTOMY      BLADDER SUSPENSION  1993    BREAST BIOPSY Left     neg    CARPAL TUNNEL RELEASE      right    CERVICAL FUSION      seen Dr. Raymond patricia removed-RUL      CHOLECYSTECTOMY      DILATION AND CURETTAGE OF UTERUS      esophageal hernia repair      ESOPHAGUS SURGERY      HYSTERECTOMY      due to excessive bleeding    JOINT REPLACEMENT      partial left knee    KNEE ARTHROSCOPY      left    SINUS SURGERY      TONSILLECTOMY, ADENOIDECTOMY, BILATERAL MYRINGOTOMY AND TUBES      UVULOPALATOPHARYNGOPLASTY AND SEPTOPLASTY      VAGINAL DELIVERY      times 2       Review  "of patient's allergies indicates:   Allergen Reactions    Codeine Anaphylaxis    Ace inhibitors      Other reaction(s): cough    Cat hair extract      Other reaction(s): Sneezing  Other reaction(s): Rash    Epinephrine      Other reaction(s): Tachycardia  Other reaction(s): Tachycardia    Hydrocodone-acetaminophen      Other reaction(s): Anaphylaxis  Other reaction(s): Anaphylaxis    Lipitor [atorvastatin]      Myalgias      Oxycodone hcl-oxycodone-asa      Other reaction(s): Agitation  Other reaction(s): Agitation    Oxytetracycline      Other reaction(s): Fainting  Other reaction(s): Fainting    Rosuvastatin      Other reaction(s): Muscle pain  Other reaction(s): Muscle pain    Sulfamethoxazole-trimethoprim      Other reaction(s): "Cracked lips"  Other reaction(s): "Cracked lips"       Current Outpatient Medications   Medication Sig Dispense Refill    aspirin (ECOTRIN) 81 MG EC tablet Take 81 mg by mouth once daily.      atenoloL (TENORMIN) 25 MG tablet TAKE 1 TABLET(25 MG) BY MOUTH TWICE DAILY 180 tablet 4    benzonatate (TESSALON) 200 MG capsule Take 1 capsule (200 mg total) by mouth 3 (three) times daily as needed for Cough. 50 capsule 1    cetirizine (ZYRTEC) 10 mg TbDL Take by mouth.      donepezil (ARICEPT) 10 MG tablet TK 1 T PO HS  3    fenofibric acid (FIBRICOR) 45 mg CpDR TAKE 1 CAPSULE(45 MG) BY MOUTH EVERY DAY 90 capsule 4    fenofibric acid (FIBRICOR) 45 mg CpDR TAKE 1 CAPSULE(45 MG) BY MOUTH EVERY DAY 90 capsule 4    memantine (NAMENDA) 10 MG Tab TK 1 T PO BID  3    multivitamin (THERAGRAN) per tablet Every day      pitavastatin calcium (LIVALO) 2 mg Tab tablet Take 1 tablet (2 mg total) by mouth every evening. 90 tablet 3    sertraline (ZOLOFT) 25 MG tablet       valsartan-hydrochlorothiazide (DIOVAN-HCT) 320-12.5 mg per tablet Take 1 tablet by mouth once daily.      zolpidem (AMBIEN) 5 MG Tab TK 1 T PO Q NIGHT AT BEDTIME PRN FOR SLEEP  1     No current facility-administered " medications for this visit.        Family History   Problem Relation Age of Onset    COPD Mother     Stroke Father         cerebral hemorrhage    Breast cancer Maternal Grandmother     Cancer Maternal Grandmother     Glaucoma Sister     Uveitis Sister     Cataracts Sister     Uveitis Sister     Amblyopia Neg Hx     Blindness Neg Hx     Diabetes Neg Hx     Hypertension Neg Hx     Macular degeneration Neg Hx     Retinal detachment Neg Hx     Strabismus Neg Hx     Thyroid disease Neg Hx        Social History     Socioeconomic History    Marital status:      Spouse name: Not on file    Number of children: 2    Years of education: Not on file    Highest education level: Not on file   Occupational History     Employer: [employer]   Social Needs    Financial resource strain: Not on file    Food insecurity     Worry: Not on file     Inability: Not on file    Transportation needs     Medical: Not on file     Non-medical: Not on file   Tobacco Use    Smoking status: Former Smoker     Quit date: 1974     Years since quittin.0    Smokeless tobacco: Never Used    Tobacco comment: quit in high school   Substance and Sexual Activity    Alcohol use: No    Drug use: No    Sexual activity: Yes     Partners: Male     Birth control/protection: Surgical   Lifestyle    Physical activity     Days per week: Not on file     Minutes per session: Not on file    Stress: Not on file   Relationships    Social connections     Talks on phone: Not on file     Gets together: Not on file     Attends Mandaen service: Not on file     Active member of club or organization: Not on file     Attends meetings of clubs or organizations: Not on file     Relationship status: Not on file   Other Topics Concern    Not on file   Social History Narrative    Not on file       REVIEW OF SYSTEMS:  Patient has problems with memory and has generalized weakness.  She is very unsure of herself when walking and her gait is  somewhat unsteady.  Her  states that she was diagnosed with primary progressive aphasia at least 2 years ago and apparently her symptoms are worsening.  She cries and gets upset very easily.  All other systems are reviewed and are negative.        PHYSICAL EXAM:  Physical Exam  Vitals signs and nursing note reviewed.   Constitutional:       General: She is not in acute distress.     Appearance: She is not ill-appearing, toxic-appearing or diaphoretic.   HENT:      Head: Normocephalic and atraumatic.   Eyes:      General: No scleral icterus.     Extraocular Movements: Extraocular movements intact.      Conjunctiva/sclera: Conjunctivae normal.      Pupils: Pupils are equal, round, and reactive to light.   Neck:      Musculoskeletal: Normal range of motion and neck supple. No neck rigidity.   Cardiovascular:      Rate and Rhythm: Normal rate and regular rhythm.   Pulmonary:      Effort: Pulmonary effort is normal. No respiratory distress.      Breath sounds: No rales.   Chest:      Chest wall: No tenderness.   Abdominal:      Palpations: Abdomen is soft.      Tenderness: There is no abdominal tenderness. There is no rebound.   Musculoskeletal: Normal range of motion.         General: No swelling.      Right lower leg: No edema.      Left lower leg: No edema.   Skin:     General: Skin is warm.      Coloration: Skin is not jaundiced or pale.   Neurological:      General: No focal deficit present.      Mental Status: She is alert and oriented to person, place, and time.      Cranial Nerves: No cranial nerve deficit.      Motor: Weakness present.      Gait: Gait abnormal.      Comments: Generalized weakness with no lateralizing neurologic deficits   Psychiatric:      Comments: Cries very easily.         Vitals:    07/23/20 1043   BP: (!) 142/75   Pulse: (!) 47     CTA showed 80-90% stenosis of the right internal carotid artery, and 6 mm aneurysm of the V4 segment of the left vertebral artery    IMPRESSION:  1.   Asymptomatic high-grade stenosis of the right internal carotid artery.  2.  Primary progressive aphasia  3.  Vertebral artery aneurysm  4.  Hypertension      RECOMMENDATIONS:  I think the patient will benefit from right carotid endarterectomy.  Risks and benefits were discussed with the patient and her .  Risks include but are not limited to stroke, death, bleeding, infection, numbness the neck face and ear lobe, injury to the cranial nerves causing problems with swallowing and speech postoperatively etc.  They voiced understanding and wished to proceed.        Narciso Hudson MD

## 2020-07-27 ENCOUNTER — OFFICE VISIT (OUTPATIENT)
Dept: FAMILY MEDICINE | Facility: CLINIC | Age: 70
End: 2020-07-27
Payer: MEDICARE

## 2020-07-27 VITALS
RESPIRATION RATE: 18 BRPM | DIASTOLIC BLOOD PRESSURE: 74 MMHG | WEIGHT: 147.69 LBS | HEIGHT: 60 IN | HEART RATE: 51 BPM | SYSTOLIC BLOOD PRESSURE: 118 MMHG | OXYGEN SATURATION: 99 % | TEMPERATURE: 97 F | BODY MASS INDEX: 28.99 KG/M2

## 2020-07-27 DIAGNOSIS — S39.012A STRAIN OF LUMBAR REGION, INITIAL ENCOUNTER: Primary | ICD-10-CM

## 2020-07-27 PROCEDURE — 99213 OFFICE O/P EST LOW 20 MIN: CPT | Mod: S$GLB,,, | Performed by: INTERNAL MEDICINE

## 2020-07-27 PROCEDURE — 99213 PR OFFICE/OUTPT VISIT, EST, LEVL III, 20-29 MIN: ICD-10-PCS | Mod: S$GLB,,, | Performed by: INTERNAL MEDICINE

## 2020-07-27 RX ORDER — TIZANIDINE 4 MG/1
TABLET ORAL
Qty: 20 TABLET | Refills: 0 | Status: SHIPPED | OUTPATIENT
Start: 2020-07-27 | End: 2020-11-05

## 2020-07-27 RX ORDER — NAPROXEN 500 MG/1
500 TABLET ORAL 2 TIMES DAILY
Qty: 20 TABLET | Refills: 1 | Status: ON HOLD | OUTPATIENT
Start: 2020-07-27 | End: 2020-08-17 | Stop reason: CLARIF

## 2020-07-27 NOTE — PATIENT INSTRUCTIONS
When she goes for CEA she will need to be off naprosyn for one week.     Use muscle relaxer tizanidine 4 mg 1/2 pill to 1 pill nightly for muscles    Use naprosyn 500 mg twice a day for pain (not a narcotic)

## 2020-07-27 NOTE — PROGRESS NOTES
Subjective:       Patient ID: Cindy Escobedo is a 70 y.o. female.    Medication List with Changes/Refills   New Medications    NAPROXEN (NAPROSYN) 500 MG TABLET    Take 1 tablet (500 mg total) by mouth 2 (two) times a day.    TIZANIDINE (ZANAFLEX) 4 MG TABLET    1/2 to 1 tablet qhs PRN back pain   Current Medications    ASPIRIN (ECOTRIN) 81 MG EC TABLET    Take 81 mg by mouth once daily.    ATENOLOL (TENORMIN) 25 MG TABLET    TAKE 1 TABLET(25 MG) BY MOUTH TWICE DAILY    BENZONATATE (TESSALON) 200 MG CAPSULE    Take 1 capsule (200 mg total) by mouth 3 (three) times daily as needed for Cough.    CETIRIZINE (ZYRTEC) 10 MG TBDL    Take by mouth.    DONEPEZIL (ARICEPT) 10 MG TABLET    TK 1 T PO HS    FENOFIBRIC ACID (FIBRICOR) 45 MG CPDR    TAKE 1 CAPSULE(45 MG) BY MOUTH EVERY DAY    FENOFIBRIC ACID (FIBRICOR) 45 MG CPDR    TAKE 1 CAPSULE(45 MG) BY MOUTH EVERY DAY    MEMANTINE (NAMENDA) 10 MG TAB    TK 1 T PO BID    MULTIVITAMIN (THERAGRAN) PER TABLET    Every day    PITAVASTATIN CALCIUM (LIVALO) 2 MG TAB TABLET    Take 1 tablet (2 mg total) by mouth every evening.    SERTRALINE (ZOLOFT) 25 MG TABLET        VALSARTAN-HYDROCHLOROTHIAZIDE (DIOVAN-HCT) 320-12.5 MG PER TABLET    Take 1 tablet by mouth once daily.    ZOLPIDEM (AMBIEN) 5 MG TAB    TK 1 T PO Q NIGHT AT BEDTIME PRN FOR SLEEP       Chief Complaint: Back Pain (lower back pain that started last week-new occurrence never had this before, hard time getting in and out of chair; Been using Tylenol and heating pad. )    She has primary progressive aphasia and dementia. She is with her  today.     She reports one week of left sided back pain.  feels it started after she has fallen asleep slumped in her recliner.  She has pain on the left side that is worse with movement. No radiation down her back or into her legs.  No weakness. Worse with movement and changing positions. Better with walking.  She is using tylenol for pain without much relief.  No  injuries or falls.        Review of Systems   Constitutional: Negative for activity change, appetite change, chills, fatigue and fever.   HENT: Negative for congestion, ear discharge, ear pain, mouth sores, postnasal drip, rhinorrhea, sinus pressure and sore throat.    Eyes: Negative for pain, discharge and redness.   Respiratory: Negative for cough, chest tightness, shortness of breath and wheezing.    Gastrointestinal: Negative for abdominal pain, constipation, diarrhea, nausea and vomiting.   Genitourinary: Negative for dysuria.   Musculoskeletal: Positive for back pain. Negative for arthralgias and neck stiffness.   Skin: Negative for rash.   Neurological: Negative for headaches.   Hematological: Negative for adenopathy.       Objective:      Vitals:    07/27/20 1027   BP: 118/74   BP Location: Left arm   Patient Position: Sitting   BP Method: Medium (Manual)   Pulse: (!) 51   Resp: 18   Temp: 97.3 °F (36.3 °C)   TempSrc: Temporal   SpO2: 99%   Weight: 67 kg (147 lb 11.3 oz)   Height: 5' (1.524 m)     Body mass index is 28.85 kg/m².  Physical Exam    General appearance: No acute distress, cooperative  Neck: FROM, soft, supple  Lymph: no anterior or posterior cervical adenopathy  Heart::  Regular rate and rhythm, no murmur  Lung: Clear to ascultation bilaterally, no wheezing, no rales, no rhonchi, no distress  Abdomen: Soft, nontender, no distention, no hepatosplenomegaly, bowel sounds normal, no guarding, no rebound, no peritoneal signs  Skin: no rashes, no lesions  Extremities: no edema, no cyanosis  Neuro: no focal abnormalities, strength 5/5 b/l UE and LE, 2+ DTRs b/l UE and LE  Peripheral pulses: 2+ pedal pulses bilaterally, good perfusion and color  Musculoskeletal: FROM, good strenth, positive tenderness of left paraspinal muscles on palpation  Joint: normal appearance, no swelling, no warmth, no deformity in all joints    Assessment:       1. Strain of lumbar region, initial encounter        Plan:        Low back strain  I feel her pain is due to poor sleep position and subsequent low back strain on the left.  Reassurance and supportive care.   does not feel she could do PT so will treat with tizanidine 1/2 to 1 tablet qhs for couple days and naprosyn bid for one week. If pain continues then will encourage a trial of PT.   -     tiZANidine (ZANAFLEX) 4 MG tablet; 1/2 to 1 tablet qhs PRN back pain  Dispense: 20 tablet; Refill: 0  -     naproxen (NAPROSYN) 500 MG tablet; Take 1 tablet (500 mg total) by mouth 2 (two) times a day.  Dispense: 20 tablet; Refill: 1    Follow up if symptoms worsen or fail to improve.

## 2020-07-29 ENCOUNTER — TELEPHONE (OUTPATIENT)
Dept: VASCULAR SURGERY | Facility: CLINIC | Age: 70
End: 2020-07-29

## 2020-07-29 DIAGNOSIS — I65.21 STENOSIS OF RIGHT CAROTID ARTERY: Primary | ICD-10-CM

## 2020-07-29 DIAGNOSIS — Z79.01 LONG TERM (CURRENT) USE OF ANTICOAGULANTS: ICD-10-CM

## 2020-07-29 RX ORDER — LIDOCAINE HYDROCHLORIDE 10 MG/ML
1 INJECTION, SOLUTION EPIDURAL; INFILTRATION; INTRACAUDAL; PERINEURAL ONCE
Status: CANCELLED | OUTPATIENT
Start: 2020-07-29 | End: 2020-07-29

## 2020-07-29 NOTE — TELEPHONE ENCOUNTER
Spoke to , surgery confirmed on Monday, 8/17/20, @ [facility].  Also informed of pre-op appointment on 8/14,  10 am @ the [facility], and DARIEN drive through screening appointment on 8/14, 0935 @ the [facility].  Voices understanding and is agreeable.

## 2020-07-29 NOTE — TELEPHONE ENCOUNTER
----- Message from Mohsen Edmondson sent at 7/29/2020  2:47 PM CDT -----  Contact: pt's  Santiago  Type:  Patient Returning Call    Who Called:  Santiago  Who Left Message for Patient:  not sure  Does the patient know what this is regarding?:  yes, scheduling procedure.   Best Call Back Number:  [phone]  Additional Information:

## 2020-08-14 ENCOUNTER — LAB VISIT (OUTPATIENT)
Dept: FAMILY MEDICINE | Facility: CLINIC | Age: 70
End: 2020-08-14
Payer: MEDICARE

## 2020-08-14 DIAGNOSIS — I65.21 STENOSIS OF RIGHT CAROTID ARTERY: ICD-10-CM

## 2020-08-14 PROCEDURE — U0003 INFECTIOUS AGENT DETECTION BY NUCLEIC ACID (DNA OR RNA); SEVERE ACUTE RESPIRATORY SYNDROME CORONAVIRUS 2 (SARS-COV-2) (CORONAVIRUS DISEASE [COVID-19]), AMPLIFIED PROBE TECHNIQUE, MAKING USE OF HIGH THROUGHPUT TECHNOLOGIES AS DESCRIBED BY CMS-2020-01-R: HCPCS

## 2020-08-15 LAB — SARS-COV-2 RNA RESP QL NAA+PROBE: NOT DETECTED

## 2020-08-17 PROBLEM — I65.21 STENOSIS OF RIGHT CAROTID ARTERY: Status: ACTIVE | Noted: 2020-08-17

## 2020-08-18 PROBLEM — Z98.890 S/P CAROTID ENDARTERECTOMY: Status: ACTIVE | Noted: 2020-08-18

## 2020-08-18 PROBLEM — I65.21 STENOSIS OF RIGHT CAROTID ARTERY: Status: RESOLVED | Noted: 2020-08-17 | Resolved: 2020-08-18

## 2020-08-26 RX ORDER — VALSARTAN AND HYDROCHLOROTHIAZIDE 320; 12.5 MG/1; MG/1
TABLET, FILM COATED ORAL
Qty: 90 TABLET | Refills: 4 | Status: SHIPPED | OUTPATIENT
Start: 2020-08-26 | End: 2021-09-09 | Stop reason: SDUPTHER

## 2020-09-03 ENCOUNTER — OFFICE VISIT (OUTPATIENT)
Dept: VASCULAR SURGERY | Facility: CLINIC | Age: 70
End: 2020-09-03
Payer: MEDICARE

## 2020-09-03 ENCOUNTER — PATIENT OUTREACH (OUTPATIENT)
Dept: ADMINISTRATIVE | Facility: OTHER | Age: 70
End: 2020-09-03

## 2020-09-03 VITALS
DIASTOLIC BLOOD PRESSURE: 91 MMHG | SYSTOLIC BLOOD PRESSURE: 141 MMHG | HEART RATE: 56 BPM | HEIGHT: 61 IN | BODY MASS INDEX: 27.06 KG/M2 | WEIGHT: 143.31 LBS

## 2020-09-03 DIAGNOSIS — I65.21 STENOSIS OF RIGHT CAROTID ARTERY: Primary | ICD-10-CM

## 2020-09-03 PROCEDURE — 99024 PR POST-OP FOLLOW-UP VISIT: ICD-10-PCS | Mod: POP,,, | Performed by: THORACIC SURGERY (CARDIOTHORACIC VASCULAR SURGERY)

## 2020-09-03 PROCEDURE — 99999 PR PBB SHADOW E&M-EST. PATIENT-LVL IV: CPT | Mod: PBBFAC,,, | Performed by: THORACIC SURGERY (CARDIOTHORACIC VASCULAR SURGERY)

## 2020-09-03 PROCEDURE — 99024 POSTOP FOLLOW-UP VISIT: CPT | Mod: POP,,, | Performed by: THORACIC SURGERY (CARDIOTHORACIC VASCULAR SURGERY)

## 2020-09-03 PROCEDURE — 99999 PR PBB SHADOW E&M-EST. PATIENT-LVL IV: ICD-10-PCS | Mod: PBBFAC,,, | Performed by: THORACIC SURGERY (CARDIOTHORACIC VASCULAR SURGERY)

## 2020-09-03 PROCEDURE — 99214 OFFICE O/P EST MOD 30 MIN: CPT | Mod: PBBFAC,PO | Performed by: THORACIC SURGERY (CARDIOTHORACIC VASCULAR SURGERY)

## 2020-09-03 NOTE — PROGRESS NOTES
This is a 70-year-old female who underwent right carotid endarterectomy about 2 weeks ago.  She is doing well from her carotid endarterectomy.  The incision has healed well.  Tongue is midline and overall neurologic function is at baseline.  We will do carotid ultrasound in 1 year.

## 2020-09-03 NOTE — PROGRESS NOTES
LINKS immunization registry updated  Care Everywhere updated  Health Maintenance updated  Chart reviewed for overdue Proactive [facility] Encounters (BILLY) health maintenance testing (CRS, Breast Ca, Diabetic Eye Exam)   Orders entered:N/A

## 2020-09-04 ENCOUNTER — OFFICE VISIT (OUTPATIENT)
Dept: CARDIOLOGY | Facility: CLINIC | Age: 70
End: 2020-09-04
Payer: MEDICARE

## 2020-09-04 VITALS
BODY MASS INDEX: 28.99 KG/M2 | DIASTOLIC BLOOD PRESSURE: 75 MMHG | WEIGHT: 147.69 LBS | HEART RATE: 55 BPM | SYSTOLIC BLOOD PRESSURE: 118 MMHG | HEIGHT: 60 IN

## 2020-09-04 DIAGNOSIS — I10 ESSENTIAL HYPERTENSION: Primary | Chronic | ICD-10-CM

## 2020-09-04 DIAGNOSIS — E78.5 DYSLIPIDEMIA: Chronic | ICD-10-CM

## 2020-09-04 DIAGNOSIS — I72.6 VERTEBRAL ARTERY ANEURYSM: ICD-10-CM

## 2020-09-04 DIAGNOSIS — Z98.890 S/P CAROTID ENDARTERECTOMY: ICD-10-CM

## 2020-09-04 DIAGNOSIS — I65.21 ASYMPTOMATIC STENOSIS OF RIGHT CAROTID ARTERY: Chronic | ICD-10-CM

## 2020-09-04 DIAGNOSIS — E78.2 MIXED HYPERLIPIDEMIA: ICD-10-CM

## 2020-09-04 DIAGNOSIS — I67.81 ACUTE CEREBROVASCULAR INSUFFICIENCY: ICD-10-CM

## 2020-09-04 PROCEDURE — 99213 OFFICE O/P EST LOW 20 MIN: CPT | Mod: PBBFAC,PO | Performed by: INTERNAL MEDICINE

## 2020-09-04 PROCEDURE — 99214 OFFICE O/P EST MOD 30 MIN: CPT | Mod: S$PBB,,, | Performed by: INTERNAL MEDICINE

## 2020-09-04 PROCEDURE — 99999 PR PBB SHADOW E&M-EST. PATIENT-LVL III: ICD-10-PCS | Mod: PBBFAC,,, | Performed by: INTERNAL MEDICINE

## 2020-09-04 PROCEDURE — 99999 PR PBB SHADOW E&M-EST. PATIENT-LVL III: CPT | Mod: PBBFAC,,, | Performed by: INTERNAL MEDICINE

## 2020-09-04 PROCEDURE — 99214 PR OFFICE/OUTPT VISIT, EST, LEVL IV, 30-39 MIN: ICD-10-PCS | Mod: S$PBB,,, | Performed by: INTERNAL MEDICINE

## 2020-09-04 RX ORDER — ATENOLOL 25 MG/1
25 TABLET ORAL NIGHTLY
Qty: 180 TABLET | Refills: 4 | Status: SHIPPED | OUTPATIENT
Start: 2020-09-04 | End: 2021-09-09 | Stop reason: SDUPTHER

## 2020-09-04 RX ORDER — PROMETHAZINE HYDROCHLORIDE 25 MG/1
25 TABLET ORAL EVERY 6 HOURS PRN
COMMUNITY
End: 2022-05-23

## 2020-09-04 NOTE — PROGRESS NOTES
Subjective:    Patient ID:  Cindy Escobedo is a 70 y.o. female who presents for follow-up of Hypertension f/u      HPI  Ms. Escobedo here for follow up of palpitations, DLP, vertebral artery aneurysm/r CEA 8/20.   No angina      Review of Systems   Constitution: Negative for malaise/fatigue.   Eyes: Negative for blurred vision.   Cardiovascular: Negative for chest pain, claudication, cyanosis, dyspnea on exertion, irregular heartbeat, leg swelling, near-syncope, orthopnea, palpitations, paroxysmal nocturnal dyspnea and syncope.   Respiratory: Negative for cough and shortness of breath.    Hematologic/Lymphatic: Does not bruise/bleed easily.   Musculoskeletal: Negative for back pain, falls, joint pain, muscle cramps, muscle weakness and myalgias.   Gastrointestinal: Negative for abdominal pain, change in bowel habit, nausea and vomiting.   Genitourinary: Negative for urgency.   Neurological: Negative for dizziness, focal weakness and light-headedness.       Past Medical History:   Diagnosis Date    Allergy     Anticoagulant long-term use     Carotid arterial disease     mild in 2012    Dementia     Dementia     Depression     Diverticular disease     noted on cscope    Encounter for blood transfusion     Fatty liver     H/O esophagogastroduodenoscopy     last 8/17    HEARING LOSS     History of esophagitis     noted on 8/17 EGD    History of gastritis     noted on EGD    HTN (hypertension)     Hyperlipidemia     LFT elevation     US 1/18 with fatty liver    FARA (obstructive sleep apnea)     no CPAP    Osteoarthritis     elevated CRP    Palpitations 5/7/2012    05/10/2001 all coronaries normal;      Prediabetes     Primary progressive aphasia     S/P colonoscopy     3/19 repeat 3/29    Schatzki's ring     s/p dilation 8/17    TIA (transient ischemic attack)     Valvular heart disease     mod LA 4/16    Vertebral artery aneurysm 3/28/2013    7/2012 angio 4.5 millimeter diameter fusiform left V4  segment aneurysm Minimal intracranial atherosclerosis           Objective:     Vitals:    09/04/20 1355   BP: 118/75   Pulse: (!) 55        Physical Exam   Constitutional: She is oriented to person, place, and time. She appears well-developed and well-nourished.   HENT:   Head: Normocephalic.   Eyes: Conjunctivae are normal.   Neck: Normal range of motion. Neck supple. No JVD present.   Cardiovascular: Normal rate, regular rhythm, normal heart sounds and intact distal pulses.   Pulses:       Carotid pulses are 2+ on the right side and 2+ on the left side.       Radial pulses are 2+ on the right side and 2+ on the left side.        Dorsalis pedis pulses are 2+ on the right side and 2+ on the left side.        Posterior tibial pulses are 2+ on the right side and 2+ on the left side.   Pulmonary/Chest: Effort normal and breath sounds normal.   Abdominal: Soft. Bowel sounds are normal.   Musculoskeletal:         General: No tenderness or edema.   Neurological: She is alert and oriented to person, place, and time. Gait normal.   Skin: Skin is warm, dry and intact. No cyanosis. Nails show no clubbing.   Psychiatric: She has a normal mood and affect. Her speech is normal and behavior is normal. Thought content normal.             ..    Chemistry        Component Value Date/Time     08/18/2020 0610    K 3.2 (L) 08/18/2020 0610     08/18/2020 0610    CO2 25 08/18/2020 0610    BUN 10 08/18/2020 0610    CREATININE 0.72 08/18/2020 0610     (H) 08/18/2020 0610        Component Value Date/Time    CALCIUM 9.3 08/18/2020 0610    ALKPHOS 62 06/08/2020 0655    AST 22 06/08/2020 0655    ALT 29 06/08/2020 0655    BILITOT 0.9 06/08/2020 0655    ESTGFRAFRICA >60 08/18/2020 0610    EGFRNONAA >60 08/18/2020 0610            ..  Lab Results   Component Value Date    CHOL 176 06/08/2020    CHOL 180 11/27/2019    CHOL 236 (H) 08/13/2019     Lab Results   Component Value Date    HDL 41 06/08/2020    HDL 40 11/27/2019    HDL  38 (L) 08/13/2019     Lab Results   Component Value Date    LDLCALC 104.8 06/08/2020    LDLCALC 112.0 11/27/2019    LDLCALC 166.6 (H) 08/13/2019     Lab Results   Component Value Date    TRIG 151 (H) 06/08/2020    TRIG 140 11/27/2019    TRIG 157 (H) 08/13/2019     Lab Results   Component Value Date    CHOLHDL 23.3 06/08/2020    CHOLHDL 22.2 11/27/2019    CHOLHDL 16.1 (L) 08/13/2019     ..  Lab Results   Component Value Date    WBC 9.22 08/17/2020    HGB 12.7 08/17/2020    HCT 37.6 08/17/2020    MCV 92 08/17/2020     08/17/2020       Test(s) Reviewed  I have reviewed the following in detail:  [] Stress test   [] Angiography   [x] Echocardiogram   [x] Labs   [] Other:       Assessment:         ICD-10-CM ICD-9-CM   1. Essential hypertension  I10 401.9   2. Asymptomatic stenosis of right carotid artery  I65.21 433.10   3. Vertebral artery aneurysm  I72.6 442.81   4. Mixed hyperlipidemia  E78.2 272.2   5. Dyslipidemia  E78.5 272.4   6. S/P carotid endarterectomy  Z98.890 V45.89     Problem List Items Addressed This Visit     Asymptomatic stenosis of right carotid artery (Chronic)    Dyslipidemia (Chronic)    Essential hypertension - Primary (Chronic)    Mixed hyperlipidemia    S/P carotid endarterectomy    Vertebral artery aneurysm    Overview     7/2012 angio  4.5 millimeter diameter fusiform left V4 segment aneurysm  Minimal intracranial atherosclerosis                Plan:           Return to clinic 9 months   Low level/low impact aerobic exercise 5x's/wk. Heart healthy diet and risk factor modification.    See labs and med orders.  Neck CTA follow vert aneurysm (6/20 CT- 6 mm aneurysm in the V4 segment of the left vertebral artery).        Portions of this note may have been created with voice recognition software.  Grammatical, syntax and spelling errors may be inevitable.

## 2020-09-10 ENCOUNTER — LAB VISIT (OUTPATIENT)
Dept: LAB | Facility: HOSPITAL | Age: 70
End: 2020-09-10
Attending: FAMILY MEDICINE
Payer: MEDICARE

## 2020-09-10 DIAGNOSIS — R94.6 ABNORMAL THYROID FUNCTION TEST: ICD-10-CM

## 2020-09-10 PROCEDURE — 84443 ASSAY THYROID STIM HORMONE: CPT

## 2020-09-10 PROCEDURE — 36415 COLL VENOUS BLD VENIPUNCTURE: CPT | Mod: PO

## 2020-09-11 LAB — TSH SERPL DL<=0.005 MIU/L-ACNC: 3.12 UIU/ML (ref 0.4–4)

## 2020-09-17 ENCOUNTER — TELEPHONE (OUTPATIENT)
Dept: VASCULAR SURGERY | Facility: CLINIC | Age: 70
End: 2020-09-17

## 2020-09-17 NOTE — TELEPHONE ENCOUNTER
Patient stated that on the incision site near the ear there is a hard bump. I recommended to put her under a shower to soften the area up and debride the scab. Note what was underneath and to give us a call back.  was understanding and said he would let us know how the incision looks.

## 2020-10-07 ENCOUNTER — PATIENT OUTREACH (OUTPATIENT)
Dept: ADMINISTRATIVE | Facility: OTHER | Age: 70
End: 2020-10-07

## 2020-10-07 NOTE — PROGRESS NOTES
LINKS immunization registry not responding  Care Everywhere updated  Health Maintenance updated  Chart reviewed for overdue [facility] Encounters ([facility]) health maintenance testing (CRS, Breast Ca, Diabetic Eye Exam)   Orders entered:N/A

## 2020-10-08 ENCOUNTER — OFFICE VISIT (OUTPATIENT)
Dept: OPTOMETRY | Facility: CLINIC | Age: 70
End: 2020-10-08
Payer: MEDICARE

## 2020-10-08 ENCOUNTER — IMMUNIZATION (OUTPATIENT)
Dept: FAMILY MEDICINE | Facility: CLINIC | Age: 70
End: 2020-10-08
Payer: MEDICARE

## 2020-10-08 DIAGNOSIS — H52.7 REFRACTIVE ERROR: ICD-10-CM

## 2020-10-08 DIAGNOSIS — H25.13 NUCLEAR SCLEROSIS, BILATERAL: Primary | ICD-10-CM

## 2020-10-08 PROCEDURE — 92014 COMPRE OPH EXAM EST PT 1/>: CPT | Mod: S$PBB,,, | Performed by: OPTOMETRIST

## 2020-10-08 PROCEDURE — 92015 PR REFRACTION: ICD-10-PCS | Mod: ,,, | Performed by: OPTOMETRIST

## 2020-10-08 PROCEDURE — 99999 PR PBB SHADOW E&M-EST. PATIENT-LVL III: CPT | Mod: PBBFAC,,, | Performed by: OPTOMETRIST

## 2020-10-08 PROCEDURE — 90694 VACC AIIV4 NO PRSRV 0.5ML IM: CPT | Mod: PBBFAC,PO

## 2020-10-08 PROCEDURE — 92015 DETERMINE REFRACTIVE STATE: CPT | Mod: ,,, | Performed by: OPTOMETRIST

## 2020-10-08 PROCEDURE — G0008 ADMIN INFLUENZA VIRUS VAC: HCPCS | Mod: PBBFAC,PO

## 2020-10-08 PROCEDURE — 99999 PR PBB SHADOW E&M-EST. PATIENT-LVL III: ICD-10-PCS | Mod: PBBFAC,,, | Performed by: OPTOMETRIST

## 2020-10-08 PROCEDURE — 92014 PR EYE EXAM, EST PATIENT,COMPREHESV: ICD-10-PCS | Mod: S$PBB,,, | Performed by: OPTOMETRIST

## 2020-10-08 PROCEDURE — 99213 OFFICE O/P EST LOW 20 MIN: CPT | Mod: PBBFAC,PO | Performed by: OPTOMETRIST

## 2020-10-08 RX ORDER — SERTRALINE HYDROCHLORIDE 50 MG/1
1 TABLET, FILM COATED ORAL DAILY
COMMUNITY
Start: 2020-09-11 | End: 2021-02-26

## 2020-10-08 NOTE — PROGRESS NOTES
HPI     Concerns About Ocular Health      Additional comments: Ocular health exam              Comments     DLS: 5/9/19    Pt states only uses gls to read. Has not noticed any changes but wants   updated rx for gls. No floaters or flashes. No drops.           Last edited by Cheryle Quintana on 10/8/2020  9:27 AM. (History)            Assessment /Plan     For exam results, see Encounter Report.    Nuclear sclerosis, bilateral    Refractive error      1. Educated pt on presence of cataracts and effects on vision. No surgery at this time. Recheck in one year.  2. New Spec Rx given. Different lens options discussed with patient. RTC 1 year full exam.

## 2020-10-21 ENCOUNTER — PES CALL (OUTPATIENT)
Dept: ADMINISTRATIVE | Facility: CLINIC | Age: 70
End: 2020-10-21

## 2020-11-05 ENCOUNTER — TELEPHONE (OUTPATIENT)
Dept: HOME HEALTH SERVICES | Facility: CLINIC | Age: 70
End: 2020-11-05

## 2020-11-05 ENCOUNTER — OFFICE VISIT (OUTPATIENT)
Dept: HOME HEALTH SERVICES | Facility: CLINIC | Age: 70
End: 2020-11-05
Payer: MEDICARE

## 2020-11-05 DIAGNOSIS — M54.2 CERVICALGIA: ICD-10-CM

## 2020-11-05 DIAGNOSIS — R41.3 MEMORY LOSS: ICD-10-CM

## 2020-11-05 DIAGNOSIS — K21.9 GASTROESOPHAGEAL REFLUX DISEASE, UNSPECIFIED WHETHER ESOPHAGITIS PRESENT: ICD-10-CM

## 2020-11-05 DIAGNOSIS — I67.1 CEREBRAL ANEURYSM, NONRUPTURED: ICD-10-CM

## 2020-11-05 DIAGNOSIS — I65.21 ASYMPTOMATIC STENOSIS OF RIGHT CAROTID ARTERY: Chronic | ICD-10-CM

## 2020-11-05 DIAGNOSIS — F02.80 PRIMARY PROGRESSIVE APHASIA: ICD-10-CM

## 2020-11-05 DIAGNOSIS — E78.2 MIXED HYPERLIPIDEMIA: ICD-10-CM

## 2020-11-05 DIAGNOSIS — N18.31 STAGE 3A CHRONIC KIDNEY DISEASE: ICD-10-CM

## 2020-11-05 DIAGNOSIS — G30.9 ALZHEIMER'S DEMENTIA WITHOUT BEHAVIORAL DISTURBANCE, UNSPECIFIED TIMING OF DEMENTIA ONSET: ICD-10-CM

## 2020-11-05 DIAGNOSIS — I70.0 ATHEROSCLEROSIS OF AORTA: ICD-10-CM

## 2020-11-05 DIAGNOSIS — F33.1 MAJOR DEPRESSIVE DISORDER, RECURRENT, MODERATE: Primary | ICD-10-CM

## 2020-11-05 DIAGNOSIS — Z00.00 ENCOUNTER FOR PREVENTIVE HEALTH EXAMINATION: ICD-10-CM

## 2020-11-05 DIAGNOSIS — M47.812 OSTEOARTHRITIS OF CERVICAL SPINE, UNSPECIFIED SPINAL OSTEOARTHRITIS COMPLICATION STATUS: ICD-10-CM

## 2020-11-05 DIAGNOSIS — I10 ESSENTIAL HYPERTENSION: Chronic | ICD-10-CM

## 2020-11-05 DIAGNOSIS — G31.01 PRIMARY PROGRESSIVE APHASIA: ICD-10-CM

## 2020-11-05 DIAGNOSIS — D50.8 OTHER IRON DEFICIENCY ANEMIA: ICD-10-CM

## 2020-11-05 DIAGNOSIS — F02.80 ALZHEIMER'S DEMENTIA WITHOUT BEHAVIORAL DISTURBANCE, UNSPECIFIED TIMING OF DEMENTIA ONSET: ICD-10-CM

## 2020-11-05 PROBLEM — Z98.890 S/P CAROTID ENDARTERECTOMY: Status: RESOLVED | Noted: 2020-08-18 | Resolved: 2020-11-05

## 2020-11-05 PROCEDURE — G0439 PPPS, SUBSEQ VISIT: HCPCS | Mod: S$GLB,,, | Performed by: NURSE PRACTITIONER

## 2020-11-05 PROCEDURE — G0439 PR MEDICARE ANNUAL WELLNESS SUBSEQUENT VISIT: ICD-10-PCS | Mod: S$GLB,,, | Performed by: NURSE PRACTITIONER

## 2020-11-05 RX ORDER — ASPIRIN 81 MG/1
81 TABLET ORAL DAILY
COMMUNITY

## 2020-11-05 NOTE — PROGRESS NOTES
Cindy Escobedo presented for a  Medicare AWV and comprehensive Health Risk Assessment today. The following components were reviewed and updated:    · Medical history  · Family History  · Social history  · Allergies and Current Medications  · Health Risk Assessment  · Health Maintenance  · Care Team     ** See Completed Assessments for Annual Wellness Visit within the encounter summary.**         The following assessments were completed:  · Living Situation  · CAGE  · Depression Screening  · Timed Get Up and Go  · Whisper Test  · Cognitive Function Screening  · Nutrition Screening  · ADL Screening  · PAQ Screening        Vitals:    11/05/20 1103   BP: (!) (P) 146/90   Pulse: (P) 60   Weight: (P) 65.3 kg (144 lb)   Height: (P) 5' (1.524 m)     Body mass index is 28.12 kg/m² (pended).  Physical Exam  Constitutional:       Appearance: Normal appearance.   HENT:      Head: Normocephalic and atraumatic.   Eyes:      Extraocular Movements: Extraocular movements intact.      Pupils: Pupils are equal, round, and reactive to light.   Neck:      Musculoskeletal: Normal range of motion and neck supple.   Cardiovascular:      Rate and Rhythm: Normal rate and regular rhythm.      Pulses: Normal pulses.      Heart sounds: Normal heart sounds.   Pulmonary:      Effort: Pulmonary effort is normal.      Breath sounds: Normal breath sounds.   Neurological:      Mental Status: She is alert.               Diagnoses and health risks identified today and associated recommendations/orders:    1. Encounter for preventive health examination  AWV completed       2. Major depressive disorder, recurrent, moderate  Chronic and stable. Continue current treatment. Follow with PCP.  Stable on zoloft     3. Mixed hyperlipidemia  Chronic and stable. Continue current treatment. Follow with PCP.  On livalo monitoring with labs    4. Essential hypertension  Chronic and stable. Continue current treatment. Follow with PCP.  Monitoring BP at home    5.  Other iron deficiency anemia  Chronic and stable. Continue current treatment. Follow with PCP.  Monitoring with labs    6. Gastroesophageal reflux disease, unspecified whether esophagitis present  Chronic and stable. Continue current treatment. Follow with PCP.  Stable off meds     7. Cervicalgia  Chronic and stable. Continue current treatment. Follow with PCP.      8. Memory loss  Chronic and stable. Continue current treatment. Follow with PCP and neurology    9. Primary progressive aphasia  Chronic and stable. Continue current treatment. Follow with PCP and neurology    10. Alzheimer's dementia without behavioral disturbance, unspecified timing of dementia onset  Chronic and stable. Continue current treatment. Follow with PCP.  On meds - following with neurology     11. Cerebral aneurysm, nonruptured  Chronic and stable. Continue current treatment. Follow with PCP and neurology   Followed with radiology     12. Osteoarthritis of cervical spine, unspecified spinal osteoarthritis complication status  Chronic and stable. Continue current treatment. Follow with PCP.      13. Stage 3a chronic kidney disease  Chronic and stable. Continue current treatment. Follow with PCP.  Monitored with labs    14. Asymptomatic stenosis of right carotid artery  Chronic and stable. Continue current treatment. Follow with PCP and cardiology.      15. Atherosclerosis of aorta  Chronic and stable. Continue current treatment. Follow with PCP.        Provided Cindy with a 5-10 year written screening schedule and personal prevention plan. Recommendations were developed using the USPSTF age appropriate recommendations. Education, counseling, and referrals were provided as needed. After Visit Summary printed and given to patient which includes a list of additional screenings\tests needed.    Fu in1 yr for BRIGETTE Swartz NP    I offered to discuss end of life issues, including information on how to make advance directives  that the patient could use to name someone who would make medical decisions on their behalf if they became too ill to make themselves.    ___Patient declined  _X_Patient is interested, I provided paper work and offered to discuss.

## 2020-11-05 NOTE — TELEPHONE ENCOUNTER
Dr Ferrara -   Please see the following BP report from the patient.          If you would please call the patient and let her know if there are any changes in her meds-   Valsartan/hctz  320/12.5 Qd

## 2020-11-05 NOTE — PATIENT INSTRUCTIONS
Counseling and Referral of Other Preventative  (Italic type indicates deductible and co-insurance are waived)    Patient Name: Cindy Escobedo  Today's Date: 11/5/2020    Health Maintenance       Date Due Completion Date    Mammogram 02/20/2021 2/20/2020    Lipid Panel 06/08/2021 6/8/2020    High Dose Statin 11/05/2021 11/5/2020    DEXA SCAN 11/19/2022 11/19/2019    Colorectal Cancer Screening 03/09/2025 3/9/2015    Override on 2/28/2013: Done (per [employer] claims data)    Override on 4/24/2007: Done (Dr. Osmany Connelly:  Repeat screening recommended in 6 years (entire examined colon appeared normal, per report.))    TETANUS VACCINE 07/06/2026 7/6/2016        No orders of the defined types were placed in this encounter.    The following information is provided to all patients.  This information is to help you find resources for any of the problems found today that may be affecting your health:                Living healthy guide: www.[facility].[place].gov      Understanding Diabetes: www.diabetes.org      Eating healthy: www.cdc.gov/healthyweight      CDC home safety checklist: www.cdc.gov/steadi/patient.html      Agency on Aging: www.goea.[place].gov      Alcoholics anonymous (AA): www.aa.org      Physical Activity: www.chava.nih.gov/tw9dshg      Tobacco use: www.quitwithusla.org

## 2020-11-09 DIAGNOSIS — R00.2 PALPITATIONS: Chronic | ICD-10-CM

## 2020-11-09 DIAGNOSIS — I65.23 BILATERAL CAROTID ARTERY STENOSIS: Chronic | ICD-10-CM

## 2020-11-09 DIAGNOSIS — I72.6 VERTEBRAL ARTERY ANEURYSM: ICD-10-CM

## 2020-11-09 DIAGNOSIS — E78.5 DYSLIPIDEMIA: Chronic | ICD-10-CM

## 2020-11-10 RX ORDER — FENOFIBRIC ACID 45 MG/1
CAPSULE, DELAYED RELEASE ORAL
Qty: 90 CAPSULE | Refills: 4 | Status: SHIPPED | OUTPATIENT
Start: 2020-11-10 | End: 2021-08-09 | Stop reason: SDUPTHER

## 2021-01-05 ENCOUNTER — TELEPHONE (OUTPATIENT)
Dept: FAMILY MEDICINE | Facility: CLINIC | Age: 71
End: 2021-01-05

## 2021-01-05 ENCOUNTER — IMMUNIZATION (OUTPATIENT)
Dept: FAMILY MEDICINE | Facility: CLINIC | Age: 71
End: 2021-01-05
Payer: MEDICARE

## 2021-01-05 DIAGNOSIS — Z23 NEED FOR VACCINATION: ICD-10-CM

## 2021-01-05 PROCEDURE — 91300 COVID-19, MRNA, LNP-S, PF, 30 MCG/0.3 ML DOSE VACCINE: CPT | Mod: PBBFAC,PO

## 2021-01-26 ENCOUNTER — IMMUNIZATION (OUTPATIENT)
Dept: FAMILY MEDICINE | Facility: CLINIC | Age: 71
End: 2021-01-26
Payer: MEDICARE

## 2021-01-26 DIAGNOSIS — Z23 NEED FOR VACCINATION: Primary | ICD-10-CM

## 2021-01-26 PROCEDURE — 91300 COVID-19, MRNA, LNP-S, PF, 30 MCG/0.3 ML DOSE VACCINE: CPT | Mod: PBBFAC,PO

## 2021-01-26 PROCEDURE — 0002A COVID-19, MRNA, LNP-S, PF, 30 MCG/0.3 ML DOSE VACCINE: CPT | Mod: PBBFAC,PO

## 2021-02-23 ENCOUNTER — HOSPITAL ENCOUNTER (OUTPATIENT)
Dept: RADIOLOGY | Facility: HOSPITAL | Age: 71
Discharge: HOME | End: 2021-02-23
Attending: FAMILY MEDICINE
Payer: MEDICARE

## 2021-02-23 DIAGNOSIS — Z12.31 ENCOUNTER FOR SCREENING MAMMOGRAM FOR MALIGNANT NEOPLASM OF BREAST: ICD-10-CM

## 2021-02-23 PROCEDURE — 77063 MAMMO DIGITAL SCREENING BILAT WITH TOMO: ICD-10-PCS | Mod: 26,,, | Performed by: RADIOLOGY

## 2021-02-23 PROCEDURE — 77067 SCR MAMMO BI INCL CAD: CPT | Mod: TC,PO

## 2021-02-23 PROCEDURE — 77067 MAMMO DIGITAL SCREENING BILAT WITH TOMO: ICD-10-PCS | Mod: 26,,, | Performed by: RADIOLOGY

## 2021-02-23 PROCEDURE — 77067 SCR MAMMO BI INCL CAD: CPT | Mod: 26,,, | Performed by: RADIOLOGY

## 2021-02-23 PROCEDURE — 77063 BREAST TOMOSYNTHESIS BI: CPT | Mod: 26,,, | Performed by: RADIOLOGY

## 2021-02-24 ENCOUNTER — PATIENT MESSAGE (OUTPATIENT)
Dept: FAMILY MEDICINE | Facility: CLINIC | Age: 71
End: 2021-02-24

## 2021-02-26 ENCOUNTER — OFFICE VISIT (OUTPATIENT)
Dept: FAMILY MEDICINE | Facility: CLINIC | Age: 71
End: 2021-02-26
Payer: MEDICARE

## 2021-02-26 VITALS
DIASTOLIC BLOOD PRESSURE: 76 MMHG | TEMPERATURE: 98 F | RESPIRATION RATE: 12 BRPM | WEIGHT: 150.38 LBS | OXYGEN SATURATION: 99 % | BODY MASS INDEX: 29.52 KG/M2 | SYSTOLIC BLOOD PRESSURE: 122 MMHG | HEART RATE: 55 BPM | HEIGHT: 60 IN

## 2021-02-26 DIAGNOSIS — I70.0 ATHEROSCLEROSIS OF AORTA: ICD-10-CM

## 2021-02-26 DIAGNOSIS — I10 HYPERTENSION, ESSENTIAL: ICD-10-CM

## 2021-02-26 DIAGNOSIS — F33.1 MAJOR DEPRESSIVE DISORDER, RECURRENT, MODERATE: ICD-10-CM

## 2021-02-26 DIAGNOSIS — E78.5 HYPERLIPIDEMIA, UNSPECIFIED HYPERLIPIDEMIA TYPE: ICD-10-CM

## 2021-02-26 DIAGNOSIS — F03.90 DEMENTIA WITHOUT BEHAVIORAL DISTURBANCE, UNSPECIFIED DEMENTIA TYPE: Primary | ICD-10-CM

## 2021-02-26 DIAGNOSIS — R73.03 PREDIABETES: ICD-10-CM

## 2021-02-26 PROCEDURE — 99214 PR OFFICE/OUTPT VISIT, EST, LEVL IV, 30-39 MIN: ICD-10-PCS | Mod: S$GLB,,, | Performed by: FAMILY MEDICINE

## 2021-02-26 PROCEDURE — 99214 OFFICE O/P EST MOD 30 MIN: CPT | Mod: S$GLB,,, | Performed by: FAMILY MEDICINE

## 2021-02-26 RX ORDER — ONDANSETRON 4 MG/1
4 TABLET, FILM COATED ORAL EVERY 8 HOURS PRN
COMMUNITY
End: 2022-05-23

## 2021-02-26 RX ORDER — SERTRALINE HYDROCHLORIDE 100 MG/1
100 TABLET, FILM COATED ORAL DAILY
Qty: 30 TABLET | Refills: 1 | Status: SHIPPED | OUTPATIENT
Start: 2021-02-26 | End: 2021-07-22

## 2021-02-26 RX ORDER — CHOLECALCIFEROL (VITAMIN D3) 25 MCG
1000 TABLET ORAL DAILY
COMMUNITY

## 2021-04-27 ENCOUNTER — TELEPHONE (OUTPATIENT)
Dept: FAMILY MEDICINE | Facility: CLINIC | Age: 71
End: 2021-04-27

## 2021-08-09 ENCOUNTER — PATIENT MESSAGE (OUTPATIENT)
Dept: CARDIOLOGY | Facility: CLINIC | Age: 71
End: 2021-08-09

## 2021-08-09 DIAGNOSIS — I65.23 BILATERAL CAROTID ARTERY STENOSIS: Chronic | ICD-10-CM

## 2021-08-09 DIAGNOSIS — I72.6 VERTEBRAL ARTERY ANEURYSM: ICD-10-CM

## 2021-08-09 DIAGNOSIS — E78.5 DYSLIPIDEMIA: Chronic | ICD-10-CM

## 2021-08-09 DIAGNOSIS — R00.2 PALPITATIONS: Chronic | ICD-10-CM

## 2021-08-10 RX ORDER — FENOFIBRIC ACID 45 MG/1
45 CAPSULE, DELAYED RELEASE ORAL DAILY
Qty: 90 CAPSULE | Refills: 4 | Status: SHIPPED | OUTPATIENT
Start: 2021-08-10 | End: 2021-09-09 | Stop reason: SDUPTHER

## 2021-08-12 ENCOUNTER — TELEPHONE (OUTPATIENT)
Dept: PHARMACY | Facility: CLINIC | Age: 71
End: 2021-08-12

## 2021-08-17 ENCOUNTER — TELEPHONE (OUTPATIENT)
Dept: VASCULAR SURGERY | Facility: CLINIC | Age: 71
End: 2021-08-17

## 2021-08-17 DIAGNOSIS — I65.21 STENOSIS OF RIGHT CAROTID ARTERY: Primary | ICD-10-CM

## 2021-08-19 ENCOUNTER — TELEPHONE (OUTPATIENT)
Dept: VASCULAR SURGERY | Facility: CLINIC | Age: 71
End: 2021-08-19

## 2021-09-09 ENCOUNTER — OFFICE VISIT (OUTPATIENT)
Dept: CARDIOLOGY | Facility: CLINIC | Age: 71
End: 2021-09-09
Payer: MEDICARE

## 2021-09-09 VITALS
HEIGHT: 60 IN | BODY MASS INDEX: 29.73 KG/M2 | SYSTOLIC BLOOD PRESSURE: 110 MMHG | HEART RATE: 64 BPM | WEIGHT: 151.44 LBS | DIASTOLIC BLOOD PRESSURE: 73 MMHG

## 2021-09-09 DIAGNOSIS — E78.5 DYSLIPIDEMIA: Chronic | ICD-10-CM

## 2021-09-09 DIAGNOSIS — I10 ESSENTIAL HYPERTENSION: Chronic | ICD-10-CM

## 2021-09-09 DIAGNOSIS — G30.9 ALZHEIMER'S DEMENTIA WITHOUT BEHAVIORAL DISTURBANCE, UNSPECIFIED TIMING OF DEMENTIA ONSET: ICD-10-CM

## 2021-09-09 DIAGNOSIS — N18.31 STAGE 3A CHRONIC KIDNEY DISEASE: ICD-10-CM

## 2021-09-09 DIAGNOSIS — Z98.890 S/P CAROTID ENDARTERECTOMY: ICD-10-CM

## 2021-09-09 DIAGNOSIS — E78.2 MIXED HYPERLIPIDEMIA: ICD-10-CM

## 2021-09-09 DIAGNOSIS — Z98.890 H/O CAROTID ENDARTERECTOMY: ICD-10-CM

## 2021-09-09 DIAGNOSIS — I65.23 BILATERAL CAROTID ARTERY STENOSIS: Chronic | ICD-10-CM

## 2021-09-09 DIAGNOSIS — I72.6 VERTEBRAL ARTERY ANEURYSM: Primary | ICD-10-CM

## 2021-09-09 DIAGNOSIS — R00.2 PALPITATIONS: Chronic | ICD-10-CM

## 2021-09-09 DIAGNOSIS — F02.80 ALZHEIMER'S DEMENTIA WITHOUT BEHAVIORAL DISTURBANCE, UNSPECIFIED TIMING OF DEMENTIA ONSET: ICD-10-CM

## 2021-09-09 PROCEDURE — 99999 PR PBB SHADOW E&M-EST. PATIENT-LVL III: CPT | Mod: PBBFAC,,, | Performed by: INTERNAL MEDICINE

## 2021-09-09 PROCEDURE — 99214 PR OFFICE/OUTPT VISIT, EST, LEVL IV, 30-39 MIN: ICD-10-PCS | Mod: S$PBB,,, | Performed by: INTERNAL MEDICINE

## 2021-09-09 PROCEDURE — 99999 PR PBB SHADOW E&M-EST. PATIENT-LVL III: ICD-10-PCS | Mod: PBBFAC,,, | Performed by: INTERNAL MEDICINE

## 2021-09-09 PROCEDURE — 99213 OFFICE O/P EST LOW 20 MIN: CPT | Mod: PBBFAC,PO | Performed by: INTERNAL MEDICINE

## 2021-09-09 PROCEDURE — 99214 OFFICE O/P EST MOD 30 MIN: CPT | Mod: S$PBB,,, | Performed by: INTERNAL MEDICINE

## 2021-09-09 RX ORDER — FENOFIBRIC ACID 45 MG/1
45 CAPSULE, DELAYED RELEASE ORAL DAILY
Qty: 90 CAPSULE | Refills: 4 | Status: SHIPPED | OUTPATIENT
Start: 2021-09-09 | End: 2023-03-13

## 2021-09-09 RX ORDER — PITAVASTATIN CALCIUM 2.09 MG/1
2 TABLET, FILM COATED ORAL NIGHTLY
Qty: 90 TABLET | Refills: 1 | Status: SHIPPED | OUTPATIENT
Start: 2021-09-09 | End: 2022-04-18

## 2021-09-09 RX ORDER — ATENOLOL 25 MG/1
25 TABLET ORAL NIGHTLY
Qty: 180 TABLET | Refills: 4 | Status: SHIPPED | OUTPATIENT
Start: 2021-09-09 | End: 2022-09-19

## 2021-09-09 RX ORDER — VALSARTAN AND HYDROCHLOROTHIAZIDE 320; 12.5 MG/1; MG/1
1 TABLET, FILM COATED ORAL EVERY MORNING
Qty: 90 TABLET | Refills: 4 | Status: SHIPPED | OUTPATIENT
Start: 2021-09-09 | End: 2023-01-05 | Stop reason: SDUPTHER

## 2021-09-20 ENCOUNTER — PES CALL (OUTPATIENT)
Dept: ADMINISTRATIVE | Facility: CLINIC | Age: 71
End: 2021-09-20

## 2021-09-24 ENCOUNTER — TELEPHONE (OUTPATIENT)
Dept: VASCULAR SURGERY | Facility: CLINIC | Age: 71
End: 2021-09-24

## 2021-10-13 ENCOUNTER — OFFICE VISIT (OUTPATIENT)
Dept: OPTOMETRY | Facility: CLINIC | Age: 71
End: 2021-10-13
Payer: MEDICARE

## 2021-10-13 ENCOUNTER — HOSPITAL ENCOUNTER (OUTPATIENT)
Dept: RADIOLOGY | Facility: HOSPITAL | Age: 71
Discharge: HOME | End: 2021-10-13
Attending: THORACIC SURGERY (CARDIOTHORACIC VASCULAR SURGERY)
Payer: MEDICARE

## 2021-10-13 DIAGNOSIS — H25.13 NUCLEAR SCLEROSIS, BILATERAL: Primary | ICD-10-CM

## 2021-10-13 DIAGNOSIS — H52.7 REFRACTIVE ERROR: ICD-10-CM

## 2021-10-13 DIAGNOSIS — I65.21 STENOSIS OF RIGHT CAROTID ARTERY: ICD-10-CM

## 2021-10-13 PROCEDURE — 93880 US CAROTID BILATERAL: ICD-10-PCS | Mod: 26,,, | Performed by: RADIOLOGY

## 2021-10-13 PROCEDURE — 92014 PR EYE EXAM, EST PATIENT,COMPREHESV: ICD-10-PCS | Mod: S$PBB,,, | Performed by: OPTOMETRIST

## 2021-10-13 PROCEDURE — 93880 EXTRACRANIAL BILAT STUDY: CPT | Mod: TC,PO

## 2021-10-13 PROCEDURE — 99999 PR PBB SHADOW E&M-EST. PATIENT-LVL III: CPT | Mod: PBBFAC,,, | Performed by: OPTOMETRIST

## 2021-10-13 PROCEDURE — 93880 EXTRACRANIAL BILAT STUDY: CPT | Mod: 26,,, | Performed by: RADIOLOGY

## 2021-10-13 PROCEDURE — 92014 COMPRE OPH EXAM EST PT 1/>: CPT | Mod: S$PBB,,, | Performed by: OPTOMETRIST

## 2021-10-13 PROCEDURE — 99213 OFFICE O/P EST LOW 20 MIN: CPT | Mod: PBBFAC,PO | Performed by: OPTOMETRIST

## 2021-10-13 PROCEDURE — 99999 PR PBB SHADOW E&M-EST. PATIENT-LVL III: ICD-10-PCS | Mod: PBBFAC,,, | Performed by: OPTOMETRIST

## 2021-11-08 ENCOUNTER — OFFICE VISIT (OUTPATIENT)
Dept: HOME HEALTH SERVICES | Facility: CLINIC | Age: 71
End: 2021-11-08
Payer: MEDICARE

## 2021-11-08 VITALS
HEART RATE: 61 BPM | SYSTOLIC BLOOD PRESSURE: 127 MMHG | OXYGEN SATURATION: 98 % | WEIGHT: 151 LBS | HEIGHT: 60 IN | DIASTOLIC BLOOD PRESSURE: 89 MMHG | BODY MASS INDEX: 29.64 KG/M2

## 2021-11-08 DIAGNOSIS — N18.31 STAGE 3A CHRONIC KIDNEY DISEASE: ICD-10-CM

## 2021-11-08 DIAGNOSIS — F03.91 DEMENTIA WITH BEHAVIORAL DISTURBANCE, UNSPECIFIED DEMENTIA TYPE: ICD-10-CM

## 2021-11-08 DIAGNOSIS — F02.80 PRIMARY PROGRESSIVE APHASIA: ICD-10-CM

## 2021-11-08 DIAGNOSIS — I70.0 ATHEROSCLEROSIS OF AORTA: ICD-10-CM

## 2021-11-08 DIAGNOSIS — I10 ESSENTIAL HYPERTENSION: Chronic | ICD-10-CM

## 2021-11-08 DIAGNOSIS — G30.9 ALZHEIMER'S DEMENTIA WITHOUT BEHAVIORAL DISTURBANCE, UNSPECIFIED TIMING OF DEMENTIA ONSET: ICD-10-CM

## 2021-11-08 DIAGNOSIS — Z00.00 ENCOUNTER FOR PREVENTIVE HEALTH EXAMINATION: Primary | ICD-10-CM

## 2021-11-08 DIAGNOSIS — G31.01 PRIMARY PROGRESSIVE APHASIA: ICD-10-CM

## 2021-11-08 DIAGNOSIS — E78.5 DYSLIPIDEMIA: Chronic | ICD-10-CM

## 2021-11-08 DIAGNOSIS — F33.1 MAJOR DEPRESSIVE DISORDER, RECURRENT, MODERATE: ICD-10-CM

## 2021-11-08 DIAGNOSIS — F02.80 ALZHEIMER'S DEMENTIA WITHOUT BEHAVIORAL DISTURBANCE, UNSPECIFIED TIMING OF DEMENTIA ONSET: ICD-10-CM

## 2021-11-08 DIAGNOSIS — I72.6 VERTEBRAL ARTERY ANEURYSM: ICD-10-CM

## 2021-11-08 PROCEDURE — G0439 PR MEDICARE ANNUAL WELLNESS SUBSEQUENT VISIT: ICD-10-PCS | Mod: S$GLB,,, | Performed by: NURSE PRACTITIONER

## 2021-11-08 PROCEDURE — G0439 PPPS, SUBSEQ VISIT: HCPCS | Mod: S$GLB,,, | Performed by: NURSE PRACTITIONER

## 2021-11-09 PROBLEM — F03.918 DEMENTIA WITH BEHAVIORAL DISTURBANCE: Status: ACTIVE | Noted: 2021-11-09

## 2021-12-01 RX ORDER — SERTRALINE HYDROCHLORIDE 100 MG/1
TABLET, FILM COATED ORAL
Qty: 90 TABLET | Refills: 0 | Status: SHIPPED | OUTPATIENT
Start: 2021-12-01 | End: 2022-03-16

## 2022-03-08 ENCOUNTER — HOSPITAL ENCOUNTER (OUTPATIENT)
Dept: RADIOLOGY | Facility: HOSPITAL | Age: 72
Discharge: HOME | End: 2022-03-08
Attending: FAMILY MEDICINE
Payer: MEDICARE

## 2022-03-08 DIAGNOSIS — Z12.31 ENCOUNTER FOR SCREENING MAMMOGRAM FOR MALIGNANT NEOPLASM OF BREAST: ICD-10-CM

## 2022-03-08 PROCEDURE — 77067 SCR MAMMO BI INCL CAD: CPT | Mod: TC,PO

## 2022-03-08 PROCEDURE — 77063 BREAST TOMOSYNTHESIS BI: CPT | Mod: 26,,, | Performed by: RADIOLOGY

## 2022-03-08 PROCEDURE — 77067 MAMMO DIGITAL SCREENING BILAT WITH TOMO: ICD-10-PCS | Mod: 26,,, | Performed by: RADIOLOGY

## 2022-03-08 PROCEDURE — 77063 BREAST TOMOSYNTHESIS BI: CPT | Mod: TC,PO

## 2022-03-08 PROCEDURE — 77067 SCR MAMMO BI INCL CAD: CPT | Mod: 26,,, | Performed by: RADIOLOGY

## 2022-03-08 PROCEDURE — 77063 MAMMO DIGITAL SCREENING BILAT WITH TOMO: ICD-10-PCS | Mod: 26,,, | Performed by: RADIOLOGY

## 2022-03-08 NOTE — TELEPHONE ENCOUNTER
Care Due:                  Date            Visit Type   Department     Provider  --------------------------------------------------------------------------------                                EP -                              PRIMARY      [facility] FAM[facility]    Kourtney Yi  Last Visit: 02-      CARE ([facility])   MEDICINE       Anger  Next Visit: None Scheduled  None         None Found                                                            Last  Test          Frequency    Reason                     Performed    Due Date  --------------------------------------------------------------------------------    Office Visit  12 months..  sertraline...............  02- 02-    Powered by [employer]. Reference number: [id number].   3/08/2022 9:47:29 AM CST

## 2022-03-09 ENCOUNTER — HOSPITAL ENCOUNTER (OUTPATIENT)
Dept: RADIOLOGY | Facility: HOSPITAL | Age: 72
Discharge: HOME | End: 2022-03-09
Attending: INTERNAL MEDICINE
Payer: MEDICARE

## 2022-03-09 ENCOUNTER — PATIENT MESSAGE (OUTPATIENT)
Dept: FAMILY MEDICINE | Facility: CLINIC | Age: 72
End: 2022-03-09
Payer: MEDICARE

## 2022-03-09 DIAGNOSIS — E78.2 MIXED HYPERLIPIDEMIA: ICD-10-CM

## 2022-03-09 DIAGNOSIS — I72.6 VERTEBRAL ARTERY ANEURYSM: ICD-10-CM

## 2022-03-09 DIAGNOSIS — N18.31 STAGE 3A CHRONIC KIDNEY DISEASE: ICD-10-CM

## 2022-03-09 DIAGNOSIS — I10 ESSENTIAL HYPERTENSION: ICD-10-CM

## 2022-03-09 PROCEDURE — 70498 CT ANGIOGRAPHY NECK: CPT | Mod: TC,PO

## 2022-03-09 PROCEDURE — 70498 CT ANGIOGRAPHY NECK: CPT | Mod: 26,,, | Performed by: RADIOLOGY

## 2022-03-09 PROCEDURE — 25500020 PHARM REV CODE 255: Mod: PO | Performed by: INTERNAL MEDICINE

## 2022-03-09 PROCEDURE — 70498 CTA NECK: ICD-10-PCS | Mod: 26,,, | Performed by: RADIOLOGY

## 2022-03-09 RX ADMIN — IOHEXOL 100 ML: 350 INJECTION, SOLUTION INTRAVENOUS at 09:03

## 2022-03-16 RX ORDER — SERTRALINE HYDROCHLORIDE 100 MG/1
TABLET, FILM COATED ORAL
Qty: 90 TABLET | Refills: 0 | Status: SHIPPED | OUTPATIENT
Start: 2022-03-16 | End: 2022-06-26

## 2022-03-16 NOTE — TELEPHONE ENCOUNTER
Refill Authorization Note   Cindy Escobedo  is requesting a refill authorization.  Brief Assessment and Rationale for Refill:  Approve     Medication Therapy Plan:  FOVS    Medication Reconciliation Completed: No   Comments:   --->Care Gap information included below if applicable.       Requested Prescriptions   Pending Prescriptions Disp Refills    sertraline (ZOLOFT) 100 MG tablet [Pharmacy Med Name: SERTRALINE 100MG TABLETS] 90 tablet 0     Sig: TAKE 1 TABLET(100 MG) BY MOUTH EVERY DAY       Psychiatry:  Antidepressants - SSRI Passed - 3/8/2022  9:47 AM        Passed - Patient is at least 18 years old        Passed - Valid encounter within last 15 months     Recent Visits  Date Type Provider Dept   02/26/21 Office Visit Kourtney Yi MD [facility]   05/27/20 Office Visit Kourtney Yi MD [facility]   Showing recent visits within past 720 days and meeting all other requirements  Future Appointments  No visits were found meeting these conditions.  Showing future appointments within next 150 days and meeting all other requirements      Future Appointments              In 2 months Kourtney Yi MD [place] [facility], [facility]    In 3 months Josh Ferrara MD [place] [facility] [place]                    Appointments  past 12m or future 3m with PCP    Date Provider   Last Visit   2/26/2021 Kourtney Yi MD   Next Visit   5/23/2022 Kourtney Yi MD   ED visits in past 90 days: 0     Note composed:12:03 PM 03/16/2022

## 2022-04-05 DIAGNOSIS — Z71.89 COMPLEX CARE COORDINATION: ICD-10-CM

## 2022-04-18 RX ORDER — PITAVASTATIN CALCIUM 2.09 MG/1
TABLET, FILM COATED ORAL
Qty: 90 TABLET | Refills: 4 | Status: SHIPPED | OUTPATIENT
Start: 2022-04-18 | End: 2023-04-24

## 2022-05-04 ENCOUNTER — TELEPHONE (OUTPATIENT)
Dept: CARDIOLOGY | Facility: CLINIC | Age: 72
End: 2022-05-04
Payer: MEDICARE

## 2022-05-04 NOTE — TELEPHONE ENCOUNTER
----- Message from Christine Lyn sent at 5/4/2022 11:17 AM CDT -----  Type:  Patient Returning Call    Who Called:Patients  Santiago     Who Left Message for Patient:nurse     Does the patient know what this is regarding?:yes     Would the patient rather a call back or a response via [facility]? Call back     Best Call Back Number:[phone] ([place]) Santiago     Additional Information:

## 2022-05-23 ENCOUNTER — OFFICE VISIT (OUTPATIENT)
Dept: FAMILY MEDICINE | Facility: CLINIC | Age: 72
End: 2022-05-23
Payer: MEDICARE

## 2022-05-23 VITALS
BODY MASS INDEX: 27.83 KG/M2 | RESPIRATION RATE: 20 BRPM | HEIGHT: 60 IN | SYSTOLIC BLOOD PRESSURE: 128 MMHG | DIASTOLIC BLOOD PRESSURE: 82 MMHG | HEART RATE: 61 BPM | WEIGHT: 141.75 LBS | TEMPERATURE: 98 F | OXYGEN SATURATION: 98 %

## 2022-05-23 DIAGNOSIS — I70.0 ATHEROSCLEROSIS OF AORTA: ICD-10-CM

## 2022-05-23 DIAGNOSIS — F33.1 MAJOR DEPRESSIVE DISORDER, RECURRENT, MODERATE: ICD-10-CM

## 2022-05-23 DIAGNOSIS — R47.01 APHASIA: ICD-10-CM

## 2022-05-23 DIAGNOSIS — F03.91 DEMENTIA WITH BEHAVIORAL DISTURBANCE, UNSPECIFIED DEMENTIA TYPE: Primary | ICD-10-CM

## 2022-05-23 DIAGNOSIS — N18.31 STAGE 3A CHRONIC KIDNEY DISEASE: ICD-10-CM

## 2022-05-23 PROCEDURE — 99214 PR OFFICE/OUTPT VISIT, EST, LEVL IV, 30-39 MIN: ICD-10-PCS | Mod: S$GLB,,, | Performed by: FAMILY MEDICINE

## 2022-05-23 PROCEDURE — 99214 OFFICE O/P EST MOD 30 MIN: CPT | Mod: S$GLB,,, | Performed by: FAMILY MEDICINE

## 2022-05-28 NOTE — PROGRESS NOTES
Subjective:       Patient ID: Cindy Escobedo is a 72 y.o. female.    Chief Complaint: Follow-up    HPI   The patient is a 72-year-old with dementia and primary progressive aphasia who is here today with her  for follow-up.    Today we discussed the followin) dementia with primary progressive aphasia.  She seems to be having more issues with her dementia and aphasia.  Her  has to decide and supervise everything that they do.  She has challenges doing her ADLs.  Getting out of the shower, she has trouble drying herself even if he gives her verbal directions about how to do that.  Getting dressed, she has trouble with her buttons zippers and belts so they can not go out for long because she can not go to the bathroom independently.  At home, she sometimes forgets to wipe after using the bathroom and will get confused about how to re-dress after using the bathroom - sometimes taking her underwear off and putting them in inappropriate places or hiding them.  She cannot find anything in the Fridge.  She cannot hang up the clothes because she does not know what to do with the hangers.  Her  is having more more challenges taking care of her and feels like he might need a break.  Both of his daughters are school teachers and he is thinking about having them care for their mom so they can get an idea of the extent of her disease process and give him a break.  We did discuss assisted living facilities which they are not interested in at this time.  She is no longer taking the Aricept or the Namenda as these were not helpful.  Of note, her neurologist Dr. Rossi is retiring this summer and they need to transfer to a new neurologist.  2) hypertension.  Today her blood pressure is 128/82.  She is currently taking atenolol and Diovan HCT  3) depression and anxiety.  She is doing well with the Zoloft.  They would like to continue with this dose as is for now  4) hyperlipidemia.  She does continue with the  Livalo and fenofibrate which she is tolerating well.  Her recent total cholesterol was 166, her triglycerides are 113 and her LDL was 106          Review of Systems   Constitutional: Negative for appetite change, chills, diaphoresis, fatigue, fever and unexpected weight change.   HENT: Negative for congestion, ear pain, postnasal drip, rhinorrhea, sinus pressure, sneezing, sore throat and trouble swallowing.    Eyes: Negative for pain, discharge and visual disturbance.   Respiratory: Negative for cough, chest tightness, shortness of breath and wheezing.    Cardiovascular: Negative for chest pain, palpitations and leg swelling.   Gastrointestinal: Negative for abdominal distention, abdominal pain, blood in stool, constipation, diarrhea, nausea and vomiting.   Skin: Negative for rash.   Psychiatric/Behavioral: Positive for behavioral problems and confusion. Negative for dysphoric mood, self-injury, sleep disturbance and suicidal ideas. The patient is not nervous/anxious.        Objective:      Physical Exam  Constitutional:       General: She is not in acute distress.     Appearance: Normal appearance. She is well-developed.   HENT:      Head: Normocephalic and atraumatic.      Right Ear: Hearing, tympanic membrane, ear canal and external ear normal.      Left Ear: Hearing, tympanic membrane, ear canal and external ear normal.      Nose: Nose normal.      Mouth/Throat:      Mouth: No oral lesions.      Pharynx: No oropharyngeal exudate or posterior oropharyngeal erythema.   Eyes:      General: Lids are normal. No scleral icterus.     Extraocular Movements: Extraocular movements intact.      Conjunctiva/sclera: Conjunctivae normal.      Pupils: Pupils are equal, round, and reactive to light.   Neck:      Thyroid: No thyroid mass or thyromegaly.      Vascular: No carotid bruit.   Cardiovascular:      Rate and Rhythm: Normal rate and regular rhythm.  No extrasystoles are present.     Chest Wall: PMI is not displaced.       Heart sounds: Normal heart sounds. No murmur heard.    No gallop.   Pulmonary:      Effort: Pulmonary effort is normal. No accessory muscle usage or respiratory distress.      Breath sounds: Normal breath sounds.   Chest:   Breasts:      Right: No supraclavicular adenopathy.      Left: No supraclavicular adenopathy.       Abdominal:      General: Bowel sounds are normal. There is no abdominal bruit.      Palpations: Abdomen is soft.      Tenderness: There is no abdominal tenderness. There is no rebound.   Musculoskeletal:      Cervical back: Normal range of motion and neck supple.   Lymphadenopathy:      Head:      Right side of head: No submental or submandibular adenopathy.      Left side of head: No submental or submandibular adenopathy.      Cervical:      Right cervical: No superficial, deep or posterior cervical adenopathy.     Left cervical: No superficial, deep or posterior cervical adenopathy.      Upper Body:      Right upper body: No supraclavicular adenopathy.      Left upper body: No supraclavicular adenopathy.   Skin:     General: Skin is warm and dry.   Neurological:      Mental Status: She is alert and oriented to person, place, and time.   Psychiatric:         Attention and Perception: Attention and perception normal.         Mood and Affect: Mood normal. Affect is flat.         Speech: Speech is delayed.         Behavior: Behavior is slowed. Behavior is cooperative.         Cognition and Memory: Cognition is impaired. Memory is impaired.       Blood pressure 128/82, pulse 61, temperature 98.2 °F (36.8 °C), resp. rate 20, height 5' (1.524 m), weight 64.3 kg (141 lb 12.1 oz), SpO2 98 %.Body mass index is 27.68 kg/m².              A/P:  1)  dementia with primary progressive aphasia.  Progressive.  They will establish with a new neurologist.  We will request her neurology records.  We did discuss assisted living facilities , nursing home and respite care.  We also discussed [facility] for additional  resources which he will investigate.  I also encouraged him to take a vacation this summer while his daughters care for his wife so that he can have a break  2) hypertension.  Well controlled.  Continue with atenolol and Diovan HCT  3) depression and anxiety.  Well controlled.  Continue with Zoloft  4) hyperlipidemia.  Well controlled.  Continue with  Livalo and fenofibrate.  We may consider a trial without the fenofibrate in the future to see how she does with the Livalo alone    5) aortic atherosclerosis.  Asymptomatic   6)  Chronic kidney disease.  Stable.  We will continue to monitor this

## 2022-05-30 ENCOUNTER — TELEPHONE (OUTPATIENT)
Dept: NEUROLOGY | Facility: CLINIC | Age: 72
End: 2022-05-30
Payer: MEDICARE

## 2022-05-30 ENCOUNTER — OFFICE VISIT (OUTPATIENT)
Dept: CARDIOLOGY | Facility: CLINIC | Age: 72
End: 2022-05-30
Payer: MEDICARE

## 2022-05-30 VITALS
BODY MASS INDEX: 28.7 KG/M2 | DIASTOLIC BLOOD PRESSURE: 56 MMHG | WEIGHT: 146.19 LBS | HEART RATE: 61 BPM | SYSTOLIC BLOOD PRESSURE: 103 MMHG | HEIGHT: 60 IN

## 2022-05-30 DIAGNOSIS — N18.31 STAGE 3A CHRONIC KIDNEY DISEASE: ICD-10-CM

## 2022-05-30 DIAGNOSIS — G31.01 PRIMARY PROGRESSIVE APHASIA: ICD-10-CM

## 2022-05-30 DIAGNOSIS — E78.5 DYSLIPIDEMIA: Chronic | ICD-10-CM

## 2022-05-30 DIAGNOSIS — Z98.890 H/O CAROTID ENDARTERECTOMY: ICD-10-CM

## 2022-05-30 DIAGNOSIS — I10 ESSENTIAL HYPERTENSION: Chronic | ICD-10-CM

## 2022-05-30 DIAGNOSIS — I72.6 VERTEBRAL ARTERY ANEURYSM: Primary | ICD-10-CM

## 2022-05-30 DIAGNOSIS — F02.80 PRIMARY PROGRESSIVE APHASIA: ICD-10-CM

## 2022-05-30 DIAGNOSIS — I70.0 ATHEROSCLEROSIS OF AORTA: ICD-10-CM

## 2022-05-30 DIAGNOSIS — K21.9 GASTROESOPHAGEAL REFLUX DISEASE, UNSPECIFIED WHETHER ESOPHAGITIS PRESENT: ICD-10-CM

## 2022-05-30 DIAGNOSIS — Z98.1 HISTORY OF FUSION OF CERVICAL SPINE: ICD-10-CM

## 2022-05-30 DIAGNOSIS — R79.89 ELEVATED LFTS: ICD-10-CM

## 2022-05-30 PROCEDURE — 99999 PR PBB SHADOW E&M-EST. PATIENT-LVL III: ICD-10-PCS | Mod: PBBFAC,,, | Performed by: INTERNAL MEDICINE

## 2022-05-30 PROCEDURE — 99999 PR PBB SHADOW E&M-EST. PATIENT-LVL III: CPT | Mod: PBBFAC,,, | Performed by: INTERNAL MEDICINE

## 2022-05-30 PROCEDURE — 99214 PR OFFICE/OUTPT VISIT, EST, LEVL IV, 30-39 MIN: ICD-10-PCS | Mod: S$PBB,,, | Performed by: INTERNAL MEDICINE

## 2022-05-30 PROCEDURE — 99214 OFFICE O/P EST MOD 30 MIN: CPT | Mod: S$PBB,,, | Performed by: INTERNAL MEDICINE

## 2022-05-30 PROCEDURE — 99213 OFFICE O/P EST LOW 20 MIN: CPT | Mod: PBBFAC,PO | Performed by: INTERNAL MEDICINE

## 2022-05-30 NOTE — TELEPHONE ENCOUNTER
Reached out to patient's spouse Santiago to schedule consultation appointment from referral - Dr. Yi.  Patient's spouse did not answer and voicemail left.  Good contact number given.                    ----- Message from Pham Tracey LPN sent at 5/30/2022  9:02 AM CDT -----  Good morning!  Patient has a neuro referral in.  Please schedule next available.  Thank you.          Thank you for choosing [facility]!    Shann Fayard, LPN [facility] - [address] [address]  Ph: [phone]  Fax: [phone]

## 2022-05-30 NOTE — PROGRESS NOTES
"Subjective:    Patient ID:  Cindy Escobedo is a 72 y.o. female patient here for evaluation Hypertension      History of Present Illness:  The follow-up.  Known peripheral arterial disease status post right CEA/20.  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  CTA performed 3/22, stable aneurysmal appearance.  No diameter change.  Mildly progressive dementia.  No history of seizure activity.  No chest pain shortness of breath, PND or orthopnea    Two years ago, history of anemia with blood transfusions in the past x2.  No recurrence.     History of CA, no DVT PE.        Review of patient's allergies indicates:   Allergen Reactions    Codeine Anaphylaxis    Ace inhibitors      Other reaction(s): cough    Cat hair extract      Other reaction(s): Sneezing  Other reaction(s): Rash    Epinephrine      Other reaction(s): Tachycardia  Other reaction(s): Tachycardia    Hydrocodone-acetaminophen      Other reaction(s): Anaphylaxis  Other reaction(s): Anaphylaxis    Lipitor [atorvastatin]      Myalgias      Oxycodone hcl-oxycodone-asa      Other reaction(s): Agitation  Other reaction(s): Agitation    Oxytetracycline      Other reaction(s): Fainting  Other reaction(s): Fainting    Rosuvastatin      Other reaction(s): Muscle pain  Other reaction(s): Muscle pain    Sulfamethoxazole-trimethoprim      Other reaction(s): "Cracked lips"  Other reaction(s): "Cracked lips"       Past Medical History:   Diagnosis Date    Allergy     Anticoagulant long-term use     Carotid arterial disease     mild in 2012    Cataract     Dementia     Depression     Diverticular disease     noted on cscope    Encounter for blood transfusion     Fatty liver     H/O esophagogastroduodenoscopy     last 8/17    HEARING LOSS     History of esophagitis     noted on 8/17 EGD    History of gastritis     noted on EGD    HTN (hypertension)     Hyperlipidemia     LFT elevation     US 1/18 with fatty liver    FARA (obstructive " sleep apnea)     no CPAP    Osteoarthritis     elevated CRP    Palpitations 2012    05/10/2001 all coronaries normal;      Prediabetes     Primary progressive aphasia     S/P colonoscopy     3/19 repeat 3/29    Schatzki's ring     s/p dilation     TIA (transient ischemic attack)     Valvular heart disease     mod [place]     Vertebral artery aneurysm 3/28/2013    2012 angio 4.5 millimeter diameter fusiform left V4 segment aneurysm Minimal intracranial atherosclerosis      Past Surgical History:   Procedure Laterality Date    APPENDECTOMY      BLADDER SUSPENSION      BREAST BIOPSY Left     neg    CAROTID ENDARTERECTOMY Right 2020    Procedure: ENDARTERECTOMY-CAROTID;  Surgeon: Melisa Hudson MD;  Location: [facility];  Service: Cardiovascular;  Laterality: Right;    CARPAL TUNNEL RELEASE      right    CERVICAL FUSION      seen Dr. Raymond patricia removed-RUL      CHOLECYSTECTOMY      DILATION AND CURETTAGE OF UTERUS      esophageal hernia repair      ESOPHAGUS SURGERY      HYSTERECTOMY      due to excessive bleeding    JOINT REPLACEMENT      partial left knee    KNEE ARTHROSCOPY      left    SINUS SURGERY      TONSILLECTOMY, ADENOIDECTOMY, BILATERAL MYRINGOTOMY AND TUBES      UVULOPALATOPHARYNGOPLASTY AND SEPTOPLASTY      VAGINAL DELIVERY      times 2     Social History     Tobacco Use    Smoking status: Former Smoker     Quit date: 1974     Years since quittin.9    Smokeless tobacco: Never Used    Tobacco comment: quit in high school   Substance Use Topics    Alcohol use: No    Drug use: No        Review of Systems:    As noted in HPI in addition      REVIEW OF SYSTEMS  Review of Systems   Constitutional: Negative for decreased appetite, diaphoresis, night sweats, weight gain and weight loss.   HENT: Negative for nosebleeds and odynophagia.    Eyes: Negative for double vision and photophobia.   Cardiovascular: Negative for chest pain, claudication, cyanosis,  dyspnea on exertion, irregular heartbeat, leg swelling, near-syncope, orthopnea, palpitations, paroxysmal nocturnal dyspnea and syncope.   Respiratory: Negative for cough, hemoptysis, shortness of breath and wheezing.    Hematologic/Lymphatic: Negative for adenopathy.   Skin: Negative for flushing, skin cancer and suspicious lesions.   Musculoskeletal: Negative for gout, myalgias and neck pain.   Gastrointestinal: Negative for abdominal pain, heartburn, hematemesis and hematochezia.   Genitourinary: Negative for bladder incontinence, hesitancy and nocturia.   Neurological: Negative for focal weakness, headaches, light-headedness and paresthesias.        Cognitive dysfunction   Psychiatric/Behavioral: Negative for memory loss and substance abuse.              Objective:        Vitals:    05/30/22 1525   BP: (!) 103/56   Pulse: 61       Lab Results   Component Value Date    WBC 5.93 03/09/2022    HGB 14.1 03/09/2022    HCT 41.9 03/09/2022     03/09/2022    CHOL 166 03/09/2022    TRIG 113 03/09/2022    HDL 37 (L) 03/09/2022    ALT 31 03/09/2022    AST 25 03/09/2022     03/09/2022    K 3.9 03/09/2022     03/09/2022    CREATININE 1.1 03/09/2022    BUN 25 (H) 03/09/2022    CO2 25 03/09/2022    TSH 3.122 09/10/2020    INR 1.0 08/14/2020    HGBA1C 5.5 06/08/2020        ECHOCARDIOGRAM RESULTS  Results for orders placed in visit on 06/08/20    Transthoracic echo (TTE) 2D with Color Flow    Interpretation Summary  · Normal left ventricular systolic function. The estimated ejection fraction is 55%.  · Normal LV diastolic function.  · Intermediate central venous pressure (8 mmHg).  · The estimated PA systolic pressure is 39 mmHg.        CURRENT/PREVIOUS VISIT EKG  Results for orders placed or performed during the hospital encounter of 08/14/20   EKG 12-LEAD    Collection Time: 08/14/20 10:24 AM    Narrative    Test Reason : I65.21,    Vent. Rate : 051 BPM     Atrial Rate : 051 BPM     P-R Int : 138 ms           QRS Dur : 084 ms      QT Int : 444 ms       P-R-T Axes : 034 045 018 degrees     QTc Int : 409 ms    Sinus bradycardia  Nonspecific T wave abnormality  Abnormal ECG  When compared with ECG of 07-JUN-2013 10:30,  Nonspecific T wave abnormality now evident in Anterior-lateral leads  Confirmed by Manjinder DOHERTY MD, Allen VILLEGAS ([id number]) on 8/17/2020 6:40:32 AM    Referred By: NATALIYA PEREZ           Confirmed By:Allen Dunbar III, MD     No valid procedures specified.   No results found for this or any previous visit.    No valid procedures specified.    PHYSICAL EXAM  CONSTITUTIONAL: Well built, well nourished in no apparent distress  NECK: no carotid bruit, no JVD  LUNGS: CTA  CHEST WALL: no tenderness,  HEART: regular rate and rhythm, S1, S2 normal, no murmur, click, rub or gallop   ABDOMEN: soft, non-tender; bowel sounds normal; no masses,  no organomegaly  EXTREMITIES: Extremities normal, no edema, no calf tenderness noted  NEURO: AAO X 3    I HAVE REVIEWED :    The vital signs, nurses notes, and all the pertinent radiology and labs.         Current Outpatient Medications   Medication Instructions    aspirin (ECOTRIN) 81 mg, Oral, Daily    atenoloL (TENORMIN) 25 mg, Oral, Nightly    cetirizine 10 mg, Oral, Daily, DO NOT TAKE MORNING OF SURGERY    fenofibric acid (FIBRICOR) 45 mg, Oral, Daily    LIVALO 2 mg Tab tablet TAKE 1 TABLET(2 MG) BY MOUTH EVERY EVENING    multivitamin (THERAGRAN) per tablet 1 tablet, Oral, Daily, DO NOT TAKE MORNING OF SURGERY    sertraline (ZOLOFT) 100 MG tablet TAKE 1 TABLET(100 MG) BY MOUTH EVERY DAY    valsartan-hydrochlorothiazide (DIOVAN-HCT) 320-12.5 mg per tablet 1 tablet, Oral, Every morning    vitamin D (VITAMIN D3) 1,000 Units, Oral, Daily    zolpidem (AMBIEN) 5 mg, Oral, Nightly PRN, TAKE NIGHT BEFORE SURGERY IF NEEDED          Assessment:   Peripheral arterial disease status post right CE 8/20.  Known vertebral artery aneurysm stable in appearance by CTA of the neck 3/22.  No  known coronary artery disease.  History of dementia  History of hypertension, dyslipidemia.      Plan:   Labs per primary care.  Cardiac exam review of systems stable.  6m          No follow-ups on file.

## 2022-06-21 ENCOUNTER — TELEPHONE (OUTPATIENT)
Dept: NEUROLOGY | Facility: CLINIC | Age: 72
End: 2022-06-21
Payer: MEDICARE

## 2022-06-21 NOTE — TELEPHONE ENCOUNTER
Called and spoke to patient's spouse and informed that I had called previously to schedule patient with Caitlin Walker NP for sooner appointment and the appointment with Dr. Amaya was made after initial call.  Patient's spouse informed that patient was previously seeing Dr. Rossi with the [facility] and needing to establish with new neurologist.  Patient's spouse informed that Dr. Yi, patient's pcp requested neurology records.  Clarified patient's appointment with Dr. Amaya is still on for 10/10/22.  Patient's spouse v/u.             ----- Message from Adama Romo sent at 6/21/2022 11:28 AM CDT -----  Regarding: ret call  Type:  Patient Returning Call    Who Called:   // Santiago    Who Left Message for Patient:   states VM says Caitlin     Does the patient know what this is regarding?:  scheduling from referral    Best Call Back Number:  [phone] / Santiago     Additional Information:  pt has referral on chart that was scheduled with dr amaya on 10/10/22. Says was called to schedule with SAMEER Walker. Not sure why called cannot see notes in epic that call was made.

## 2022-06-25 NOTE — TELEPHONE ENCOUNTER
No new care gaps identified.  [facility] Embedded Care Gaps. Reference number: [id number]. 6/25/2022   12:52:36 PM CDT

## 2022-06-26 RX ORDER — SERTRALINE HYDROCHLORIDE 100 MG/1
TABLET, FILM COATED ORAL
Qty: 90 TABLET | Refills: 3 | Status: SHIPPED | OUTPATIENT
Start: 2022-06-26 | End: 2023-07-25

## 2022-06-26 NOTE — TELEPHONE ENCOUNTER
Refill Decision Note   Cindy Escobedo  is requesting a refill authorization.  Brief Assessment and Rationale for Refill:  Approve     Medication Therapy Plan:       Medication Reconciliation Completed: No   Comments:     No Care Gaps recommended.     Note composed:1:15 PM 06/26/2022

## 2022-07-30 NOTE — TELEPHONE ENCOUNTER
----- Message from Deanna Rivera sent at 9/17/2020 10:40 AM CDT -----  Regarding: advise  Contact: /Santiago/[phone] (home) [phone]  Type: Needs Medical Advice  Who Called:  /Santiago/[phone] (home) [phone]    Additional Information: Patient had a procedure recently and on top of the scar, there looks like there is a crust on the incision. He wants to know if this is a scab or a stitch. He is afraid to do anything with it. He is very upset because he called yesterday and did not get a return call. Please call to advise.        sees better too: on antibiotics    7/28: off antibiotics now: now found to have metastatic malignancy:    7/29: finished antibiotics: WB is still high: secondary to malignancy:    7/30: for biopsy by [facility] on monday

## 2022-10-10 ENCOUNTER — OFFICE VISIT (OUTPATIENT)
Dept: NEUROLOGY | Facility: CLINIC | Age: 72
End: 2022-10-10
Payer: MEDICARE

## 2022-10-10 VITALS
DIASTOLIC BLOOD PRESSURE: 78 MMHG | HEART RATE: 57 BPM | WEIGHT: 142.19 LBS | BODY MASS INDEX: 27.92 KG/M2 | SYSTOLIC BLOOD PRESSURE: 128 MMHG | HEIGHT: 60 IN

## 2022-10-10 DIAGNOSIS — F02.80 PRIMARY PROGRESSIVE APHASIA: Primary | ICD-10-CM

## 2022-10-10 DIAGNOSIS — R47.01 APHASIA: ICD-10-CM

## 2022-10-10 DIAGNOSIS — G31.01 PRIMARY PROGRESSIVE APHASIA: Primary | ICD-10-CM

## 2022-10-10 PROCEDURE — 99999 PR PBB SHADOW E&M-EST. PATIENT-LVL V: CPT | Mod: PBBFAC,,, | Performed by: PSYCHIATRY & NEUROLOGY

## 2022-10-10 PROCEDURE — 99999 PR PBB SHADOW E&M-EST. PATIENT-LVL V: ICD-10-PCS | Mod: PBBFAC,,, | Performed by: PSYCHIATRY & NEUROLOGY

## 2022-10-10 PROCEDURE — 99205 PR OFFICE/OUTPT VISIT, NEW, LEVL V, 60-74 MIN: ICD-10-PCS | Mod: S$PBB,,, | Performed by: PSYCHIATRY & NEUROLOGY

## 2022-10-10 PROCEDURE — 99205 OFFICE O/P NEW HI 60 MIN: CPT | Mod: S$PBB,,, | Performed by: PSYCHIATRY & NEUROLOGY

## 2022-10-10 PROCEDURE — 99215 OFFICE O/P EST HI 40 MIN: CPT | Mod: PBBFAC,PO | Performed by: PSYCHIATRY & NEUROLOGY

## 2022-10-10 NOTE — PROGRESS NOTES
Date: 10/10/2022    Patient ID: Cindy Escobedo is a 72 y.o. female.    Referring Provider:  Kourtney Yi MD    Chief Complaint: Aphasia and Memory Loss      History of Present Illness:  Ms. Escobedo is a 72 y.o. female who presents referred by Kourtney Yi MD today for evaluation of dementia, PPA. The patient was accompanied by her  who also contributed to the following history.     She was previously followed by Dr. Rossi and diagnosed with primary progressive aphasia in about 2014. She had difficulty with words and conversations in a progressively worsening manner. She is not on aricept or namenda. She was on aricept but this was stopped due to nasal dripping. Memantine was stopped as well.     Her  notes that she is progressively worsening. He notes trouble with understanding instructions too She has some trouble eating as well. She has trouble using the utensils. He helps her with bathing and getting dressed too. She has trouble opening canned drinks.     She gets sad or depressed occasionally. Not as much crying.     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 March 2022 CTA showed stable 6 mm aneurysm. Dr. Hudson did a carotid US.     Allergies:  Review of patient's allergies indicates:   Allergen Reactions    Codeine Anaphylaxis    Ace inhibitors      Other reaction(s): cough    Cat hair extract      Other reaction(s): Sneezing  Other reaction(s): Rash    Epinephrine      Other reaction(s): Tachycardia  Other reaction(s): Tachycardia    Hydrocodone-acetaminophen      Other reaction(s): Anaphylaxis  Other reaction(s): Anaphylaxis    Lipitor [atorvastatin]      Myalgias      Oxycodone hcl-oxycodone-asa      Other reaction(s): Agitation  Other reaction(s): Agitation    Oxytetracycline      Other reaction(s): Fainting  Other reaction(s): Fainting    Rosuvastatin      Other reaction(s): Muscle pain  Other reaction(s): Muscle pain    Sulfamethoxazole-trimethoprim      Other  "reaction(s): "Cracked lips"  Other reaction(s): "Cracked lips"       Current Medications:  Current Outpatient Medications   Medication Sig Dispense Refill    aspirin (ECOTRIN) 81 MG EC tablet Take 81 mg by mouth once daily.      atenoloL (TENORMIN) 25 MG tablet TAKE 1 TABLET BY MOUTH EVERY EVENING. 180 tablet 4    fenofibric acid (FIBRICOR) 45 mg CpDR Take 1 capsule (45 mg total) by mouth once daily. 90 capsule 4    LIVALO 2 mg Tab tablet TAKE 1 TABLET(2 MG) BY MOUTH EVERY EVENING 90 tablet 4    sertraline (ZOLOFT) 100 MG tablet TAKE 1 TABLET(100 MG) BY MOUTH EVERY DAY 90 tablet 3    valsartan-hydrochlorothiazide (DIOVAN-HCT) 320-12.5 mg per tablet Take 1 tablet by mouth every morning. 90 tablet 4    vitamin D (VITAMIN D3) 1000 units Tab Take 1,000 Units by mouth once daily.      cetirizine 10 mg TbDL Take 10 mg by mouth once daily. DO NOT TAKE MORNING OF SURGERY      multivitamin (THERAGRAN) per tablet Take 1 tablet by mouth once daily. DO NOT TAKE MORNING OF SURGERY      zolpidem (AMBIEN) 5 MG Tab Take 5 mg by mouth nightly as needed (insomnia). TAKE NIGHT BEFORE SURGERY IF NEEDED  1     No current facility-administered medications for this visit.       Past Medical History:  Past Medical History:   Diagnosis Date    Allergy     Anticoagulant long-term use     Carotid arterial disease     mild in 2012    Cataract     Depression     Diverticular disease     noted on cscope    Encounter for blood transfusion     Fatty liver     Frontotemporal dementia     language variant    H/O esophagogastroduodenoscopy     last 8/17    HEARING LOSS     History of esophagitis     noted on 8/17 EGD    History of gastritis     noted on EGD    HTN (hypertension)     Hyperlipidemia     LFT elevation     US 1/18 with fatty liver    FARA (obstructive sleep apnea)     no CPAP    Osteoarthritis     elevated CRP    Palpitations 05/07/2012    05/10/2001 all coronaries normal;      Prediabetes     Primary progressive aphasia     S/P " colonoscopy     3/19 repeat 3/29    Schatzki's ring     s/p dilation 8/17    TIA (transient ischemic attack)     Valvular heart disease     mod CO 4/16    Vertebral artery aneurysm 03/28/2013 7/2012 angio 4.5 millimeter diameter fusiform left V4 segment aneurysm Minimal intracranial atherosclerosis        Past Surgical History:  Past Surgical History:   Procedure Laterality Date    APPENDECTOMY      BLADDER SUSPENSION  1993    BREAST BIOPSY Left     neg    CAROTID ENDARTERECTOMY Right 8/17/2020    Procedure: ENDARTERECTOMY-CAROTID;  Surgeon: Melisa Hudson MD;  Location: [facility];  Service: Cardiovascular;  Laterality: Right;    CARPAL TUNNEL RELEASE      right    CERVICAL FUSION      seen Dr. Raymond patricia removed-RUL      CHOLECYSTECTOMY      DILATION AND CURETTAGE OF UTERUS      esophageal hernia repair      ESOPHAGUS SURGERY      HYSTERECTOMY      due to excessive bleeding    JOINT REPLACEMENT      partial left knee    KNEE ARTHROSCOPY      left    SINUS SURGERY      TONSILLECTOMY, ADENOIDECTOMY, BILATERAL MYRINGOTOMY AND TUBES      UVULOPALATOPHARYNGOPLASTY AND SEPTOPLASTY      VAGINAL DELIVERY      times 2       Family History:  family history includes Breast cancer in her maternal grandmother; COPD in her mother; Cancer in her maternal grandmother; Cataracts in her sister; Glaucoma in her sister; Stroke in her father; Uveitis in her sister and sister.    Social History:   reports that she quit smoking about 48 years ago. She has never used smokeless tobacco. She reports that she does not drink alcohol and does not use drugs.    Physical Exam:  Vitals:    10/10/22 0857   BP: 128/78   Pulse: (!) 57   Weight: 64.5 kg (142 lb 3.2 oz)   Height: 5' (1.524 m)   PainSc: 0-No pain     Body mass index is 27.77 kg/m².    Neurological Exam:  General: well-developed, well-nourished, no distress  Mental status: Awake and alert. MMSE 0/30  Speech language: severe aphasia   Cranial nerves: Face symmetric  Motor: Moves  "all extremities well  Coordination: No ataxia. No tremor.      MMSE 10/10/2022   What is the (year), (season), (date), (day), (month)? 0   Where are we (state), (country), (town or city), (hospital), (floor)? 0   Name 3 common objects (eg. "apple", "table", "alicia"). Take 1 second to say each. Then ask the patient to repeat all 3. Give 1 point for each correct answer. Then repeat them until he/she learns all 3. Count trials and record. 0   Serial 7's backwards. Stop after 5 answers. (100,93,86,79,72) or alternatively  spell "WORLD" backwards. (D..L..R..O..W). The score is the number of letters in correct order. 0   Ask for the 3 common objects named earlier in the exam. Give 1 point for each correct answer. 0   Name a "pencil" and "watch." 0   Repeat the following: "No ifs, ands, or buts." 0   Follow a 3-stage command: "Take a paper in your right hand, fold it in half, & put it on the floor." 0   Read and obey the following: (see paper exam) 0   Write a sentence. 0   Copy the following design: (see paper exam) 0   Total MMSE Score 0   Some recent data might be hidden        Data:  I have personally reviewed the referring provider's notes, labs, & imaging made available to me today.     Labs:  CBC:   Lab Results   Component Value Date    WBC 5.93 03/09/2022    HGB 14.1 03/09/2022    HCT 41.9 03/09/2022     03/09/2022    MCV 93 03/09/2022    RDW 13.0 03/09/2022     BMP:   Lab Results   Component Value Date     03/09/2022    K 3.9 03/09/2022     03/09/2022    CO2 25 03/09/2022    BUN 25 (H) 03/09/2022    CREATININE 1.1 03/09/2022     (H) 03/09/2022    CALCIUM 9.8 03/09/2022    PHOS 2.2 (L) 08/18/2020     LFTS;   Lab Results   Component Value Date    PROT 7.6 03/09/2022    ALBUMIN 4.4 03/09/2022    BILITOT 0.7 03/09/2022    AST 25 03/09/2022    ALKPHOS 68 03/09/2022    ALT 31 03/09/2022     COAGS:   Lab Results   Component Value Date    INR 1.0 08/14/2020     FLP:   Lab Results   Component " Value Date    CHOL 166 03/09/2022    HDL 37 (L) 03/09/2022    LDLCALC 106.4 03/09/2022    TRIG 113 03/09/2022    CHOLHDL 22.3 03/09/2022         Assessment and Plan:  Ms. Escobedo is a 72 y.o. female referred to me by Kourtney Yi MD for evaluation of PPA. She is unfortunately progressing. Will refer to case management to discuss options with her . Will remain off memantine and aricept for now.     Will also refer to speech therapy to see if they have ideas to help her communicate better. While I explained this won't help her get better, they may have strategies to help since she can clearly still understand more than she express.         Primary progressive aphasia  -     Ambulatory referral/consult to Outpatient Case Management  -     Ambulatory referral/consult to Speech Therapy; Future; Expected date: 10/17/2022    Aphasia  -     Ambulatory referral/consult to Neurology     I spent a total of 61 minutes on the day of the visit.This includes face to face time and non-face to face time preparing to see the patient (eg, review of tests), Obtaining and/or reviewing separately obtained history, Documenting clinical information in the electronic or other health record, Independently interpreting results and communicating results to the patient/family/caregiver, or Care coordination.

## 2022-10-10 NOTE — PATIENT INSTRUCTIONS
To prevent further memory loss, some of the best preventative measures are following a healthy diet, getting regular exercise, and ensuring good sleep habits.  Approximately 30 to 45 minutes of brisk physical activity (brisk walking, swimming, stationary bicycle, etc.) 5 days a week has been shown to improve function in vascular dementias, and can lower the risk of stroke and slow progression of memory loss.    A Mediterranean style diet, or the DASH diet with lots of fresh fruits and vegetables, whole grains, more fish and chicken, less red meat, less dairy, less processed foods is also beneficial. For more information see:    https://www.rush.[facility]/news/diet-may-help-prevent-alzheimers     Minimize or eliminate the use of alcohol, and discuss any prescription medications you might be taking with your doctor to avoid medications which can cause sedation or worsen cognitive function.    Establish a regular, consistent sleep pattern and practice good sleep hygiene.  Avoid screen time (computer, TV, smartphones or tablets) or heavy meals for at least an hour before bedtime, and avoid caffeine or stimulants after 2 PM. Exercise earlier in the day or mornings and keep your sleeping environment comfortable.     Socializing with friends and family and staying both mentally and physically active is also very important. Continue with hobbies or activities that are engaging and practice activities that are mentally stimulating (word puzzles, Sudoku, etc) and stay active in the community.    Please look into the following websites, to help you find resources including day programs, caregivers and caregiver support, legal questions, support groups, etc.    [facility] on [facility] ([facility])  [facility] - [address]  [facility] - [address]    Group meetings facilitated by Aníbal Meek MA, CHRIS [phone]    ADDRESS  Mailing Address:  [address]  [place], [place] [address] [place] [address]  Address:  [address] [place] [address]   Web Address:  [email or web address]       Services offered at this location:  Chore, Congregate Meals, Home Delivered Meals, Homemaker, Information and Assistance, Legal, Material Aid, Medical Alert, Medication Management, NFCSP Information and Assistance, NFCSP In-Home Respite, NFCSP Material Aid, NGCSP Personal Care , NFCSP Public Education, NFCSP Sitter Service, NFCSP Support Groups, Nutrition Counseling, Nutrition Education, Outreach, Recreation, Transportation, Utility Assistance, Wellness, Area Agency on Aging, [place] on Aging        Website for the Alzheimer's Association:  http://www.alz.org/   Excellent resources for finding community resources  Action plan navigator and online tools  Call [phone] for 24/7 helpline    http://www.communityresourcefinder.org    [facility] Office of Aging and Adult Services:  Http://www.ldh.[place].[facility]/index.cfm/subhome/12/n/7    Peace With Dementia: http://careMarine Life Research.LuxTicket.sg/    Website for [facility] Agency on Ageing: http://goea.[place].[facility]/index.cfm?md=clyde&tmp=category&catID=38&nid=24&ssid=0&startIndex=1      PATIENT/FAMILY RESOURCES:  1. Alzheimer's Association                                                                 http://www.alz.org  2. Alzheimer's Foundation of Anat                                               http://www.alzfdn.org  3. The Alzheimer's Disease Education and Referral Center             https://www.chava.nih.gov/alzheimers  4. Lewy Body Dementia Association                                                  http://www.lbda.org  5. [place] of Mental Health                                                 http://www.nimh.nih.gov  6. National [place] on Mental Illness                                                http://www.favio.org  7. Mental Health Anat                                                                   http://www.mentalhealthamerica.net  8.  Mental Health.gov                                                                           http://www.mentalhealth.gov  9. National [place] for Behavioral Health                                          http://www.thenationalcouncil.org  10. Substance Abuse and Mental Health Services Administration   http://www.samhsa.gov  11. Licensed local counselors, social workers, psychiatrists, psychologists - one starting point is the Psychology Today website, therapist finder

## 2022-10-14 ENCOUNTER — OFFICE VISIT (OUTPATIENT)
Dept: OPTOMETRY | Facility: CLINIC | Age: 72
End: 2022-10-14
Payer: MEDICARE

## 2022-10-14 DIAGNOSIS — H25.13 NUCLEAR SCLEROSIS, BILATERAL: Primary | ICD-10-CM

## 2022-10-14 PROCEDURE — 92014 COMPRE OPH EXAM EST PT 1/>: CPT | Mod: S$PBB,,, | Performed by: OPTOMETRIST

## 2022-10-14 PROCEDURE — 99999 PR PBB SHADOW E&M-EST. PATIENT-LVL III: ICD-10-PCS | Mod: PBBFAC,,, | Performed by: OPTOMETRIST

## 2022-10-14 PROCEDURE — 99213 OFFICE O/P EST LOW 20 MIN: CPT | Mod: PBBFAC,PO | Performed by: OPTOMETRIST

## 2022-10-14 PROCEDURE — 99999 PR PBB SHADOW E&M-EST. PATIENT-LVL III: CPT | Mod: PBBFAC,,, | Performed by: OPTOMETRIST

## 2022-10-14 PROCEDURE — 92014 PR EYE EXAM, EST PATIENT,COMPREHESV: ICD-10-PCS | Mod: S$PBB,,, | Performed by: OPTOMETRIST

## 2022-10-14 NOTE — PROGRESS NOTES
HPI    Pt here for annual eye exam DLS- 10/13/21    Pt states she is not having any va changes, very rarely uses specs.  Denies DM and HTN. Pt denies F.F and GTTS.   Last edited by Stephy Dunn on 10/14/2022 10:42 AM.            Assessment /Plan     For exam results, see Encounter Report.    Nuclear sclerosis, bilateral      Educated pt on presence of cataracts and effects on vision. No surgery at this time. Recheck in one year.

## 2022-10-19 ENCOUNTER — OUTPATIENT CASE MANAGEMENT (OUTPATIENT)
Dept: ADMINISTRATIVE | Facility: OTHER | Age: 72
End: 2022-10-19
Payer: MEDICARE

## 2022-10-19 NOTE — PROGRESS NOTES
Attempt #:  1  This LCSW attempted to reach patient/caregiver to provide resource and left a message requesting a return call.

## 2022-10-20 NOTE — PROGRESS NOTES
Outpatient Care Management   - Low Risk Patient Assessment    Patient: Cindy Escobedo  MRN:  [id number]  Date of Service:  10/20/2022  Completed by:  Mary Lou Sanchez LCSW  Referral Date: 10/10/2022    Reason for Visit   Patient presents with    OPCM SW First Assessment Attempt     10/19/2022    Social Work Assessment - Low/Mod Risk    Plan Of Care     Brief Summary: Sw received referral for patient from Neurologist, Dr. Yin. Sw completed low risk assessment with Pt's spouse/POA via telephone. Pt resides with her  and requires supervision and direction with completing daily activities. Pt does not currently use assistive devices to ambulate. Pt's spouse provides transportation for Pt. Pt's spouse denied Pt/family  difficulty affording food, housing, or medical expenses. Spouse reports Pt requires support around the clock which limits his ability to leave the home and run errands without patient. He reports other family members provide limited assistance with Pt's care. Pt's spouse is interested in resources to stay with Pt when he needs to go out. He reports their monthly household income is over $4000 per month therefore Pt is ineligible for programs offered through [place] Options in LTC (including Waiver and LTC-PCS). Pt's spouse provided with alternates for obtaining additional support. Sw offered to send patient resources for list of private hire caregivers. Pt's spouse expressed interest in caregiver support groups in the community and Sw discussed programs providing this service. Pt's spouse requested for Sw to send discussed resources to pt's e-mail address on file: [email or web address]. No other needs/concerns identified by Pt's spouse at this time. Case closed.    Patient Summary     OPCM Social Work Assessment (Low/Moderate Risk)    General  Level of Caregiver support: Caregiver currently provides assistance  Have you had to make a decision between paying for any of the following in the  last 2 months?: None  Transportation means: Family  Employment status: Retired and not working  Do you have any of the following?: Healthcare proxy  Current symptoms: Memory problems, Confusion, Difficulty processing information  Assessments  Was the PHQ Depression Screening completed this visit?: No (Comment: Assessment completed with Pt's spouse)  Was the AJ-7 Screening completed this visit?: No         Complex Care Plan     Care plan was discussed and completed today with input from patient and/or caregiver.    Patient Instructions     No follow-ups on file.    Todays OPCM Self-Management Care Plan was developed with the patients/caregivers input and was based on identified barriers from todays assessment.  Goals were written today with the patient/caregiver and the patient has agreed to work towards these goals to improve his/her overall well-being. Patient verbalized understanding of the care plan, goals, and all of today's instructions. Encouraged patient/caregiver to communicate with his/her physician and health care team about health conditions and the treatment plan.  Provided my contact information today and encouraged patient/caregiver to call me with any questions as needed.

## 2022-12-05 ENCOUNTER — OFFICE VISIT (OUTPATIENT)
Dept: CARDIOLOGY | Facility: CLINIC | Age: 72
End: 2022-12-05
Payer: MEDICARE

## 2022-12-05 VITALS
DIASTOLIC BLOOD PRESSURE: 82 MMHG | HEART RATE: 58 BPM | BODY MASS INDEX: 27.74 KG/M2 | HEIGHT: 60 IN | SYSTOLIC BLOOD PRESSURE: 139 MMHG | WEIGHT: 141.31 LBS

## 2022-12-05 DIAGNOSIS — I65.21 ASYMPTOMATIC STENOSIS OF RIGHT CAROTID ARTERY: Chronic | ICD-10-CM

## 2022-12-05 DIAGNOSIS — E78.2 MIXED HYPERLIPIDEMIA: ICD-10-CM

## 2022-12-05 DIAGNOSIS — I70.0 ATHEROSCLEROSIS OF AORTA: ICD-10-CM

## 2022-12-05 DIAGNOSIS — Z98.890 H/O CAROTID ENDARTERECTOMY: ICD-10-CM

## 2022-12-05 DIAGNOSIS — I10 ESSENTIAL HYPERTENSION: Chronic | ICD-10-CM

## 2022-12-05 DIAGNOSIS — K21.9 GASTROESOPHAGEAL REFLUX DISEASE, UNSPECIFIED WHETHER ESOPHAGITIS PRESENT: ICD-10-CM

## 2022-12-05 DIAGNOSIS — R00.2 PALPITATIONS: Primary | Chronic | ICD-10-CM

## 2022-12-05 DIAGNOSIS — E78.5 DYSLIPIDEMIA: Chronic | ICD-10-CM

## 2022-12-05 DIAGNOSIS — N18.31 STAGE 3A CHRONIC KIDNEY DISEASE: ICD-10-CM

## 2022-12-05 PROCEDURE — 99213 OFFICE O/P EST LOW 20 MIN: CPT | Mod: PBBFAC,PO | Performed by: INTERNAL MEDICINE

## 2022-12-05 PROCEDURE — 99999 PR PBB SHADOW E&M-EST. PATIENT-LVL III: CPT | Mod: PBBFAC,,, | Performed by: INTERNAL MEDICINE

## 2022-12-05 PROCEDURE — 99214 PR OFFICE/OUTPT VISIT, EST, LEVL IV, 30-39 MIN: ICD-10-PCS | Mod: S$PBB,,, | Performed by: INTERNAL MEDICINE

## 2022-12-05 PROCEDURE — 99999 PR PBB SHADOW E&M-EST. PATIENT-LVL III: ICD-10-PCS | Mod: PBBFAC,,, | Performed by: INTERNAL MEDICINE

## 2022-12-05 PROCEDURE — 99214 OFFICE O/P EST MOD 30 MIN: CPT | Mod: S$PBB,,, | Performed by: INTERNAL MEDICINE

## 2022-12-05 NOTE — PROGRESS NOTES
"Subjective:    Patient ID:  Cindy Escobedo is a 72 y.o. female patient here for evaluation Follow-up (6mo)      History of Present Illness:  Six-month cardiology follow-up.  Known peripheral arterial disease status post remote right CEA 2020.  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  CTA performed 3/22, stable appearance.  No chest pain shortness of breath PND orthopnea.  Mildly progressive dementia.  Patient followed by primary care labs and exam tentatively scheduled for February of next year.    Complains of dyspnea after mild exertional activities.  Remote past history of anemia with blood transfusions none recent.             Review of patient's allergies indicates:   Allergen Reactions    Codeine Anaphylaxis    Ace inhibitors      Other reaction(s): cough    Cat hair extract      Other reaction(s): Sneezing  Other reaction(s): Rash    Epinephrine      Other reaction(s): Tachycardia  Other reaction(s): Tachycardia    Hydrocodone-acetaminophen      Other reaction(s): Anaphylaxis  Other reaction(s): Anaphylaxis    Lipitor [atorvastatin]      Myalgias      Oxycodone hcl-oxycodone-asa      Other reaction(s): Agitation  Other reaction(s): Agitation    Oxytetracycline      Other reaction(s): Fainting  Other reaction(s): Fainting    Rosuvastatin      Other reaction(s): Muscle pain  Other reaction(s): Muscle pain    Sulfamethoxazole-trimethoprim      Other reaction(s): "Cracked lips"  Other reaction(s): "Cracked lips"       Past Medical History:   Diagnosis Date    Allergy     Anticoagulant long-term use     Carotid arterial disease     mild in 2012    Cataract     Depression     Diverticular disease     noted on cscope    Encounter for blood transfusion     Fatty liver     Frontotemporal dementia     language variant    H/O esophagogastroduodenoscopy     last 8/17    HEARING LOSS     History of esophagitis     noted on 8/17 EGD    History of gastritis     noted on EGD    HTN " (hypertension)     Hyperlipidemia     LFT elevation     US  with fatty liver    FARA (obstructive sleep apnea)     no CPAP    Osteoarthritis     elevated CRP    Palpitations 2012    05/10/2001 all coronaries normal;      Prediabetes     Primary progressive aphasia     S/P colonoscopy     3/19 repeat 3/29    Schatzki's ring     s/p dilation     TIA (transient ischemic attack)     Valvular heart disease     mod CT     Vertebral artery aneurysm 2013 angio 4.5 millimeter diameter fusiform left V4 segment aneurysm Minimal intracranial atherosclerosis      Past Surgical History:   Procedure Laterality Date    APPENDECTOMY      BLADDER SUSPENSION      BREAST BIOPSY Left     neg    CAROTID ENDARTERECTOMY Right 2020    Procedure: ENDARTERECTOMY-CAROTID;  Surgeon: Melisa Hudson MD;  Location: [facility] OR;  Service: Cardiovascular;  Laterality: Right;    CARPAL TUNNEL RELEASE      right    CERVICAL FUSION      seen Dr. Raymond patricia removed-RUL      CHOLECYSTECTOMY      DILATION AND CURETTAGE OF UTERUS      esophageal hernia repair      ESOPHAGUS SURGERY      HYSTERECTOMY      due to excessive bleeding    JOINT REPLACEMENT      partial left knee    KNEE ARTHROSCOPY      left    SINUS SURGERY      TONSILLECTOMY, ADENOIDECTOMY, BILATERAL MYRINGOTOMY AND TUBES      UVULOPALATOPHARYNGOPLASTY AND SEPTOPLASTY      VAGINAL DELIVERY      times 2     Social History     Tobacco Use    Smoking status: Former     Types: Cigarettes     Quit date: 1974     Years since quittin.4    Smokeless tobacco: Never    Tobacco comments:     quit in high school   Substance Use Topics    Alcohol use: No    Drug use: No        Review of Systems:    As noted in HPI in addition      REVIEW OF SYSTEMS  Review of Systems   Constitutional: Negative for decreased appetite, diaphoresis, night sweats, weight gain and weight loss.   HENT:  Negative for nosebleeds and odynophagia.    Eyes:  Negative for double  vision and photophobia.   Cardiovascular:  Negative for chest pain, claudication, cyanosis, dyspnea on exertion, irregular heartbeat, leg swelling, near-syncope, orthopnea, palpitations, paroxysmal nocturnal dyspnea and syncope.   Respiratory:  Negative for cough, hemoptysis, shortness of breath and wheezing.    Hematologic/Lymphatic: Negative for adenopathy.   Skin:  Negative for flushing, skin cancer and suspicious lesions.   Musculoskeletal:  Negative for gout, myalgias and neck pain.   Gastrointestinal:  Negative for abdominal pain, heartburn, hematemesis and hematochezia.   Genitourinary:  Negative for bladder incontinence, hesitancy and nocturia.   Neurological:  Negative for focal weakness, headaches, light-headedness and paresthesias.   Psychiatric/Behavioral:  Negative for memory loss and substance abuse.             Objective:        Vitals:    12/05/22 1412   BP: 139/82   Pulse: (!) 58       Lab Results   Component Value Date    WBC 5.93 03/09/2022    HGB 14.1 03/09/2022    HCT 41.9 03/09/2022     03/09/2022    CHOL 166 03/09/2022    TRIG 113 03/09/2022    HDL 37 (L) 03/09/2022    ALT 31 03/09/2022    AST 25 03/09/2022     03/09/2022    K 3.9 03/09/2022     03/09/2022    CREATININE 1.1 03/09/2022    BUN 25 (H) 03/09/2022    CO2 25 03/09/2022    TSH 3.122 09/10/2020    INR 1.0 08/14/2020    HGBA1C 5.5 06/08/2020        ECHOCARDIOGRAM RESULTS  Results for orders placed in visit on 06/08/20    Transthoracic echo (TTE) 2D with Color Flow    Interpretation Summary  · Normal left ventricular systolic function. The estimated ejection fraction is 55%.  · Normal LV diastolic function.  · Intermediate central venous pressure (8 mmHg).  · The estimated PA systolic pressure is 39 mmHg.        CURRENT/PREVIOUS VISIT EKG  Results for orders placed or performed during the hospital encounter of 08/14/20   EKG 12-LEAD    Collection Time: 08/14/20 10:24 AM    Narrative    Test Reason : I65.21,    Vent.  Rate : 051 BPM     Atrial Rate : 051 BPM     P-R Int : 138 ms          QRS Dur : 084 ms      QT Int : 444 ms       P-R-T Axes : 034 045 018 degrees     QTc Int : 409 ms    Sinus bradycardia  Nonspecific T wave abnormality  Abnormal ECG  When compared with ECG of 07-JUN-2013 10:30,  Nonspecific T wave abnormality now evident in Anterior-lateral leads  Confirmed by Manjinder DOHERTY MD, Allen VILLEGAS ([id number]) on 8/17/2020 6:40:32 AM    Referred By: NATALIYA PEREZ           Confirmed By:Allen Dunbar III, MD     No valid procedures specified.   No results found for this or any previous visit.    No valid procedures specified.    PHYSICAL EXAM  CONSTITUTIONAL: Well built, well nourished in no apparent distress  NECK: no carotid bruit, no JVD  LUNGS: CTA  CHEST WALL: no tenderness,  HEART: regular rate and rhythm, S1, S2 normal, no murmur, click, rub or gallop   ABDOMEN: soft, non-tender; bowel sounds normal; no masses,  no organomegaly  EXTREMITIES: Extremities normal, no edema, no calf tenderness noted  NEURO: AAO X 3    I HAVE REVIEWED :    The vital signs, nurses notes, and all the pertinent radiology and labs.         Current Outpatient Medications   Medication Instructions    aspirin (ECOTRIN) 81 mg, Oral, Daily    atenoloL (TENORMIN) 25 MG tablet TAKE 1 TABLET BY MOUTH EVERY EVENING.    cetirizine 10 mg, Oral, Daily, DO NOT TAKE MORNING OF SURGERY    fenofibric acid (FIBRICOR) 45 mg, Oral, Daily    LIVALO 2 mg Tab tablet TAKE 1 TABLET(2 MG) BY MOUTH EVERY EVENING    multivitamin (THERAGRAN) per tablet 1 tablet, Oral, Daily, DO NOT TAKE MORNING OF SURGERY    sertraline (ZOLOFT) 100 MG tablet TAKE 1 TABLET(100 MG) BY MOUTH EVERY DAY    valsartan-hydrochlorothiazide (DIOVAN-HCT) 320-12.5 mg per tablet 1 tablet, Oral, Every morning    vitamin D (VITAMIN D3) 1,000 Units, Oral, Daily    zolpidem (AMBIEN) 5 mg, Oral, Nightly PRN, TAKE NIGHT BEFORE SURGERY IF NEEDED          Assessment:   Right carotid endarterectomy 2020, history of  vertebral artery aneurysm.  Imaging 3/22 stable by CTA.  Hypertension  Dyslipidemia      Plan:   Medications reviewed and reconciled, recommend no change.  Labs and follow-up primary care in February 2023.    Six months return to clinic.      No follow-ups on file.

## 2023-01-05 DIAGNOSIS — I10 ESSENTIAL HYPERTENSION: Primary | ICD-10-CM

## 2023-01-05 RX ORDER — VALSARTAN AND HYDROCHLOROTHIAZIDE 320; 12.5 MG/1; MG/1
1 TABLET, FILM COATED ORAL EVERY MORNING
Qty: 90 TABLET | Refills: 4 | Status: SHIPPED | OUTPATIENT
Start: 2023-01-05 | End: 2024-01-31

## 2023-02-20 ENCOUNTER — OFFICE VISIT (OUTPATIENT)
Dept: FAMILY MEDICINE | Facility: CLINIC | Age: 73
End: 2023-02-20
Payer: MEDICARE

## 2023-02-20 VITALS
WEIGHT: 140.63 LBS | OXYGEN SATURATION: 98 % | DIASTOLIC BLOOD PRESSURE: 72 MMHG | HEIGHT: 60 IN | RESPIRATION RATE: 18 BRPM | BODY MASS INDEX: 27.61 KG/M2 | HEART RATE: 54 BPM | SYSTOLIC BLOOD PRESSURE: 124 MMHG | TEMPERATURE: 98 F

## 2023-02-20 DIAGNOSIS — F03.918 DEMENTIA WITH BEHAVIORAL DISTURBANCE: ICD-10-CM

## 2023-02-20 DIAGNOSIS — I70.0 ATHEROSCLEROSIS OF AORTA: ICD-10-CM

## 2023-02-20 DIAGNOSIS — F33.1 MAJOR DEPRESSIVE DISORDER, RECURRENT, MODERATE: ICD-10-CM

## 2023-02-20 DIAGNOSIS — I10 HYPERTENSION, ESSENTIAL: Primary | ICD-10-CM

## 2023-02-20 DIAGNOSIS — E78.5 HYPERLIPIDEMIA, UNSPECIFIED HYPERLIPIDEMIA TYPE: ICD-10-CM

## 2023-02-20 DIAGNOSIS — N18.31 STAGE 3A CHRONIC KIDNEY DISEASE: ICD-10-CM

## 2023-02-20 DIAGNOSIS — R79.9 ABNORMAL FINDING OF BLOOD CHEMISTRY, UNSPECIFIED: ICD-10-CM

## 2023-02-20 PROCEDURE — 99214 OFFICE O/P EST MOD 30 MIN: CPT | Mod: S$GLB,,, | Performed by: FAMILY MEDICINE

## 2023-02-20 PROCEDURE — 99214 PR OFFICE/OUTPT VISIT, EST, LEVL IV, 30-39 MIN: ICD-10-PCS | Mod: S$GLB,,, | Performed by: FAMILY MEDICINE

## 2023-02-20 NOTE — PROGRESS NOTES
Subjective:       Patient ID: Cindy Escobedo is a 73 y.o. female.    Chief Complaint: Annual Exam    HPI  The patient is a 73-year-old with frontotemporal dementia who is here today with her  for her yearly visit.  Her  tells me that physically she is fine but cognitively she is slowing down.      Today we discussed the followin) frontotemporal dementia    She has established with a new neurologist, Dr. Yin, since her prior neurologist retired.  She did go to speech therapy which helped her  learn some techniques when trying to communicate with her.  She continues to be very challenged in her daily activities because of her dementia and communication issues.  2) hypertension.  Today blood pressure is 142/72.  She is taking Diovan HCT.  She is due for labs next month  3) depression.  Emotionally she has been doing fairly well.  For now, the Zoloft seems to be helping.  4) hyperlipidemia.  She is taking her fenofibrate and Livalo which she is tolerating well.  She is due for labs in January      Review of Systems   Constitutional:  Negative for appetite change, chills, diaphoresis, fatigue, fever and unexpected weight change.   HENT:  Negative for congestion, dental problem, ear pain, hearing loss, postnasal drip, rhinorrhea, sneezing, sore throat and trouble swallowing.    Eyes:  Negative for photophobia, pain, discharge and visual disturbance.   Respiratory:  Negative for cough, chest tightness, shortness of breath and wheezing.    Cardiovascular:  Negative for chest pain, palpitations and leg swelling.   Gastrointestinal:  Negative for abdominal distention, abdominal pain, blood in stool, constipation, diarrhea, nausea and vomiting.   Endocrine: Negative for cold intolerance, heat intolerance, polydipsia and polyuria.   Genitourinary:  Negative for dysuria, flank pain, frequency, genital sores, hematuria, menstrual problem and vaginal discharge.   Musculoskeletal:  Negative for  arthralgias, joint swelling and myalgias.   Skin:  Negative for rash.   Neurological:  Negative for dizziness, syncope, light-headedness and headaches.   Hematological:  Negative for adenopathy. Does not bruise/bleed easily.   Psychiatric/Behavioral:  Negative for dysphoric mood, self-injury, sleep disturbance and suicidal ideas. The patient is not nervous/anxious.        Objective:      Physical Exam  Constitutional:       General: She is not in acute distress.     Appearance: Normal appearance. She is well-developed.   HENT:      Head: Normocephalic and atraumatic.      Right Ear: Hearing, tympanic membrane, ear canal and external ear normal.      Left Ear: Hearing, tympanic membrane, ear canal and external ear normal.      Nose: Nose normal.      Mouth/Throat:      Mouth: No oral lesions.      Pharynx: No oropharyngeal exudate or posterior oropharyngeal erythema.   Eyes:      General: Lids are normal. No scleral icterus.     Extraocular Movements: Extraocular movements intact.      Conjunctiva/sclera: Conjunctivae normal.      Pupils: Pupils are equal, round, and reactive to light.   Neck:      Thyroid: No thyroid mass or thyromegaly.      Vascular: No carotid bruit.   Cardiovascular:      Rate and Rhythm: Normal rate and regular rhythm. No extrasystoles are present.     Chest Wall: PMI is not displaced.      Heart sounds: Normal heart sounds. No murmur heard.    No gallop.   Pulmonary:      Effort: Pulmonary effort is normal. No accessory muscle usage or respiratory distress.      Breath sounds: Normal breath sounds.   Abdominal:      General: Bowel sounds are normal. There is no abdominal bruit.      Palpations: Abdomen is soft.      Tenderness: There is no abdominal tenderness. There is no rebound.   Musculoskeletal:      Cervical back: Normal range of motion and neck supple.   Lymphadenopathy:      Head:      Right side of head: No submental or submandibular adenopathy.      Left side of head: No submental or  submandibular adenopathy.      Cervical:      Right cervical: No superficial, deep or posterior cervical adenopathy.     Left cervical: No superficial, deep or posterior cervical adenopathy.      Upper Body:      Right upper body: No supraclavicular adenopathy.      Left upper body: No supraclavicular adenopathy.   Skin:     General: Skin is warm and dry.   Neurological:      Mental Status: She is alert.      Cranial Nerves: No cranial nerve deficit.      Sensory: No sensory deficit.   Psychiatric:         Speech: Speech normal.         Behavior: Behavior normal.         Thought Content: Thought content normal.     Blood pressure 124/72, pulse (!) 54, temperature 98.4 °F (36.9 °C), temperature source Temporal, resp. rate 18, height 5' (1.524 m), weight 63.8 kg (140 lb 10.5 oz), SpO2 98 %.Body mass index is 27.47 kg/m².            A/P:  1)   frontotemporal dementia    progressive.  Follow-up with neurology   2) hypertension.  Well controlled.  Continue with Diovan HCT  3) depression.  Well controlled.  Continue Zoloft .  4) hyperlipidemia.  Previously well controlled.  Continue with fenofibrate and Livalo.  We will check labs in March  5) coronary artery disease.  Asymptomatic.  Follow up with Cardiology   6)  Aortic atherosclerosis.  Asymptomatic   7)  CKD stage 3.  Stable      As long as she does well, I will see her back in 1 year or sooner if needed

## 2023-03-17 ENCOUNTER — HOSPITAL ENCOUNTER (OUTPATIENT)
Dept: RADIOLOGY | Facility: HOSPITAL | Age: 73
Discharge: HOME | End: 2023-03-17
Attending: FAMILY MEDICINE
Payer: MEDICARE

## 2023-03-17 DIAGNOSIS — Z12.31 ENCOUNTER FOR SCREENING MAMMOGRAM FOR MALIGNANT NEOPLASM OF BREAST: ICD-10-CM

## 2023-03-17 PROCEDURE — 77063 MAMMO DIGITAL SCREENING BILAT WITH TOMO: ICD-10-PCS | Mod: 26,,, | Performed by: RADIOLOGY

## 2023-03-17 PROCEDURE — 77063 BREAST TOMOSYNTHESIS BI: CPT | Mod: 26,,, | Performed by: RADIOLOGY

## 2023-03-17 PROCEDURE — 77067 SCR MAMMO BI INCL CAD: CPT | Mod: TC,PO

## 2023-03-17 PROCEDURE — 77067 SCR MAMMO BI INCL CAD: CPT | Mod: 26,,, | Performed by: RADIOLOGY

## 2023-03-17 PROCEDURE — 77067 MAMMO DIGITAL SCREENING BILAT WITH TOMO: ICD-10-PCS | Mod: 26,,, | Performed by: RADIOLOGY

## 2023-05-08 ENCOUNTER — TELEPHONE (OUTPATIENT)
Dept: FAMILY MEDICINE | Facility: CLINIC | Age: 73
End: 2023-05-08
Payer: MEDICARE

## 2023-05-08 DIAGNOSIS — F03.90 DEMENTIA, UNSPECIFIED DEMENTIA SEVERITY, UNSPECIFIED DEMENTIA TYPE, UNSPECIFIED WHETHER BEHAVIORAL, PSYCHOTIC, OR MOOD DISTURBANCE OR ANXIETY: Primary | ICD-10-CM

## 2023-05-08 NOTE — TELEPHONE ENCOUNTER
[facility] AND [facility]  [facility] Cardiology Progress Note    Delmi Keane Patient Status:  Inpatient    3/1/1925 MRN [id number]   Location [facility] 3W/SW Attending Nelly Stauffer MD   Hosp Day # 1 PCP Tatyana Madera MD     80year old female, consult Pt's  called and advised that patient's dementia is worsening.  He is requesting a home health evaluation.  Prefers to stay within [facility] system.    date: TOTAL HIP REPLACEMENT      Comment: Bilateral  No date: WRIST FRACTURE SURGERY Right  No family history on file. reports that she quit smoking about 48 years ago. Her smoking use included Cigarettes.  She has never used smokeless tobacco. She report

## 2023-05-10 NOTE — TELEPHONE ENCOUNTER
Spoke with pt's  re: HH referral has been placed. He should be receiving a call in the next day or so to schedule an appt.   verbalized understanding

## 2023-05-11 PROCEDURE — G0180 PR HOME HEALTH MD CERTIFICATION: ICD-10-PCS | Mod: ,,, | Performed by: FAMILY MEDICINE

## 2023-05-11 PROCEDURE — G0180 MD CERTIFICATION HHA PATIENT: HCPCS | Mod: ,,, | Performed by: FAMILY MEDICINE

## 2023-05-18 ENCOUNTER — OFFICE VISIT (OUTPATIENT)
Dept: GASTROENTEROLOGY | Facility: CLINIC | Age: 73
End: 2023-05-18
Payer: MEDICARE

## 2023-05-18 VITALS — HEIGHT: 60 IN | WEIGHT: 138.88 LBS | BODY MASS INDEX: 27.26 KG/M2

## 2023-05-18 DIAGNOSIS — K57.90 DIVERTICULOSIS: ICD-10-CM

## 2023-05-18 DIAGNOSIS — Z87.19 HISTORY OF DIVERTICULITIS: Primary | ICD-10-CM

## 2023-05-18 DIAGNOSIS — G30.9 ALZHEIMER'S DEMENTIA WITHOUT BEHAVIORAL DISTURBANCE: ICD-10-CM

## 2023-05-18 DIAGNOSIS — G31.01 PRIMARY PROGRESSIVE APHASIA: ICD-10-CM

## 2023-05-18 DIAGNOSIS — F02.80 ALZHEIMER'S DEMENTIA WITHOUT BEHAVIORAL DISTURBANCE: ICD-10-CM

## 2023-05-18 DIAGNOSIS — F02.80 PRIMARY PROGRESSIVE APHASIA: ICD-10-CM

## 2023-05-18 DIAGNOSIS — K21.9 GASTROESOPHAGEAL REFLUX DISEASE, UNSPECIFIED WHETHER ESOPHAGITIS PRESENT: ICD-10-CM

## 2023-05-18 PROCEDURE — 99213 OFFICE O/P EST LOW 20 MIN: CPT | Mod: S$PBB,,, | Performed by: INTERNAL MEDICINE

## 2023-05-18 PROCEDURE — 99213 PR OFFICE/OUTPT VISIT, EST, LEVL III, 20-29 MIN: ICD-10-PCS | Mod: S$PBB,,, | Performed by: INTERNAL MEDICINE

## 2023-05-18 PROCEDURE — 99213 OFFICE O/P EST LOW 20 MIN: CPT | Mod: PBBFAC,PO | Performed by: INTERNAL MEDICINE

## 2023-05-18 PROCEDURE — 99999 PR PBB SHADOW E&M-EST. PATIENT-LVL III: ICD-10-PCS | Mod: PBBFAC,,, | Performed by: INTERNAL MEDICINE

## 2023-05-18 PROCEDURE — 99999 PR PBB SHADOW E&M-EST. PATIENT-LVL III: CPT | Mod: PBBFAC,,, | Performed by: INTERNAL MEDICINE

## 2023-05-22 NOTE — PROGRESS NOTES
"Subjective     Patient ID: Cindy Escobedo is a 73 y.o. female.    Chief Complaint: Gastroesophageal Reflux    Ms. Escobedo is in with her  today ( Mr. Henderson) who is her primary caregiver.  Other than her dementia, she is doing well.  She is had no more attacks of diverticulitis.  However, she is wearing depends now ( she does not remember to always wipe after bowel movement).  And according to Mr. Henderson, her "metabolism" has increased.  Whenever she eats, within 1-2 hours she has to go to the bathroom.  He reports her appetite is good.  Denies any trouble swallowing or postprandial pain.  No nausea or vomiting.  No diarrhea.  Her last colonoscopy was 03/09/2015.    Review of Systems   Constitutional:  Negative for appetite change, fever and unexpected weight change.   HENT: Negative.  Negative for mouth sores, sore throat and trouble swallowing.    Eyes: Negative.    Respiratory: Negative.  Negative for cough and choking.    Cardiovascular: Negative.  Negative for chest pain and leg swelling.   Gastrointestinal:  Negative for abdominal distention, abdominal pain, anal bleeding, blood in stool, constipation, diarrhea, nausea and vomiting.   Genitourinary: Negative.    Musculoskeletal: Negative.    Integumentary:  Negative for color change and rash. Negative.   Neurological: Negative.  Positive for memory loss.   Hematological:  Negative for adenopathy.   Psychiatric/Behavioral: Negative.          Objective     Physical Exam  Vitals and nursing note reviewed.   Constitutional:       General: She is not in acute distress.     Appearance: She is well-developed.   HENT:      Mouth/Throat:      Mouth: Mucous membranes are moist.      Pharynx: No oropharyngeal exudate.   Eyes:      General: No scleral icterus.  Neck:      Thyroid: No thyromegaly.      Trachea: No tracheal deviation.   Cardiovascular:      Rate and Rhythm: Normal rate and regular rhythm.      Heart sounds: Normal heart sounds.   Pulmonary:      Effort: " Pulmonary effort is normal.      Breath sounds: Normal breath sounds.   Abdominal:      General: Bowel sounds are normal. There is no distension.      Palpations: Abdomen is soft. There is no mass.      Tenderness: There is no abdominal tenderness. There is no guarding.   Lymphadenopathy:      Cervical: No cervical adenopathy.   Skin:     General: Skin is warm and dry.      Findings: No rash.   Neurological:      General: No focal deficit present.      Mental Status: She is alert. She is disoriented.   Psychiatric:         Mood and Affect: Mood normal.         Behavior: Behavior normal.          Assessment and Plan     History of diverticulitis    Diverticulosis    Gastroesophageal reflux disease, unspecified whether esophagitis present    Alzheimer's dementia without behavioral disturbance    Primary progressive aphasia        Continue meds the same.  We will hold on colonoscopy for now, as her  feels she would not be able to follow the prep.  RTC p.r.n..

## 2023-05-25 ENCOUNTER — EXTERNAL HOME HEALTH (OUTPATIENT)
Dept: HOME HEALTH SERVICES | Facility: HOSPITAL | Age: 73
End: 2023-05-25
Payer: MEDICARE

## 2023-06-01 ENCOUNTER — TELEPHONE (OUTPATIENT)
Dept: FAMILY MEDICINE | Facility: CLINIC | Age: 73
End: 2023-06-01
Payer: MEDICARE

## 2023-06-01 ENCOUNTER — PATIENT MESSAGE (OUTPATIENT)
Dept: FAMILY MEDICINE | Facility: CLINIC | Age: 73
End: 2023-06-01
Payer: MEDICARE

## 2023-06-01 NOTE — TELEPHONE ENCOUNTER
Jin for pt with call back number.       Returned call to Navid at [facility] and advised him that we received the message and would follow up with the patient.  He reports that they were walking outside of her home and she tripped falling into the grass.  Denied any injury at that time.

## 2023-06-01 NOTE — TELEPHONE ENCOUNTER
----- Message from Elle Rothman sent at 6/1/2023  8:51 AM CDT -----  Regarding: pt call back  Name of Who is Calling: Navid with [facility]         What is the request in detail: Pt had a fall yesterday afternoon during ct visit, she tripped in yard, he helped her back up, no apparent injury, just wanted to make the provider aware of it. Please advise.         Can the clinic reply by [facility]: no           What Number to Call Back if not in [facility]: [phone]

## 2023-06-05 ENCOUNTER — OFFICE VISIT (OUTPATIENT)
Dept: CARDIOLOGY | Facility: CLINIC | Age: 73
End: 2023-06-05
Payer: MEDICARE

## 2023-06-05 VITALS
WEIGHT: 143.31 LBS | DIASTOLIC BLOOD PRESSURE: 77 MMHG | SYSTOLIC BLOOD PRESSURE: 126 MMHG | HEART RATE: 55 BPM | HEIGHT: 60 IN | BODY MASS INDEX: 28.13 KG/M2

## 2023-06-05 DIAGNOSIS — I70.0 ATHEROSCLEROSIS OF AORTA: ICD-10-CM

## 2023-06-05 DIAGNOSIS — F33.1 MAJOR DEPRESSIVE DISORDER, RECURRENT, MODERATE: ICD-10-CM

## 2023-06-05 DIAGNOSIS — I10 ESSENTIAL HYPERTENSION: Chronic | ICD-10-CM

## 2023-06-05 DIAGNOSIS — I72.6 VERTEBRAL ARTERY ANEURYSM: Primary | ICD-10-CM

## 2023-06-05 DIAGNOSIS — F02.80 PRIMARY PROGRESSIVE APHASIA: ICD-10-CM

## 2023-06-05 DIAGNOSIS — F02.80 ALZHEIMER'S DEMENTIA WITHOUT BEHAVIORAL DISTURBANCE: ICD-10-CM

## 2023-06-05 DIAGNOSIS — G31.01 PRIMARY PROGRESSIVE APHASIA: ICD-10-CM

## 2023-06-05 DIAGNOSIS — E78.5 DYSLIPIDEMIA: Chronic | ICD-10-CM

## 2023-06-05 DIAGNOSIS — F03.918 DEMENTIA WITH BEHAVIORAL DISTURBANCE: ICD-10-CM

## 2023-06-05 DIAGNOSIS — R41.3 MEMORY LOSS: ICD-10-CM

## 2023-06-05 DIAGNOSIS — G30.9 ALZHEIMER'S DEMENTIA WITHOUT BEHAVIORAL DISTURBANCE: ICD-10-CM

## 2023-06-05 PROCEDURE — 99214 PR OFFICE/OUTPT VISIT, EST, LEVL IV, 30-39 MIN: ICD-10-PCS | Mod: S$PBB,,, | Performed by: INTERNAL MEDICINE

## 2023-06-05 PROCEDURE — 99999 PR PBB SHADOW E&M-EST. PATIENT-LVL III: CPT | Mod: PBBFAC,,, | Performed by: INTERNAL MEDICINE

## 2023-06-05 PROCEDURE — 99999 PR PBB SHADOW E&M-EST. PATIENT-LVL III: ICD-10-PCS | Mod: PBBFAC,,, | Performed by: INTERNAL MEDICINE

## 2023-06-05 PROCEDURE — 99213 OFFICE O/P EST LOW 20 MIN: CPT | Mod: PBBFAC,PO | Performed by: INTERNAL MEDICINE

## 2023-06-05 PROCEDURE — 99214 OFFICE O/P EST MOD 30 MIN: CPT | Mod: S$PBB,,, | Performed by: INTERNAL MEDICINE

## 2023-06-05 NOTE — PROGRESS NOTES
"Subjective:    Patient ID:  Cindy Escobedo is a 73 y.o. female patient here for evaluation Follow-up      History of Present Illness:  Cardiology follow-up.  History of peripheral arterial disease.  CEA 2020, concomitant problems include left vertebral artery aneurysm found at the time of CEA.  CTA performed 03/2022 stable.  Progressive cognitive decline in ability.  Home health evaluation in progress.  No angina no significant dyspnea.  No PND orthopnea.             Review of patient's allergies indicates:   Allergen Reactions    Codeine Anaphylaxis    Ace inhibitors      Other reaction(s): cough    Cat hair extract      Other reaction(s): Sneezing  Other reaction(s): Rash    Epinephrine      Other reaction(s): Tachycardia  Other reaction(s): Tachycardia    Hydrocodone-acetaminophen      Other reaction(s): Anaphylaxis  Other reaction(s): Anaphylaxis    Lipitor [atorvastatin]      Myalgias      Oxycodone hcl-oxycodone-asa      Other reaction(s): Agitation  Other reaction(s): Agitation    Oxytetracycline      Other reaction(s): Fainting  Other reaction(s): Fainting    Rosuvastatin      Other reaction(s): Muscle pain  Other reaction(s): Muscle pain    Sulfamethoxazole-trimethoprim      Other reaction(s): "Cracked lips"  Other reaction(s): "Cracked lips"       Past Medical History:   Diagnosis Date    Allergy     Anticoagulant long-term use     Carotid arterial disease     s/p CEA 2020    Cataract     Depression     Diverticular disease     noted on cscope    Encounter for blood transfusion     Fatty liver     Frontotemporal dementia     language variant    H/O esophagogastroduodenoscopy     last 8/17    HEARING LOSS     History of esophagitis     noted on 8/17 EGD    History of gastritis     noted on EGD    HTN (hypertension)     Hyperlipidemia     LFT elevation     US 1/18 with fatty liver    FARA (obstructive sleep apnea)     no CPAP    Osteoarthritis     elevated CRP    Palpitations 05/07/2012    05/10/2001 all " coronaries normal;      Prediabetes     Primary progressive aphasia     S/P colonoscopy     3/19 repeat 3/29    Schatzki's ring     s/p dilation     TIA (transient ischemic attack)     Valvular heart disease     mod NM     Vertebral artery aneurysm 2013 angio 4.5 millimeter diameter fusiform left V4 segment aneurysm Minimal intracranial atherosclerosis      Past Surgical History:   Procedure Laterality Date    APPENDECTOMY      BLADDER SUSPENSION      BREAST BIOPSY Left     neg    CAROTID ENDARTERECTOMY Right 2020    Procedure: ENDARTERECTOMY-CAROTID;  Surgeon: Melisa Hudson MD;  Location: [facility];  Service: Cardiovascular;  Laterality: Right;    CARPAL TUNNEL RELEASE      right    CERVICAL FUSION      seen Dr. Raymond patricia removed-RUL      CHOLECYSTECTOMY      DILATION AND CURETTAGE OF UTERUS      esophageal hernia repair      ESOPHAGUS SURGERY      HYSTERECTOMY      due to excessive bleeding    JOINT REPLACEMENT      partial left knee    KNEE ARTHROSCOPY      left    SINUS SURGERY      TONSILLECTOMY, ADENOIDECTOMY, BILATERAL MYRINGOTOMY AND TUBES      UVULOPALATOPHARYNGOPLASTY AND SEPTOPLASTY      VAGINAL DELIVERY      times 2     Social History     Tobacco Use    Smoking status: Former     Types: Cigarettes     Quit date: 1974     Years since quittin.9    Smokeless tobacco: Never    Tobacco comments:     quit in high school   Substance Use Topics    Alcohol use: No    Drug use: No        Review of Systems:    As noted in HPI in addition      REVIEW OF SYSTEMS  Review of Systems   Constitutional: Negative for decreased appetite, diaphoresis, night sweats, weight gain and weight loss.   HENT:  Negative for nosebleeds and odynophagia.    Eyes:  Negative for double vision and photophobia.   Cardiovascular:  Negative for chest pain, claudication, cyanosis, dyspnea on exertion, irregular heartbeat, leg swelling, near-syncope, orthopnea, palpitations, paroxysmal nocturnal  dyspnea and syncope.   Respiratory:  Negative for cough, hemoptysis, shortness of breath and wheezing.    Hematologic/Lymphatic: Negative for adenopathy.   Skin:  Negative for flushing, skin cancer and suspicious lesions.   Musculoskeletal:  Negative for gout, myalgias and neck pain.   Gastrointestinal:  Negative for abdominal pain, heartburn, hematemesis and hematochezia.   Genitourinary:  Negative for bladder incontinence, hesitancy and nocturia.   Neurological:  Negative for focal weakness, headaches, light-headedness and paresthesias.   Psychiatric/Behavioral:  Negative for memory loss and substance abuse.             Objective:        Vitals:    06/05/23 1112   BP: 126/77   Pulse: (!) 55       Lab Results   Component Value Date    WBC 8.85 03/17/2023    HGB 14.7 03/17/2023    HCT 42.2 03/17/2023     03/17/2023    CHOL 169 03/17/2023    TRIG 123 03/17/2023    HDL 40 03/17/2023    ALT 23 03/17/2023    AST 18 03/17/2023     03/17/2023    K 3.9 03/17/2023     03/17/2023    CREATININE 0.9 03/17/2023    BUN 16 03/17/2023    CO2 22 (L) 03/17/2023    TSH 2.944 03/17/2023    INR 1.0 08/14/2020    HGBA1C 5.9 (H) 03/17/2023        ECHOCARDIOGRAM RESULTS  Results for orders placed in visit on 06/08/20    Transthoracic echo (TTE) 2D with Color Flow    Interpretation Summary  · Normal left ventricular systolic function. The estimated ejection fraction is 55%.  · Normal LV diastolic function.  · Intermediate central venous pressure (8 mmHg).  · The estimated PA systolic pressure is 39 mmHg.        CURRENT/PREVIOUS VISIT EKG  Results for orders placed or performed during the hospital encounter of 08/14/20   EKG 12-LEAD    Collection Time: 08/14/20 10:24 AM    Narrative    Test Reason : I65.21,    Vent. Rate : 051 BPM     Atrial Rate : 051 BPM     P-R Int : 138 ms          QRS Dur : 084 ms      QT Int : 444 ms       P-R-T Axes : 034 045 018 degrees     QTc Int : 409 ms    Sinus bradycardia  Nonspecific T wave  abnormality  Abnormal ECG  When compared with ECG of 07-JUN-2013 10:30,  Nonspecific T wave abnormality now evident in Anterior-lateral leads  Confirmed by Manjinder DOHERTY MD, Allen VILLEGAS ([id number]) on 8/17/2020 6:40:32 AM    Referred By: NATALIYA PEREZ           Confirmed By:Allen Dunbar III, MD     No valid procedures specified.   No results found for this or any previous visit.    No valid procedures specified.    PHYSICAL EXAM  CONSTITUTIONAL: Well built, well nourished in no apparent distress  NECK: no carotid bruit, no JVD  LUNGS: CTA  CHEST WALL: no tenderness,  HEART: regular rate and rhythm, S1, S2 normal, no murmur, click, rub or gallop   ABDOMEN: soft, non-tender; bowel sounds normal; no masses,  no organomegaly  EXTREMITIES: Extremities normal, no edema, no calf tenderness noted  NEURO: AAO X 3    I HAVE REVIEWED :    The vital signs, nurses notes, and all the pertinent radiology and labs.         Current Outpatient Medications   Medication Instructions    aspirin (ECOTRIN) 81 mg, Oral, Daily    atenoloL (TENORMIN) 25 MG tablet TAKE 1 TABLET BY MOUTH EVERY EVENING.    cetirizine 10 mg, Oral, Daily, DO NOT TAKE MORNING OF SURGERY    fenofibric acid (FIBRICOR) 45 mg CpDR TAKE 1 CAPSULE(45 MG) BY MOUTH EVERY DAY    LIVALO 2 mg Tab tablet TAKE 1 TABLET(2 MG) BY MOUTH EVERY EVENING    multivitamin (THERAGRAN) per tablet 1 tablet, Oral, Daily, DO NOT TAKE MORNING OF SURGERY    sertraline (ZOLOFT) 100 MG tablet TAKE 1 TABLET(100 MG) BY MOUTH EVERY DAY    valsartan-hydrochlorothiazide (DIOVAN-HCT) 320-12.5 mg per tablet 1 tablet, Oral, Every morning    vitamin D (VITAMIN D3) 1,000 Units, Oral, Daily    zolpidem (AMBIEN) 5 mg, Oral, Nightly PRN, TAKE NIGHT BEFORE SURGERY IF NEEDED          Assessment:   Peripheral arterial disease.  Right carotid endarterectomy 2020, history vertebral artery aneurysm.  Stable imaging CTA 03/2022.    Hypertension, dyslipidemia    Progressive cognitive decline.  Home health evaluation  progress.        Plan:   Follow-up primary care  6 months.        No follow-ups on file.

## 2023-06-07 ENCOUNTER — DOCUMENT SCAN (OUTPATIENT)
Dept: HOME HEALTH SERVICES | Facility: HOSPITAL | Age: 73
End: 2023-06-07
Payer: MEDICARE

## 2023-06-13 ENCOUNTER — DOCUMENT SCAN (OUTPATIENT)
Dept: HOME HEALTH SERVICES | Facility: HOSPITAL | Age: 73
End: 2023-06-13
Payer: MEDICARE

## 2023-06-23 ENCOUNTER — DOCUMENT SCAN (OUTPATIENT)
Dept: HOME HEALTH SERVICES | Facility: HOSPITAL | Age: 73
End: 2023-06-23
Payer: MEDICARE

## 2023-07-05 RX ORDER — ZOLPIDEM TARTRATE 5 MG/1
5 TABLET ORAL NIGHTLY PRN
Qty: 30 TABLET | Refills: 0 | Status: SHIPPED | OUTPATIENT
Start: 2023-07-05

## 2023-07-05 NOTE — TELEPHONE ENCOUNTER
No care due was identified.  [facility] Embedded Care Due Messages. Reference number: [id number].   7/05/2023 4:39:25 PM CDT

## 2023-07-05 NOTE — TELEPHONE ENCOUNTER
----- Message from Trisha Peck sent at 7/5/2023  2:17 PM CDT -----  Contact: pt spouse  Type:  Needs Medical Advice    Who Called: Pt Spouse  Would the patient rather a call back or a response via [facility]? call  Best Call Back Number: [phone]  Additional Information: States that they need a callback as soon as possible. States that they need to speak with nurse about an Rx. Please advise thank you

## 2023-07-05 NOTE — TELEPHONE ENCOUNTER
Prescription was previously prescribed by another provider.  Pt's  would like to know if he can get a 30 day supply sent to pharmacy to be used as needed.  states she does not use it often.

## 2023-07-11 ENCOUNTER — DOCUMENT SCAN (OUTPATIENT)
Dept: HOME HEALTH SERVICES | Facility: HOSPITAL | Age: 73
End: 2023-07-11
Payer: MEDICARE

## 2023-07-13 ENCOUNTER — TELEPHONE (OUTPATIENT)
Dept: FAMILY MEDICINE | Facility: CLINIC | Age: 73
End: 2023-07-13
Payer: MEDICARE

## 2023-07-13 NOTE — TELEPHONE ENCOUNTER
----- Message from Berenice Nevarez sent at 7/13/2023  9:22 AM CDT -----  Contact: pt  Type: Needs Medical Advice  Who Called:  pt  Best Call Back Number:[phone]    Additional Information: Pt is calling to get a veterans affair form filled out.Please call back and advise.

## 2023-07-13 NOTE — TELEPHONE ENCOUNTER
Spoke to pt's .  He has a form from the [facility] that will supply funds for a sitter for his wife if needed.  Do they need an appt to get this filled out?  LOV 2/2023

## 2023-07-14 ENCOUNTER — PATIENT MESSAGE (OUTPATIENT)
Dept: FAMILY MEDICINE | Facility: CLINIC | Age: 73
End: 2023-07-14
Payer: MEDICARE

## 2023-07-17 ENCOUNTER — TELEPHONE (OUTPATIENT)
Dept: FAMILY MEDICINE | Facility: CLINIC | Age: 73
End: 2023-07-17
Payer: MEDICARE

## 2023-07-21 ENCOUNTER — TELEPHONE (OUTPATIENT)
Dept: FAMILY MEDICINE | Facility: CLINIC | Age: 73
End: 2023-07-21
Payer: MEDICARE

## 2023-07-21 NOTE — TELEPHONE ENCOUNTER
Called and advised pt's  that paperwork should be ready today and will call as soon as it is completed.

## 2023-07-21 NOTE — TELEPHONE ENCOUNTER
----- Message from Yari Heredia sent at 7/20/2023  1:13 PM CDT -----  Contact: [phone]  walk in  Pt's spouse inquiring about paperwork to be filled out he dropped off Monday.  States he received a call on Tuesday with questions and have not heard back since.  Checking status.  Pls call to advise.

## 2023-07-25 RX ORDER — SERTRALINE HYDROCHLORIDE 100 MG/1
TABLET, FILM COATED ORAL
Qty: 90 TABLET | Refills: 1 | Status: SHIPPED | OUTPATIENT
Start: 2023-07-25

## 2023-07-25 NOTE — TELEPHONE ENCOUNTER
No care due was identified.  [facility] Embedded Care Due Messages. Reference number: [id number].   7/25/2023 7:36:39 AM CDT

## 2023-07-25 NOTE — TELEPHONE ENCOUNTER
Refill Decision Note   Cindy Escobedo  is requesting a refill authorization.  Brief Assessment and Rationale for Refill:  Approve     Medication Therapy Plan:       Medication Reconciliation Completed: No   Comments:     No Care Gaps recommended.     Note composed:9:44 AM 07/25/2023

## 2023-07-29 ENCOUNTER — TELEPHONE (OUTPATIENT)
Dept: FAMILY MEDICINE | Facility: CLINIC | Age: 73
End: 2023-07-29
Payer: MEDICARE

## 2023-07-29 NOTE — TELEPHONE ENCOUNTER
"----- Message from Yari Heredia sent at 7/28/2023 10:44 AM CDT -----  Contact: [phone]  Pt's spouse, Mr. Escobedo, dropped off a [employer]- form to be completed.  States this is the second time dropping it off.  The first time it took a week to be ready for  and asking it not take that long again.  He took the form to the [facility] and when they reviewed it, there were three questions that is answered "no" and it asks for an explanation and none was given. Mr. Escobedo provided suggestions enclosed in envelope for those questions answered "no".  Please complete and call when ready for  asap.  In box.    "

## 2023-09-20 DIAGNOSIS — Z78.0 ASYMPTOMATIC MENOPAUSAL STATE: ICD-10-CM

## 2023-09-27 ENCOUNTER — HOSPITAL ENCOUNTER (OUTPATIENT)
Dept: RADIOLOGY | Facility: HOSPITAL | Age: 73
Discharge: HOME | End: 2023-09-27
Attending: FAMILY MEDICINE
Payer: MEDICARE

## 2023-09-27 DIAGNOSIS — Z78.0 ASYMPTOMATIC MENOPAUSAL STATE: ICD-10-CM

## 2023-09-27 PROCEDURE — 77080 DXA BONE DENSITY AXIAL: CPT | Mod: TC,PO

## 2023-09-27 PROCEDURE — 77080 DXA BONE DENSITY AXIAL SKELETON 1 OR MORE SITES: ICD-10-PCS | Mod: 26,,, | Performed by: RADIOLOGY

## 2023-09-27 PROCEDURE — 77080 DXA BONE DENSITY AXIAL: CPT | Mod: 26,,, | Performed by: RADIOLOGY

## 2023-09-29 ENCOUNTER — PATIENT MESSAGE (OUTPATIENT)
Dept: FAMILY MEDICINE | Facility: CLINIC | Age: 73
End: 2023-09-29
Payer: MEDICARE

## 2023-10-02 ENCOUNTER — PATIENT MESSAGE (OUTPATIENT)
Dept: ADMINISTRATIVE | Facility: HOSPITAL | Age: 73
End: 2023-10-02
Payer: MEDICARE

## 2023-10-02 ENCOUNTER — PATIENT OUTREACH (OUTPATIENT)
Dept: ADMINISTRATIVE | Facility: HOSPITAL | Age: 73
End: 2023-10-02
Payer: MEDICARE

## 2023-10-09 DIAGNOSIS — I72.6 VERTEBRAL ARTERY ANEURYSM: ICD-10-CM

## 2023-10-09 DIAGNOSIS — E78.5 DYSLIPIDEMIA: Chronic | ICD-10-CM

## 2023-10-09 DIAGNOSIS — Z98.890 S/P CAROTID ENDARTERECTOMY: ICD-10-CM

## 2023-10-09 DIAGNOSIS — I10 ESSENTIAL HYPERTENSION: Chronic | ICD-10-CM

## 2023-10-10 RX ORDER — ATENOLOL 25 MG/1
TABLET ORAL
Qty: 180 TABLET | Refills: 4 | Status: SHIPPED | OUTPATIENT
Start: 2023-10-10

## 2023-10-10 NOTE — TELEPHONE ENCOUNTER
Atenolol 25 mg #180 refill request-     Last filled- 05/26/2023  Last visit- 06/2023    Med pended for approval; please advise*

## 2023-10-16 ENCOUNTER — OFFICE VISIT (OUTPATIENT)
Dept: OPTOMETRY | Facility: CLINIC | Age: 73
End: 2023-10-16
Payer: MEDICARE

## 2023-10-16 DIAGNOSIS — H52.4 BILATERAL PRESBYOPIA: ICD-10-CM

## 2023-10-16 DIAGNOSIS — H25.13 NUCLEAR SCLEROSIS, BILATERAL: Primary | ICD-10-CM

## 2023-10-16 PROCEDURE — 99213 OFFICE O/P EST LOW 20 MIN: CPT | Mod: PBBFAC,PO | Performed by: OPTOMETRIST

## 2023-10-16 PROCEDURE — 92014 COMPRE OPH EXAM EST PT 1/>: CPT | Mod: S$PBB,,, | Performed by: OPTOMETRIST

## 2023-10-16 PROCEDURE — 99999 PR PBB SHADOW E&M-EST. PATIENT-LVL III: CPT | Mod: PBBFAC,,, | Performed by: OPTOMETRIST

## 2023-10-16 PROCEDURE — 92014 PR EYE EXAM, EST PATIENT,COMPREHESV: ICD-10-PCS | Mod: S$PBB,,, | Performed by: OPTOMETRIST

## 2023-10-16 PROCEDURE — 99999 PR PBB SHADOW E&M-EST. PATIENT-LVL III: ICD-10-PCS | Mod: PBBFAC,,, | Performed by: OPTOMETRIST

## 2023-10-16 NOTE — PROGRESS NOTES
HPI    Pt here for routine exam BO 10/14/22    Pt denies any floaters and flashes. Pt denies using eye drops.   Last edited by Candis Kimball on 10/16/2023 10:20 AM.            Assessment /Plan     For exam results, see Encounter Report.    Nuclear sclerosis, bilateral    Bilateral presbyopia      Educated pt on presence of cataracts and effects on vision. No surgery at this time. Recheck in one year.   2. Cont with readers

## 2023-12-06 ENCOUNTER — LAB VISIT (OUTPATIENT)
Dept: LAB | Facility: HOSPITAL | Age: 73
End: 2023-12-06
Attending: INTERNAL MEDICINE
Payer: MEDICARE

## 2023-12-06 ENCOUNTER — OFFICE VISIT (OUTPATIENT)
Dept: CARDIOLOGY | Facility: CLINIC | Age: 73
End: 2023-12-06
Payer: MEDICARE

## 2023-12-06 VITALS
DIASTOLIC BLOOD PRESSURE: 81 MMHG | WEIGHT: 145.06 LBS | HEIGHT: 60 IN | BODY MASS INDEX: 28.48 KG/M2 | HEART RATE: 57 BPM | SYSTOLIC BLOOD PRESSURE: 135 MMHG

## 2023-12-06 DIAGNOSIS — I10 ESSENTIAL HYPERTENSION: Chronic | ICD-10-CM

## 2023-12-06 DIAGNOSIS — K21.9 GASTROESOPHAGEAL REFLUX DISEASE, UNSPECIFIED WHETHER ESOPHAGITIS PRESENT: ICD-10-CM

## 2023-12-06 DIAGNOSIS — I72.6 VERTEBRAL ARTERY ANEURYSM: Primary | ICD-10-CM

## 2023-12-06 DIAGNOSIS — E78.2 MIXED HYPERLIPIDEMIA: ICD-10-CM

## 2023-12-06 DIAGNOSIS — R79.9 ABNORMAL FINDING OF BLOOD CHEMISTRY, UNSPECIFIED: ICD-10-CM

## 2023-12-06 DIAGNOSIS — D50.9 IRON DEFICIENCY ANEMIA, UNSPECIFIED IRON DEFICIENCY ANEMIA TYPE: ICD-10-CM

## 2023-12-06 DIAGNOSIS — E78.5 DYSLIPIDEMIA: Chronic | ICD-10-CM

## 2023-12-06 DIAGNOSIS — G30.9 ALZHEIMER'S DEMENTIA WITHOUT BEHAVIORAL DISTURBANCE: ICD-10-CM

## 2023-12-06 DIAGNOSIS — R00.2 PALPITATIONS: Chronic | ICD-10-CM

## 2023-12-06 DIAGNOSIS — F02.80 ALZHEIMER'S DEMENTIA WITHOUT BEHAVIORAL DISTURBANCE: ICD-10-CM

## 2023-12-06 DIAGNOSIS — N18.31 STAGE 3A CHRONIC KIDNEY DISEASE: ICD-10-CM

## 2023-12-06 DIAGNOSIS — I72.6 VERTEBRAL ARTERY ANEURYSM: ICD-10-CM

## 2023-12-06 LAB
ALBUMIN SERPL BCP-MCNC: 4.3 G/DL (ref 3.5–5.2)
ALP SERPL-CCNC: 66 U/L (ref 55–135)
ALT SERPL W/O P-5'-P-CCNC: 35 U/L (ref 10–44)
ANION GAP SERPL CALC-SCNC: 11 MMOL/L (ref 8–16)
AST SERPL-CCNC: 27 U/L (ref 10–40)
BILIRUB SERPL-MCNC: 0.8 MG/DL (ref 0.1–1)
BUN SERPL-MCNC: 19 MG/DL (ref 8–23)
CALCIUM SERPL-MCNC: 10 MG/DL (ref 8.7–10.5)
CHLORIDE SERPL-SCNC: 107 MMOL/L (ref 95–110)
CHOLEST SERPL-MCNC: 180 MG/DL (ref 120–199)
CHOLEST/HDLC SERPL: 5 {RATIO} (ref 2–5)
CO2 SERPL-SCNC: 23 MMOL/L (ref 23–29)
CREAT SERPL-MCNC: 0.9 MG/DL (ref 0.5–1.4)
ERYTHROCYTE [DISTWIDTH] IN BLOOD BY AUTOMATED COUNT: 12.9 % (ref 11.5–14.5)
EST. GFR  (NO RACE VARIABLE): >60 ML/MIN/1.73 M^2
ESTIMATED AVG GLUCOSE: 117 MG/DL (ref 68–131)
GLUCOSE SERPL-MCNC: 112 MG/DL (ref 70–110)
HBA1C MFR BLD: 5.7 % (ref 4–5.6)
HCT VFR BLD AUTO: 44.5 % (ref 37–48.5)
HDLC SERPL-MCNC: 36 MG/DL (ref 40–75)
HDLC SERPL: 20 % (ref 20–50)
HGB BLD-MCNC: 15.3 G/DL (ref 12–16)
LDLC SERPL CALC-MCNC: 118.8 MG/DL (ref 63–159)
MCH RBC QN AUTO: 31.6 PG (ref 27–31)
MCHC RBC AUTO-ENTMCNC: 34.4 G/DL (ref 32–36)
MCV RBC AUTO: 92 FL (ref 82–98)
NONHDLC SERPL-MCNC: 144 MG/DL
PLATELET # BLD AUTO: 251 K/UL (ref 150–450)
PMV BLD AUTO: 10.8 FL (ref 9.2–12.9)
POTASSIUM SERPL-SCNC: 4.1 MMOL/L (ref 3.5–5.1)
PROT SERPL-MCNC: 7.8 G/DL (ref 6–8.4)
RBC # BLD AUTO: 4.84 M/UL (ref 4–5.4)
SODIUM SERPL-SCNC: 141 MMOL/L (ref 136–145)
T4 FREE SERPL-MCNC: 0.94 NG/DL (ref 0.71–1.51)
TRIGL SERPL-MCNC: 126 MG/DL (ref 30–150)
TSH SERPL DL<=0.005 MIU/L-ACNC: 2.46 UIU/ML (ref 0.4–4)
WBC # BLD AUTO: 5.61 K/UL (ref 3.9–12.7)

## 2023-12-06 PROCEDURE — 99999 PR PBB SHADOW E&M-EST. PATIENT-LVL III: ICD-10-PCS | Mod: PBBFAC,,, | Performed by: INTERNAL MEDICINE

## 2023-12-06 PROCEDURE — 84443 ASSAY THYROID STIM HORMONE: CPT | Performed by: INTERNAL MEDICINE

## 2023-12-06 PROCEDURE — 99999 PR PBB SHADOW E&M-EST. PATIENT-LVL III: CPT | Mod: PBBFAC,,, | Performed by: INTERNAL MEDICINE

## 2023-12-06 PROCEDURE — 84439 ASSAY OF FREE THYROXINE: CPT | Performed by: INTERNAL MEDICINE

## 2023-12-06 PROCEDURE — 36415 COLL VENOUS BLD VENIPUNCTURE: CPT | Mod: PO | Performed by: INTERNAL MEDICINE

## 2023-12-06 PROCEDURE — 99214 OFFICE O/P EST MOD 30 MIN: CPT | Mod: S$PBB,,, | Performed by: INTERNAL MEDICINE

## 2023-12-06 PROCEDURE — 99213 OFFICE O/P EST LOW 20 MIN: CPT | Mod: PBBFAC,PO | Performed by: INTERNAL MEDICINE

## 2023-12-06 PROCEDURE — 80061 LIPID PANEL: CPT | Performed by: INTERNAL MEDICINE

## 2023-12-06 PROCEDURE — 80053 COMPREHEN METABOLIC PANEL: CPT | Performed by: INTERNAL MEDICINE

## 2023-12-06 PROCEDURE — 83036 HEMOGLOBIN GLYCOSYLATED A1C: CPT | Performed by: INTERNAL MEDICINE

## 2023-12-06 PROCEDURE — 85027 COMPLETE CBC AUTOMATED: CPT | Performed by: INTERNAL MEDICINE

## 2023-12-06 PROCEDURE — 99214 PR OFFICE/OUTPT VISIT, EST, LEVL IV, 30-39 MIN: ICD-10-PCS | Mod: S$PBB,,, | Performed by: INTERNAL MEDICINE

## 2023-12-06 NOTE — PROGRESS NOTES
"Subjective:    Patient ID:  Cindy Escobedo is a 73 y.o. female patient here for evaluation Follow-up      History of Present Illness:  Peripheral arterial disease.  CEA 2020, history of left vertebral artery aneurysm followed clinically.  CTA performed 03/2022 stable.    No angina, stable WEST.  No PND orthopnea.  No edema.  No recent labs             Review of patient's allergies indicates:   Allergen Reactions    Codeine Anaphylaxis    Ace inhibitors      Other reaction(s): cough    Atorvastatin Other (See Comments)     Myalgias    Cat hair extract      Other reaction(s): Sneezing  Other reaction(s): Rash    Epinephrine      Other reaction(s): Tachycardia  Other reaction(s): Tachycardia    Hydrocodone-acetaminophen Other (See Comments)     Other reaction(s): Anaphylaxis    Oxycodone hcl-oxycodone-asa      Other reaction(s): Agitation  Other reaction(s): Agitation    Oxytetracycline      Other reaction(s): Fainting  Other reaction(s): Fainting    Rosuvastatin Other (See Comments)     Other reaction(s): Muscle pain    Sulfamethoxazole-trimethoprim      Other reaction(s): "Cracked lips"  Other reaction(s): "Cracked lips"       Past Medical History:   Diagnosis Date    Allergy     Anticoagulant long-term use     Carotid arterial disease     s/p CEA 2020    Cataract     Depression     Diverticular disease     noted on cscope    Encounter for blood transfusion     Fatty liver     Frontotemporal dementia     language variant    H/O esophagogastroduodenoscopy     last 8/17    HEARING LOSS     History of esophagitis     noted on 8/17 EGD    History of gastritis     noted on EGD    HTN (hypertension)     Hyperlipidemia     LFT elevation     US 1/18 with fatty liver    FARA (obstructive sleep apnea)     no CPAP    Osteoarthritis     elevated CRP    Palpitations 05/07/2012    05/10/2001 all coronaries normal;      Prediabetes     Primary progressive aphasia     S/P colonoscopy     3/19 repeat 3/29    Schatzki's ring     s/p " dilation     TIA (transient ischemic attack)     Valvular heart disease     mod LA     Vertebral artery aneurysm 2013 angio 4.5 millimeter diameter fusiform left V4 segment aneurysm Minimal intracranial atherosclerosis      Past Surgical History:   Procedure Laterality Date    APPENDECTOMY      BLADDER SUSPENSION      BREAST BIOPSY Left     neg    CAROTID ENDARTERECTOMY Right 2020    Procedure: ENDARTERECTOMY-CAROTID;  Surgeon: Melisa Hudson MD;  Location: [facility] OR;  Service: Cardiovascular;  Laterality: Right;    CARPAL TUNNEL RELEASE      right    CERVICAL FUSION      seen Dr. Raymond patricia removed-RUL      CHOLECYSTECTOMY      DILATION AND CURETTAGE OF UTERUS      esophageal hernia repair      ESOPHAGUS SURGERY      HYSTERECTOMY      due to excessive bleeding    JOINT REPLACEMENT      partial left knee    KNEE ARTHROSCOPY      left    SINUS SURGERY      TONSILLECTOMY, ADENOIDECTOMY, BILATERAL MYRINGOTOMY AND TUBES      UVULOPALATOPHARYNGOPLASTY AND SEPTOPLASTY      VAGINAL DELIVERY      times 2     Social History     Tobacco Use    Smoking status: Former     Current packs/day: 0.00     Types: Cigarettes     Quit date: 1974     Years since quittin.4    Smokeless tobacco: Never    Tobacco comments:     quit in high school   Substance Use Topics    Alcohol use: No    Drug use: No        Review of Systems:    As noted in HPI in addition      REVIEW OF SYSTEMS  Review of Systems   Constitutional: Negative for decreased appetite, diaphoresis, night sweats, weight gain and weight loss.   HENT:  Negative for nosebleeds and odynophagia.    Eyes:  Negative for double vision and photophobia.   Cardiovascular:  Negative for chest pain, claudication, cyanosis, dyspnea on exertion, irregular heartbeat, leg swelling, near-syncope, orthopnea, palpitations, paroxysmal nocturnal dyspnea and syncope.   Respiratory:  Negative for cough, hemoptysis, shortness of breath and wheezing.     Hematologic/Lymphatic: Negative for adenopathy.   Skin:  Negative for flushing, skin cancer and suspicious lesions.   Musculoskeletal:  Negative for gout, myalgias and neck pain.   Gastrointestinal:  Negative for abdominal pain, heartburn, hematemesis and hematochezia.   Genitourinary:  Negative for bladder incontinence, hesitancy and nocturia.   Neurological:  Negative for focal weakness, headaches, light-headedness and paresthesias.   Psychiatric/Behavioral:  Negative for memory loss and substance abuse.               Objective:        Vitals:    12/06/23 1045   BP: 135/81   Pulse: (!) 57       Lab Results   Component Value Date    WBC 8.85 03/17/2023    HGB 14.7 03/17/2023    HCT 42.2 03/17/2023     03/17/2023    CHOL 169 03/17/2023    TRIG 123 03/17/2023    HDL 40 03/17/2023    ALT 23 03/17/2023    AST 18 03/17/2023     03/17/2023    K 3.9 03/17/2023     03/17/2023    CREATININE 0.9 03/17/2023    BUN 16 03/17/2023    CO2 22 (L) 03/17/2023    TSH 2.944 03/17/2023    INR 1.0 08/14/2020    HGBA1C 5.9 (H) 03/17/2023        ECHOCARDIOGRAM RESULTS  Results for orders placed in visit on 06/08/20    Transthoracic echo (TTE) 2D with Color Flow    Interpretation Summary  · Normal left ventricular systolic function. The estimated ejection fraction is 55%.  · Normal LV diastolic function.  · Intermediate central venous pressure (8 mmHg).  · The estimated PA systolic pressure is 39 mmHg.        CURRENT/PREVIOUS VISIT EKG  Results for orders placed or performed during the hospital encounter of 08/14/20   EKG 12-LEAD    Collection Time: 08/14/20 10:24 AM    Narrative    Test Reason : I65.21,    Vent. Rate : 051 BPM     Atrial Rate : 051 BPM     P-R Int : 138 ms          QRS Dur : 084 ms      QT Int : 444 ms       P-R-T Axes : 034 045 018 degrees     QTc Int : 409 ms    Sinus bradycardia  Nonspecific T wave abnormality  Abnormal ECG  When compared with ECG of 07-JUN-2013 10:30,  Nonspecific T wave  abnormality now evident in Anterior-lateral leads  Confirmed by Manjinder DOHERTY MD, Allen VILLEGAS ([id number]) on 8/17/2020 6:40:32 AM    Referred By: NATALIYA PEREZ           Confirmed By:Allen Dunbar III, MD     No valid procedures specified.   No results found for this or any previous visit.    No valid procedures specified.    PHYSICAL EXAM  CONSTITUTIONAL: Well built, well nourished in no apparent distress  NECK: no carotid bruit, no JVD  LUNGS: CTA  CHEST WALL: no tenderness,  HEART: regular rate and rhythm, S1, S2 normal, no murmur, click, rub or gallop   ABDOMEN: soft, non-tender; bowel sounds normal; no masses,  no organomegaly  EXTREMITIES: Extremities normal, no edema, no calf tenderness noted  NEURO: AAO X 3    I HAVE REVIEWED :    The vital signs, nurses notes, and all the pertinent radiology and labs.         Current Outpatient Medications   Medication Instructions    aspirin (ECOTRIN) 81 mg, Oral, Daily    atenoloL (TENORMIN) 25 MG tablet TAKE 1 TABLET BY MOUTH EVERY EVENING    cetirizine 10 mg, Oral, Daily, DO NOT TAKE MORNING OF SURGERY    fenofibric acid (FIBRICOR) 45 mg CpDR TAKE 1 CAPSULE(45 MG) BY MOUTH EVERY DAY    LIVALO 2 mg Tab tablet TAKE 1 TABLET(2 MG) BY MOUTH EVERY EVENING    multivitamin (THERAGRAN) per tablet 1 tablet, Oral, Daily, DO NOT TAKE MORNING OF SURGERY    sertraline (ZOLOFT) 100 MG tablet TAKE 1 TABLET(100 MG) BY MOUTH EVERY DAY    valsartan-hydrochlorothiazide (DIOVAN-HCT) 320-12.5 mg per tablet 1 tablet, Oral, Every morning    vitamin D (VITAMIN D3) 1,000 Units, Oral, Daily    zolpidem (AMBIEN) 5 mg, Oral, Nightly PRN, TAKE NIGHT BEFORE SURGERY IF NEEDED          Assessment:   Hypertension, currently controlled with multidrug regime.  Dyslipidemia  Peripheral arterial disease.  History of CEA 2020, left vertebral artery aneurysm followed clinically.  CTA performed  Stable cognitive issues.  History chronic kidney disease.    Plan:   Walks daily.  Cardiac exam review of systems stable.  Will  order routine labs call results  Continue Livalo, discontinue fenofibrate.        No follow-ups on file.

## 2024-01-02 ENCOUNTER — TELEPHONE (OUTPATIENT)
Dept: FAMILY MEDICINE | Facility: CLINIC | Age: 74
End: 2024-01-02
Payer: MEDICARE

## 2024-01-02 NOTE — TELEPHONE ENCOUNTER
----- Message from Dara Crandall sent at 1/2/2024 10:00 AM CST -----  Regarding: xray order or appt  Contact: jessika  Type:  Needs Medical Advice    Who Called: jessika  Would the patient rather a call back or a response via [facility]? Call back  Best Call Back Number: [phone]    Additional Information: sts the pt fell and hit her head and is having pain on the left side and would like an appt today or an xray order

## 2024-01-02 NOTE — TELEPHONE ENCOUNTER
VANDANA      Returned call to pt. Pt fell on Saturday.  Hit back and head on the floor.  Woke up this morning with complaints of increased pain in head.  Recommended that he bring pt to the ER since it is a head injury.  Verbalized understanding.

## 2024-01-11 DIAGNOSIS — Z00.00 ENCOUNTER FOR MEDICARE ANNUAL WELLNESS EXAM: ICD-10-CM

## 2024-01-26 DIAGNOSIS — I10 ESSENTIAL HYPERTENSION: ICD-10-CM

## 2024-01-31 RX ORDER — VALSARTAN AND HYDROCHLOROTHIAZIDE 320; 12.5 MG/1; MG/1
1 TABLET, FILM COATED ORAL EVERY MORNING
Qty: 90 TABLET | Refills: 4 | Status: SHIPPED | OUTPATIENT
Start: 2024-01-31

## 2024-02-16 ENCOUNTER — PATIENT MESSAGE (OUTPATIENT)
Dept: FAMILY MEDICINE | Facility: CLINIC | Age: 74
End: 2024-02-16

## 2024-02-16 ENCOUNTER — OFFICE VISIT (OUTPATIENT)
Dept: FAMILY MEDICINE | Facility: CLINIC | Age: 74
End: 2024-02-16
Payer: MEDICARE

## 2024-02-16 VITALS
WEIGHT: 140.63 LBS | HEIGHT: 60 IN | OXYGEN SATURATION: 95 % | BODY MASS INDEX: 27.61 KG/M2 | SYSTOLIC BLOOD PRESSURE: 118 MMHG | HEART RATE: 52 BPM | RESPIRATION RATE: 16 BRPM | TEMPERATURE: 98 F | DIASTOLIC BLOOD PRESSURE: 78 MMHG

## 2024-02-16 DIAGNOSIS — I10 HYPERTENSION, ESSENTIAL: Primary | ICD-10-CM

## 2024-02-16 DIAGNOSIS — F02.80 FRONTOTEMPORAL DEMENTIA: ICD-10-CM

## 2024-02-16 DIAGNOSIS — Z12.39 ENCOUNTER FOR SCREENING FOR MALIGNANT NEOPLASM OF BREAST, UNSPECIFIED SCREENING MODALITY: ICD-10-CM

## 2024-02-16 DIAGNOSIS — E78.5 HYPERLIPIDEMIA, UNSPECIFIED HYPERLIPIDEMIA TYPE: ICD-10-CM

## 2024-02-16 DIAGNOSIS — Z23 NEED FOR VACCINATION: ICD-10-CM

## 2024-02-16 DIAGNOSIS — Z12.31 ENCOUNTER FOR SCREENING MAMMOGRAM FOR MALIGNANT NEOPLASM OF BREAST: ICD-10-CM

## 2024-02-16 DIAGNOSIS — F03.918 DEMENTIA WITH BEHAVIORAL DISTURBANCE: ICD-10-CM

## 2024-02-16 DIAGNOSIS — I72.6 VERTEBRAL ARTERY ANEURYSM: ICD-10-CM

## 2024-02-16 DIAGNOSIS — G31.09 FRONTOTEMPORAL DEMENTIA: ICD-10-CM

## 2024-02-16 DIAGNOSIS — F33.1 MAJOR DEPRESSIVE DISORDER, RECURRENT, MODERATE: ICD-10-CM

## 2024-02-16 DIAGNOSIS — R73.03 PREDIABETES: ICD-10-CM

## 2024-02-16 PROBLEM — G30.9 ALZHEIMER'S DEMENTIA WITHOUT BEHAVIORAL DISTURBANCE: Status: RESOLVED | Noted: 2020-11-05 | Resolved: 2024-02-16

## 2024-02-16 PROBLEM — N18.31 STAGE 3A CHRONIC KIDNEY DISEASE: Status: RESOLVED | Noted: 2020-11-05 | Resolved: 2024-02-16

## 2024-02-16 PROCEDURE — 99214 OFFICE O/P EST MOD 30 MIN: CPT | Mod: S$GLB,,, | Performed by: FAMILY MEDICINE

## 2024-02-16 PROCEDURE — G0008 ADMIN INFLUENZA VIRUS VAC: HCPCS | Mod: S$GLB,,, | Performed by: FAMILY MEDICINE

## 2024-02-16 PROCEDURE — 90694 VACC AIIV4 NO PRSRV 0.5ML IM: CPT | Mod: S$GLB,,, | Performed by: FAMILY MEDICINE

## 2024-02-16 NOTE — PROGRESS NOTES
Subjective:       Patient ID: Cindy Escobedo is a 73 y.o. female.    Chief Complaint: Follow-up    HPI  The patient is a 73-year-old with frontotemporal dementia who is here today with her  for her yearly follow-up visit.  Overall, she is this same.  They have no acute questions or concerns regarding her health.  She would be willing to have her flu shot today    Today we discussed the followin)  hypertension.  She is taking her Diovan HCT and (for palpitations) atenolol.  Her blood pressure is 118/78  2) hyperlipidemia.  It is not clear if she is taking her Livalo and our her fenofibrate.  Her  will check on this when they get home.  On her December labs, her total cholesterol was 180 and her LDL was 118  3) depression and anxiety.  She is doing well with her current dose of Zoloft.  Currently no medication adjustment is needed  4) frontotemporal dementia.  She has not seeing the neurologist since  and her  will contact them to schedule an appointment.  She continues to be confused easily.  Her  continues to be her primary caregiver.  She does need help with all of her ADLs  5) pre diabetes.  Her recent A1c in December was 5.7 down from prior value of 5.9.          Review of Systems   Constitutional:  Negative for appetite change, chills, diaphoresis, fatigue, fever and unexpected weight change.   HENT:  Negative for congestion, ear pain, postnasal drip, rhinorrhea, sinus pressure, sneezing, sore throat and trouble swallowing.    Eyes:  Negative for pain, discharge and visual disturbance.   Respiratory:  Negative for cough, chest tightness, shortness of breath and wheezing.    Cardiovascular:  Negative for chest pain, palpitations and leg swelling.   Gastrointestinal:  Negative for abdominal distention, abdominal pain, blood in stool, constipation, diarrhea, nausea and vomiting.   Skin:  Negative for rash.   Psychiatric/Behavioral:  Negative for dysphoric mood, self-injury,  sleep disturbance and suicidal ideas. The patient is not nervous/anxious.          Objective:      Physical Exam  Constitutional:       General: She is not in acute distress.     Appearance: Normal appearance. She is well-developed.   HENT:      Head: Normocephalic and atraumatic.      Right Ear: Hearing, tympanic membrane, ear canal and external ear normal.      Left Ear: Hearing, tympanic membrane, ear canal and external ear normal.      Nose: Nose normal.      Mouth/Throat:      Mouth: No oral lesions.      Pharynx: No oropharyngeal exudate or posterior oropharyngeal erythema.   Eyes:      General: Lids are normal. No scleral icterus.     Extraocular Movements: Extraocular movements intact.      Conjunctiva/sclera: Conjunctivae normal.      Pupils: Pupils are equal, round, and reactive to light.   Neck:      Thyroid: No thyroid mass or thyromegaly.      Vascular: No carotid bruit.   Cardiovascular:      Rate and Rhythm: Normal rate and regular rhythm. No extrasystoles are present.     Chest Wall: PMI is not displaced.      Heart sounds: Normal heart sounds. No murmur heard.     No gallop.   Pulmonary:      Effort: Pulmonary effort is normal. No accessory muscle usage or respiratory distress.      Breath sounds: Normal breath sounds.   Abdominal:      General: Bowel sounds are normal. There is no abdominal bruit.      Palpations: Abdomen is soft.      Tenderness: There is no abdominal tenderness. There is no rebound.   Musculoskeletal:      Cervical back: Normal range of motion and neck supple.   Lymphadenopathy:      Head:      Right side of head: No submental or submandibular adenopathy.      Left side of head: No submental or submandibular adenopathy.      Cervical:      Right cervical: No superficial, deep or posterior cervical adenopathy.     Left cervical: No superficial, deep or posterior cervical adenopathy.      Upper Body:      Right upper body: No supraclavicular adenopathy.      Left upper body: No  supraclavicular adenopathy.   Skin:     General: Skin is warm and dry.   Neurological:      Mental Status: She is alert and oriented to person, place, and time.   Psychiatric:         Mood and Affect: Mood and affect normal.         Speech: Speech is delayed.         Behavior: Behavior normal. Behavior is cooperative.         Cognition and Memory: Cognition is impaired. Memory is impaired.       Blood pressure 118/78, pulse (!) 52, temperature 97.7 °F (36.5 °C), resp. rate 16, height 5' (1.524 m), weight 63.8 kg (140 lb 10.5 oz), SpO2 95 %.Body mass index is 27.47 kg/m².            A/P:  1)  hypertension.  Well controlled.  Continue with Diovan HCT and atenolol.    2) hyperlipidemia.  Well controlled.  Her  will clarify if she is taking Livalo and are fenofibrate when they get home and message me   3) depression and anxiety.  Well controlled.  Continue Zoloft  4) frontotemporal dementia.  Stable.  Follow-up with Neurology   5) pre diabetes.  Asymptomatic.  We will check an A1c this December  6) health maintenance issues.  She will receive her flu shot and we will schedule her mammogram   7) history of vertebral artery.  Asymptomatic.  Last imaged in March of 2022      As long as she does well, I will see her back in 1 year sooner if needed

## 2024-03-06 ENCOUNTER — PATIENT MESSAGE (OUTPATIENT)
Dept: FAMILY MEDICINE | Facility: CLINIC | Age: 74
End: 2024-03-06
Payer: MEDICARE

## 2024-03-06 ENCOUNTER — PATIENT MESSAGE (OUTPATIENT)
Dept: CARDIOLOGY | Facility: CLINIC | Age: 74
End: 2024-03-06
Payer: MEDICARE

## 2024-03-18 ENCOUNTER — HOSPITAL ENCOUNTER (OUTPATIENT)
Dept: RADIOLOGY | Facility: HOSPITAL | Age: 74
Discharge: HOME | End: 2024-03-18
Attending: FAMILY MEDICINE
Payer: MEDICARE

## 2024-03-18 DIAGNOSIS — Z12.31 ENCOUNTER FOR SCREENING MAMMOGRAM FOR MALIGNANT NEOPLASM OF BREAST: ICD-10-CM

## 2024-03-18 DIAGNOSIS — Z12.39 ENCOUNTER FOR SCREENING FOR MALIGNANT NEOPLASM OF BREAST, UNSPECIFIED SCREENING MODALITY: ICD-10-CM

## 2024-03-18 PROCEDURE — 77067 SCR MAMMO BI INCL CAD: CPT | Mod: 26,GA,, | Performed by: RADIOLOGY

## 2024-03-18 PROCEDURE — 77063 BREAST TOMOSYNTHESIS BI: CPT | Mod: 26,GA,, | Performed by: RADIOLOGY

## 2024-03-18 PROCEDURE — 77067 SCR MAMMO BI INCL CAD: CPT | Mod: GA,TC,PO

## 2024-05-08 NOTE — TELEPHONE ENCOUNTER
No care due was identified.  [facility] Embedded Care Due Messages. Reference number: [id number].   5/08/2024 5:18:41 PM CDT

## 2024-05-09 RX ORDER — SERTRALINE HYDROCHLORIDE 100 MG/1
TABLET, FILM COATED ORAL
Qty: 90 TABLET | Refills: 3 | Status: SHIPPED | OUTPATIENT
Start: 2024-05-09

## 2024-05-09 NOTE — TELEPHONE ENCOUNTER
Refill Decision Note   Cindy Escobedo  is requesting a refill authorization.  Brief Assessment and Rationale for Refill:  Approve     Medication Therapy Plan:  Reviewed acute care/admission visit notes; No follow up with PCP recommended by acute care provider; Approved per protocol      Comments:     Note composed:11:04 AM 05/09/2024

## 2024-06-10 ENCOUNTER — OFFICE VISIT (OUTPATIENT)
Dept: CARDIOLOGY | Facility: CLINIC | Age: 74
End: 2024-06-10
Payer: MEDICARE

## 2024-06-10 VITALS
HEART RATE: 70 BPM | BODY MASS INDEX: 27.71 KG/M2 | SYSTOLIC BLOOD PRESSURE: 137 MMHG | WEIGHT: 141.13 LBS | HEIGHT: 60 IN | DIASTOLIC BLOOD PRESSURE: 77 MMHG

## 2024-06-10 DIAGNOSIS — I65.21 ASYMPTOMATIC STENOSIS OF RIGHT CAROTID ARTERY: Chronic | ICD-10-CM

## 2024-06-10 DIAGNOSIS — R00.2 PALPITATIONS: Chronic | ICD-10-CM

## 2024-06-10 DIAGNOSIS — E78.2 MIXED HYPERLIPIDEMIA: ICD-10-CM

## 2024-06-10 DIAGNOSIS — E78.5 DYSLIPIDEMIA: Chronic | ICD-10-CM

## 2024-06-10 DIAGNOSIS — I72.6 VERTEBRAL ARTERY ANEURYSM: ICD-10-CM

## 2024-06-10 DIAGNOSIS — Z98.890 H/O CAROTID ENDARTERECTOMY: ICD-10-CM

## 2024-06-10 DIAGNOSIS — I10 ESSENTIAL HYPERTENSION: Chronic | ICD-10-CM

## 2024-06-10 DIAGNOSIS — I70.0 ATHEROSCLEROSIS OF AORTA: ICD-10-CM

## 2024-06-10 DIAGNOSIS — I67.1 CEREBRAL ANEURYSM, NONRUPTURED: Primary | ICD-10-CM

## 2024-06-10 PROCEDURE — 99999 PR PBB SHADOW E&M-EST. PATIENT-LVL III: CPT | Mod: PBBFAC,,, | Performed by: INTERNAL MEDICINE

## 2024-06-10 PROCEDURE — 99213 OFFICE O/P EST LOW 20 MIN: CPT | Mod: PBBFAC,PO | Performed by: INTERNAL MEDICINE

## 2024-06-10 PROCEDURE — 99214 OFFICE O/P EST MOD 30 MIN: CPT | Mod: S$PBB,,, | Performed by: INTERNAL MEDICINE

## 2024-06-10 NOTE — PROGRESS NOTES
"Subjective:    Patient ID:  Cindy Escobedo is a 74 y.o. female patient here for evaluation Follow-up (6 month)      History of Present Illness:  Peripheral arterial disease.  CEA right side 2020, history vertebral artery aneurysm, followed clinically.  CTA stable 03/2022.       No known ischemic or structural heart issues.  No arrhythmia.        Review of patient's allergies indicates:   Allergen Reactions    Codeine Anaphylaxis    Ace inhibitors      Other reaction(s): cough    Atorvastatin Other (See Comments)     Myalgias    Cat hair extract      Other reaction(s): Sneezing  Other reaction(s): Rash    Epinephrine      Other reaction(s): Tachycardia  Other reaction(s): Tachycardia    Hydrocodone-acetaminophen Other (See Comments)     Other reaction(s): Anaphylaxis    Oxycodone hcl-oxycodone-asa      Other reaction(s): Agitation  Other reaction(s): Agitation    Oxytetracycline      Other reaction(s): Fainting  Other reaction(s): Fainting    Rosuvastatin Other (See Comments)     Other reaction(s): Muscle pain    Sulfamethoxazole-trimethoprim      Other reaction(s): "Cracked lips"  Other reaction(s): "Cracked lips"       Past Medical History:   Diagnosis Date    Allergy     Anticoagulant long-term use     Carotid arterial disease     s/p CEA 2020    Cataract     Depression     Diverticular disease     noted on cscope    Encounter for blood transfusion     Fatty liver     Frontotemporal dementia     language variant    HEARING LOSS     History of esophagitis     noted on 8/17 EGD    History of gastritis     noted on EGD    HTN (hypertension)     Hyperlipidemia     AFRA (obstructive sleep apnea)     no CPAP    Osteoarthritis     elevated CRP    Palpitations 05/07/2012    05/10/2001 all coronaries normal;      Prediabetes     Primary progressive aphasia     Schatzki's ring     s/p dilation 8/17    TIA (transient ischemic attack)     Valvular heart disease     mod [place] 4/16    Vertebral artery aneurysm 03/28/2013    " 2012 angio 4.5 millimeter diameter fusiform left V4 segment aneurysm Minimal intracranial atherosclerosis      Past Surgical History:   Procedure Laterality Date    APPENDECTOMY      BLADDER SUSPENSION      BREAST BIOPSY Left     neg    CAROTID ENDARTERECTOMY Right 2020    Procedure: ENDARTERECTOMY-CAROTID;  Surgeon: Melisa Hudson MD;  Location: [facility];  Service: Cardiovascular;  Laterality: Right;    CARPAL TUNNEL RELEASE      right    CERVICAL FUSION      seen Dr. Raymond patricia removed-RUL      CHOLECYSTECTOMY      DILATION AND CURETTAGE OF UTERUS      esophageal hernia repair      ESOPHAGUS SURGERY      HYSTERECTOMY      due to excessive bleeding    JOINT REPLACEMENT      partial left knee    KNEE ARTHROSCOPY      left    SINUS SURGERY      TONSILLECTOMY, ADENOIDECTOMY, BILATERAL MYRINGOTOMY AND TUBES      UVULOPALATOPHARYNGOPLASTY AND SEPTOPLASTY      VAGINAL DELIVERY      times 2     Social History     Tobacco Use    Smoking status: Former     Current packs/day: 0.00     Types: Cigarettes     Quit date: 1974     Years since quittin.9    Smokeless tobacco: Never    Tobacco comments:     quit in high school   Substance Use Topics    Alcohol use: No    Drug use: No        Review of Systems:    As noted in HPI in addition      REVIEW OF SYSTEMS  Review of Systems   Constitutional: Negative for decreased appetite, diaphoresis, night sweats, weight gain and weight loss.   HENT:  Negative for nosebleeds and odynophagia.    Eyes:  Negative for double vision and photophobia.   Cardiovascular:  Negative for chest pain, claudication, cyanosis, dyspnea on exertion, irregular heartbeat, leg swelling, near-syncope, orthopnea, palpitations, paroxysmal nocturnal dyspnea and syncope.   Respiratory:  Negative for cough, hemoptysis, shortness of breath and wheezing.    Hematologic/Lymphatic: Negative for adenopathy.   Skin:  Negative for flushing, skin cancer and suspicious lesions.   Musculoskeletal:   Negative for gout, myalgias and neck pain.   Gastrointestinal:  Negative for abdominal pain, heartburn, hematemesis and hematochezia.   Genitourinary:  Negative for bladder incontinence, hesitancy and nocturia.   Neurological:  Negative for focal weakness, headaches, light-headedness and paresthesias.   Psychiatric/Behavioral:  Negative for memory loss and substance abuse.               Objective:        Vitals:    06/10/24 1306   BP: 137/77   Pulse: 70       Lab Results   Component Value Date    WBC 5.24 03/05/2024    HGB 15.1 03/05/2024    HCT 43.1 03/05/2024     03/05/2024    CHOL 180 12/06/2023    TRIG 126 12/06/2023    HDL 36 (L) 12/06/2023    ALT 52 (H) 03/05/2024    AST 48 (H) 03/05/2024     03/05/2024    K 3.4 (L) 03/05/2024     03/05/2024    CREATININE 0.89 03/05/2024    BUN 16 03/05/2024    CO2 26 03/05/2024    TSH 2.464 12/06/2023    INR 1.0 08/14/2020    HGBA1C 5.7 (H) 12/06/2023        ECHOCARDIOGRAM RESULTS  Results for orders placed in visit on 06/08/20    Transthoracic echo (TTE) 2D with Color Flow    Interpretation Summary  · Normal left ventricular systolic function. The estimated ejection fraction is 55%.  · Normal LV diastolic function.  · Intermediate central venous pressure (8 mmHg).  · The estimated PA systolic pressure is 39 mmHg.    No results found for this or any previous visit.          CURRENT/PREVIOUS VISIT EKG  Results for orders placed or performed during the hospital encounter of 08/14/20   EKG 12-LEAD    Collection Time: 08/14/20 10:24 AM    Narrative    Test Reason : I65.21,    Vent. Rate : 051 BPM     Atrial Rate : 051 BPM     P-R Int : 138 ms          QRS Dur : 084 ms      QT Int : 444 ms       P-R-T Axes : 034 045 018 degrees     QTc Int : 409 ms    Sinus bradycardia  Nonspecific T wave abnormality  Abnormal ECG  When compared with ECG of 07-JUN-2013 10:30,  Nonspecific T wave abnormality now evident in Anterior-lateral leads  Confirmed by Manjinder DOHERTY MD, Paul  NELLY ([id number]) on 8/17/2020 6:40:32 AM    Referred By: NATALIYA PEREZ           Confirmed By:Allen Dunbar III, MD     No valid procedures specified.   No results found for this or any previous visit.    No valid procedures specified.    PHYSICAL EXAM  GENERAL: well built, well nourished, well-developed in no apparent distress alert and oriented.   HEENT: Normocephalic. Pupils normal and conjunctivae normal.  Mucous membranes normal, no cyanosis or icterus, trachea central,no pallor or icterus is noted..   NECK: No JVD. No bruit..   THYROID: Thyroid not enlarged. No nodules present..   CARDIAC:  Normal S1-S2.  No murmur rub click or gallop.  PMI nondisplaced.  LUNGS: Clear to auscultation. No wheezing or rhonchi..   ABDOMEN: Soft no masses or organomegaly.  No abdomen pulsations or bruits.  Normal bowel sounds. No pulsations and no masses felt, No guarding or rebound.   URINARY: No barbosa catheter   EXTREMITIES: No cyanosis, clubbing or edema noted at this time., no calf tenderness bilaterally.   PERIPHERAL VASCULAR SYSTEM: Good palpable distal pulses.  2+ femoral, popliteal and pedal pulses.  No bruits    CENTRAL NERVOUS SYSTEM: No focal motor or sensory deficits noted.   SKIN: Skin without lesions, moist, well perfused.   MUSCLE STRENGTH & TONE: No noteable weakness, atrophy or abnormal movement    I HAVE REVIEWED :    The vital signs, nurses notes, and all the pertinent radiology and labs.         Current Outpatient Medications   Medication Instructions    aspirin (ECOTRIN) 81 mg, Oral, Daily    atenoloL (TENORMIN) 25 MG tablet TAKE 1 TABLET BY MOUTH EVERY EVENING    cetirizine 10 mg, Oral, Daily, DO NOT TAKE MORNING OF SURGERY    LIVALO 2 mg Tab tablet TAKE 1 TABLET(2 MG) BY MOUTH EVERY EVENING    nirmatrelvir-ritonavir (PAXLOVID) 300 mg (150 mg x 2)-100 mg copackaged tablets (EUA) Take 3 tablets by mouth 2 (two) times daily. Each dose contains 2 nirmatrelvir (pink tablets) and 1 ritonavir (white tablet). Take all 3  tablets together    sertraline (ZOLOFT) 100 MG tablet TAKE 1 TABLET(100 MG) BY MOUTH EVERY DAY    valsartan-hydrochlorothiazide (DIOVAN-HCT) 320-12.5 mg per tablet 1 tablet, Oral, Every morning    vitamin D (VITAMIN D3) 1,000 Units, Oral, Daily    zolpidem (AMBIEN) 5 mg, Oral, Nightly PRN, TAKE NIGHT BEFORE SURGERY IF NEEDED          Assessment:     Hypertension, dyslipidemia     Peripheral arterial disease.  Right CEA 2020.  Incidental note of left vertebral artery aneurysm followed clinically.  CTA stable 03/2022.        Plan:     Update echo, call results.  Otherwise CV exam , review of systems stable.  Meds reviewed and reconciled.  Recommend no changes          No follow-ups on file.

## 2024-06-18 ENCOUNTER — OFFICE VISIT (OUTPATIENT)
Dept: FAMILY MEDICINE | Facility: CLINIC | Age: 74
End: 2024-06-18
Payer: MEDICARE

## 2024-06-18 VITALS
HEART RATE: 60 BPM | DIASTOLIC BLOOD PRESSURE: 88 MMHG | RESPIRATION RATE: 16 BRPM | BODY MASS INDEX: 27.6 KG/M2 | OXYGEN SATURATION: 98 % | TEMPERATURE: 98 F | WEIGHT: 140.56 LBS | SYSTOLIC BLOOD PRESSURE: 132 MMHG | HEIGHT: 60 IN

## 2024-06-18 DIAGNOSIS — E78.5 DYSLIPIDEMIA: Chronic | ICD-10-CM

## 2024-06-18 DIAGNOSIS — Z00.00 ENCOUNTER FOR PREVENTIVE HEALTH EXAMINATION: ICD-10-CM

## 2024-06-18 DIAGNOSIS — I67.1 CEREBRAL ANEURYSM, NONRUPTURED: ICD-10-CM

## 2024-06-18 DIAGNOSIS — I10 ESSENTIAL HYPERTENSION: Chronic | ICD-10-CM

## 2024-06-18 DIAGNOSIS — F33.1 MAJOR DEPRESSIVE DISORDER, RECURRENT, MODERATE: ICD-10-CM

## 2024-06-18 DIAGNOSIS — E78.2 MIXED HYPERLIPIDEMIA: ICD-10-CM

## 2024-06-18 DIAGNOSIS — I70.0 ATHEROSCLEROSIS OF AORTA: ICD-10-CM

## 2024-06-18 DIAGNOSIS — F03.90 DEMENTIA, UNSPECIFIED DEMENTIA SEVERITY, UNSPECIFIED DEMENTIA TYPE, UNSPECIFIED WHETHER BEHAVIORAL, PSYCHOTIC, OR MOOD DISTURBANCE OR ANXIETY: ICD-10-CM

## 2024-06-18 DIAGNOSIS — Z00.00 ENCOUNTER FOR MEDICARE ANNUAL WELLNESS EXAM: Primary | ICD-10-CM

## 2024-06-18 PROCEDURE — G0439 PPPS, SUBSEQ VISIT: HCPCS | Mod: S$GLB,,, | Performed by: NURSE PRACTITIONER

## 2024-06-18 RX ORDER — PROMETHAZINE HYDROCHLORIDE 25 MG/1
TABLET ORAL
COMMUNITY

## 2024-06-20 NOTE — PATIENT INSTRUCTIONS
Counseling and Referral of Other Preventative  (Italic type indicates deductible and co-insurance are waived)    Patient Name: Cindy Escobedo  Today's Date: 6/20/2024    Health Maintenance       Date Due Completion Date    Annual UACr Never done ---    High Dose Statin Never done ---    RSV Vaccine (Age 60+ and Pregnant patients) (1 - 1-dose 60+ series) Never done ---    Hemoglobin A1c (Prediabetes) 12/06/2024 12/6/2023    Lipid Panel 12/06/2024 12/6/2023    Colorectal Cancer Screening 03/09/2025 3/9/2015    Override on 2/28/2013: Done (per [facility] claims data)    Override on 4/24/2007: Done (Dr. Osmany Connelly:  Repeat screening recommended in 6 years (entire examined colon appeared normal, per report.))    Mammogram 03/18/2025 3/18/2024    Aspirin/Antiplatelet Therapy 06/18/2025 6/18/2024    TETANUS VACCINE 07/06/2026 7/6/2016    DEXA Scan 09/27/2026 9/27/2023        No orders of the defined types were placed in this encounter.    The following information is provided to all patients.  This information is to help you find resources for any of the problems found today that may be affecting your health:                  Living healthy guide: www.[facility].gov      Understanding Diabetes: www.diabetes.org      Eating healthy: www.cdc.gov/healthyweight      [facility] home safety checklist: www.cdc.gov/steadi/patient.html      Agency on Aging: www.goea.[place].gov      Alcoholics anonymous (AA): www.aa.org      Physical Activity: www.chava.nih.gov/zg5xlhf      Tobacco use: www.quitwithusla.org

## 2024-06-20 NOTE — PROGRESS NOTES
Cindy Escobedo presented for an initial Medicare AWV today. The following components were reviewed and updated:    Medical history  Family History  Social history  Allergies and Current Medications  Health Risk Assessment  Health Maintenance  Care Team    **See Completed Assessments for Annual Wellness visit with in the encounter summary    The following assessments were completed:  Depression Screening  Cognitive function Screening-N/A  Timed Get Up Test  Whisper Test    Opioid documentation:  Patient does not have a current opioid prescription.        Vitals:    06/18/24 1319   BP: 132/88   BP Location: Right arm   Patient Position: Sitting   BP Method: Large (Manual)   Pulse: 60   Resp: 16   Temp: 98.4 °F (36.9 °C)   SpO2: 98%   Weight: 63.7 kg (140 lb 8.7 oz)   Height: 5' (1.524 m)     Body mass index is 27.45 kg/m².       Physical Exam  Vitals reviewed.   Constitutional:       General: She is not in acute distress.  Pulmonary:      Effort: Pulmonary effort is normal. No respiratory distress.   Neurological:      Mental Status: She is alert and oriented to person, place, and time.   Psychiatric:         Mood and Affect: Mood normal.         Behavior: Behavior normal.         Thought Content: Thought content normal.         Judgment: Judgment normal.     Diagnoses and health risks identified today and associated recommendations/orders:  1. Encounter for Medicare annual wellness exam  Reviewed and discussed health maintenance.    - Ambulatory Referral/Consult to Enhanced Annual Wellness Visit (eAWV)    2. Major depressive disorder, recurrent, moderate  Stable- continue current treatment and follow up routinely with PCP   Doing better on zoloft 100mg    3. Atherosclerosis of aorta  Stable- continue current treatment and follow up routinely with PCP and cardiology ()  Htn and hld controlled    4. Dyslipidemia  Stable- continue current treatment and follow up routinely with PCP and cardiology  ()  Livalo 2mg    5. Essential hypertension  Stable- continue current treatment and follow up routinely with PCP and cardiology ()  Diovan/hct    6. Mixed hyperlipidemia  Stable- continue current treatment and follow up routinely with PCP and cardiology ()  Livalo 2mg  Lab Results   Component Value Date    CHOL 180 12/06/2023    CHOL 169 03/17/2023    CHOL 166 03/09/2022     Lab Results   Component Value Date    HDL 36 (L) 12/06/2023    HDL 40 03/17/2023    HDL 37 (L) 03/09/2022     Lab Results   Component Value Date    LDLCALC 118.8 12/06/2023    LDLCALC 104.4 03/17/2023    LDLCALC 106.4 03/09/2022     Lab Results   Component Value Date    TRIG 126 12/06/2023    TRIG 123 03/17/2023    TRIG 113 03/09/2022       Lab Results   Component Value Date    CHOLHDL 20.0 12/06/2023    CHOLHDL 23.7 03/17/2023    CHOLHDL 22.3 03/09/2022      7. Dementia, unspecified dementia severity, unspecified dementia type, unspecified whether behavioral, psychotic, or mood disturbance or anxiety  She continues to be confused easily. Her  continues to be her primary caregiver. She does need help with all of her ADLs   She is not on aricept or namenda. She was on aricept but this was stopped due to nasal dripping. Memantine was stopped as well. Unsure why  Last seen by neuro 2022   Continue to follow up with pcp    8. Cerebral aneurysm, nonruptured  Stable- continue current treatment and follow up routinely with PCP and cardiology ()  CTA stable 03/2022.     9. Encounter for preventive health examination  Reviewed and discussed health maintenance.      Provided Cindy with a 5-10 year written screening schedule and personal prevention plan. Recommendations were developed using the USPSTF age appropriate recommendations. Education, counseling, and referrals were provided as needed.  After Visit Summary printed and given to patient which includes a list of additional screenings\tests needed.    I  offered to discuss advanced care planning, including how to pick a person who would make decisions for you if you were unable to make them for yourself, called a health care power of , and what kind of decisions you might make such as use of life sustaining treatments such as ventilators and tube feeding when faced with a life limiting illness recorded on a living will that they will need to know. (How you want to be cared for as you near the end of your natural life)  Patient declined a discussion regarding advance directives at this time. She will complete independently with her family   Missy Dumont, NP

## 2024-06-26 ENCOUNTER — HOSPITAL ENCOUNTER (OUTPATIENT)
Dept: CARDIOLOGY | Facility: HOSPITAL | Age: 74
Discharge: HOME | End: 2024-06-26
Attending: INTERNAL MEDICINE
Payer: MEDICARE

## 2024-06-26 VITALS — WEIGHT: 140 LBS | HEIGHT: 60 IN | BODY MASS INDEX: 27.48 KG/M2

## 2024-06-26 DIAGNOSIS — I65.21 ASYMPTOMATIC STENOSIS OF RIGHT CAROTID ARTERY: Chronic | ICD-10-CM

## 2024-06-26 DIAGNOSIS — I72.6 VERTEBRAL ARTERY ANEURYSM: ICD-10-CM

## 2024-06-26 DIAGNOSIS — I10 ESSENTIAL HYPERTENSION: Chronic | ICD-10-CM

## 2024-06-26 DIAGNOSIS — I70.0 ATHEROSCLEROSIS OF AORTA: ICD-10-CM

## 2024-06-26 LAB
ASCENDING AORTA: 2.84 CM
AV INDEX (PROSTH): 0.93
AV MEAN GRADIENT: 4 MMHG
AV PEAK GRADIENT: 6 MMHG
AV VALVE AREA BY VELOCITY RATIO: 2.46 CM²
AV VALVE AREA: 2.95 CM²
AV VELOCITY RATIO: 0.78
BSA FOR ECHO PROCEDURE: 1.64 M2
CV ECHO LV RWT: 0.4 CM
DOP CALC AO PEAK VEL: 1.21 M/S
DOP CALC AO VTI: 24.9 CM
DOP CALC LVOT AREA: 3.2 CM2
DOP CALC LVOT DIAMETER: 2.01 CM
DOP CALC LVOT PEAK VEL: 0.94 M/S
DOP CALC LVOT STROKE VOLUME: 73.58 CM3
DOP CALCLVOT PEAK VEL VTI: 23.2 CM
E WAVE DECELERATION TIME: 211.42 MSEC
E/A RATIO: 0.83
E/E' RATIO: 12.33 M/S
ECHO LV POSTERIOR WALL: 0.81 CM (ref 0.6–1.1)
FRACTIONAL SHORTENING: 34 % (ref 28–44)
INTERVENTRICULAR SEPTUM: 0.8 CM (ref 0.6–1.1)
LEFT ATRIUM AREA SYSTOLIC (APICAL 2 CHAMBER): 15.01 CM2
LEFT ATRIUM AREA SYSTOLIC (APICAL 4 CHAMBER): 12.15 CM2
LEFT ATRIUM SIZE: 3.31 CM
LEFT ATRIUM VOLUME INDEX MOD: 18.3 ML/M2
LEFT ATRIUM VOLUME MOD: 29.23 CM3
LEFT INTERNAL DIMENSION IN SYSTOLE: 2.66 CM (ref 2.1–4)
LEFT VENTRICLE DIASTOLIC VOLUME INDEX: 45.11 ML/M2
LEFT VENTRICLE DIASTOLIC VOLUME: 72.18 ML
LEFT VENTRICLE END SYSTOLIC VOLUME APICAL 2 CHAMBER: 36.39 ML
LEFT VENTRICLE END SYSTOLIC VOLUME APICAL 4 CHAMBER: 23.16 ML
LEFT VENTRICLE MASS INDEX: 60 G/M2
LEFT VENTRICLE SYSTOLIC VOLUME INDEX: 16.2 ML/M2
LEFT VENTRICLE SYSTOLIC VOLUME: 25.93 ML
LEFT VENTRICULAR INTERNAL DIMENSION IN DIASTOLE: 4.05 CM (ref 3.5–6)
LEFT VENTRICULAR MASS: 96.19 G
LV LATERAL E/E' RATIO: 10.57 M/S
LV SEPTAL E/E' RATIO: 14.8 M/S
LVED V (TEICH): 72.18 ML
LVES V (TEICH): 25.93 ML
LVOT MG: 1.9 MMHG
LVOT MV: 0.66 CM/S
MV PEAK A VEL: 0.89 M/S
MV PEAK E VEL: 0.74 M/S
MV STENOSIS PRESSURE HALF TIME: 61.31 MS
MV VALVE AREA P 1/2 METHOD: 3.59 CM2
OHS CV RV/LV RATIO: 0.87 CM
PISA TR MAX VEL: 1.8 M/S
RA PRESSURE ESTIMATED: 3 MMHG
RIGHT VENTRICLE DIASTOLIC LENGTH: 6.8 CM
RIGHT VENTRICLE DIASTOLIC MID DIMENSION: 2.5 CM
RIGHT VENTRICULAR END-DIASTOLIC DIMENSION: 3.53 CM
RIGHT VENTRICULAR LENGTH IN DIASTOLE (APICAL 4-CHAMBER VIEW): 6.79 CM
RV MID DIAMA: 2.49 CM
RV TB RVSP: 5 MMHG
RV TISSUE DOPPLER FREE WALL SYSTOLIC VELOCITY 1 (APICAL 4 CHAMBER VIEW): 12.07 CM/S
SINUS: 2.69 CM
STJ: 2.42 CM
TDI LATERAL: 0.07 M/S
TDI SEPTAL: 0.05 M/S
TDI: 0.06 M/S
TR MAX PG: 13 MMHG
TRICUSPID ANNULAR PLANE SYSTOLIC EXCURSION: 2.45 CM
TV REST PULMONARY ARTERY PRESSURE: 16 MMHG
Z-SCORE OF LEFT VENTRICULAR DIMENSION IN END DIASTOLE: -1.17
Z-SCORE OF LEFT VENTRICULAR DIMENSION IN END SYSTOLE: -0.47

## 2024-06-26 PROCEDURE — 93306 TTE W/DOPPLER COMPLETE: CPT | Mod: 26,,, | Performed by: INTERNAL MEDICINE

## 2024-06-26 PROCEDURE — 93306 TTE W/DOPPLER COMPLETE: CPT | Mod: PO

## 2024-09-07 ENCOUNTER — PATIENT MESSAGE (OUTPATIENT)
Dept: CARDIOLOGY | Facility: CLINIC | Age: 74
End: 2024-09-07
Payer: MEDICARE

## 2024-09-07 DIAGNOSIS — E78.5 DYSLIPIDEMIA: Primary | Chronic | ICD-10-CM

## 2024-09-09 RX ORDER — PITAVASTATIN CALCIUM 2.09 MG/1
2 TABLET, FILM COATED ORAL NIGHTLY
Qty: 90 TABLET | Refills: 3 | Status: SHIPPED | OUTPATIENT
Start: 2024-09-09 | End: 2025-09-09

## 2024-10-04 ENCOUNTER — PATIENT OUTREACH (OUTPATIENT)
Dept: NEUROLOGY | Facility: CLINIC | Age: 74
End: 2024-10-04
Payer: MEDICARE

## 2024-10-07 ENCOUNTER — PATIENT OUTREACH (OUTPATIENT)
Dept: NEUROLOGY | Facility: CLINIC | Age: 74
End: 2024-10-07
Payer: MEDICARE

## 2024-10-07 NOTE — PROGRESS NOTES
Dementia Care Management    Recruitment Call      Date of Service: 10/07/2024  Care Navigator completing this form: Annette Sultana  Referred by: [unfilled] Tran Hanley DNP   PCP: Kourtney Yi MD    Patient: Cindy Escobedo  MRN: [id number]  : 1950  Age: 74 y.o.  Gender: female  Race: White  Ethnicity: Not  or /a    Objective:   Patient and caregiver potentially eligible for [facility] dementia care management programs.    CTN/SW completed outreach attempt on 10/07/2024 to assess patient/caregiver interest in the program and screen for eligibility.       Outreach Outcome:   Outreach Successful - Pre-Enrollment Call scheduled for 10/28/24 at 1:00 PM       Dyad screened for: CMS GUIDE Model     Currently has private-pay sitter Mon & Thurs, 12-5.  Concerns about pt becoming more combative; having episodes of suddenly not liking being told what to do, wants to be someplace else.   being tx'd for Parkinsonism.

## 2024-10-28 ENCOUNTER — PATIENT OUTREACH (OUTPATIENT)
Dept: NEUROLOGY | Facility: CLINIC | Age: 74
End: 2024-10-28
Payer: MEDICARE

## 2024-10-29 NOTE — PROGRESS NOTES
"  GUIDE Dementia Care Program   Physician Initial Assessment       Virtual Visit - Telehealth  The patient location is: [place] - [place]  The chief complaint leading to consultation is: GUIDE Model Program  Visit type: Audiovisual      ASSESSMENT - CARE MANAGEMENT PLAN & RECOMMENDATIONS     Diagnoses and all orders for this visit:    Other moderate frontotemporal dementia with anxiety    Primary progressive aphasia        Major Neurocognitive Disorder  The patient's clinical presentation meets the criteria for Dementia based on:  -Concern regarding decline in cognitive function and impairment in two or more cognitive domains as diagnosed through a neuropsychology testing performed 10/2022. Refer to note by Emperatriz Yin MD on date 10/2022.   -Interference with independence in functional abilities, patient is currently ADL: dependent  and IADL dependent    -This cognitive deficits are not explained by delirium or major psychiatric disorder  Current stage:  CDR: 3- Severe  FAST: 6D - Urinary incontinence  CMS GUIDE Complexity Tier: Moderate Complexity Dyad - First 6M -        Provider Recommendations:  Mentation  -CTN to discuss joining socially and cognitively engaging activities while avoiding excessive stimulation, fatigue, or overly complex situations.   -CTN to discuss integrating routine and schedule.   -See advanced care planning below  -CTN to discuss MIND Diet and other lifestyle modifications that may help maintain brain health   -CTN to provide reading material   -CTN to discuss measures to ensure safety at home  -CTN to provide information on community-based services  - Educated caregiver on dementia progression and behavioral changes.  - Explained "wanting to go home" behavior as a temporary longing for comfort/security, not reflection on current care.  - Discussed importance of safety with wandering behaviors while allowing safe exploration when possible.  - Recommend allowing safe wandering when " possible to minimize stress.  - Patient to engage in redirecting techniques for challenging behaviors.  - Current caregiver arrangement with sitters 2-3 times per week seems sufficient for now.  - Patient to continue current routine with caregiver support and sitter assistance.  - Long-term care planning initiated, considering options like in-home care vs. assisted living.      What Matters Most  Advance Care Planning   Advance Directives:   Living Will: Yes        Copy on chart: No    [place]POST: No    Do Not Resuscitate Status: Yes    Medical Power of : Yes    Agent's Name:  Santiago Escobedo   Agent's Contact Number:  [phone]    Decision Making:  Family answered questions and Patient unable to communicate due to disease severity/cognitive impairment  Goals of Care: Advance Care Planning    Date: 10/30/2024    During this visit, I engaged the   in the voluntary advance care planning process.  We reviewed the role for advance directives and their purpose in directing future healthcare. The   has power of  for the patient, which he believes includes total power of  including HCPOA. We discussed different extreme health states that she could experience, and reviewed what kind of medical care she would want in those situations.  The patient communicated had communicated that if she were comatose and had little chance of a meaningful recovery, she would not want machines/life-sustaining treatments used. The patient and her  believe they have a Do Not Resuscitate (DNR) order, but there is uncertainty about its documentation in the medical system. They might have a Living Will at home. Discussed that ideally those documents should be uploaded to her chart and that we will provide additional documents for him to complete ([place]POST)  The  endorses that what is most important right now is to focus on spending time at home and maintaining QoL. Patient's , who is the primary  caregiver, expressed a desire to understand the patient's behaviors and find ways to manage them effectively. The patient's two daughters are involved in discussions about future care options, including assisted living or home modifications, but the  prefers to remain in their current home, he expressed a strong aversion to nursing homes based on previous experiences with his parents. Future care plans are being considered, including the possibility of hiring more sitters to allow the patient to remain at home rather than moving to an assisted living facility. Accordingly, we have decided that the best plan to meet the patient's goals includes continuing with treatment.            Medications  -Medication reconciliation performed.   -Total Active High Risk Medications: 2  -sertraline - 100 MG  zolpidem Tab - 5 MG  -- Sleep appears adequate without regular use of sleep aids. will communicate w/ PCP regarding stopping Zolpidem     Mobility  -Falls no  - Mobility Device: none  -Ambulating independently no need for therapy at this time        Nutrition  -BMI: Body mass index is 27.54 kg/m².   -Weight loss: no  -Frailty:yes      Multi complexity  Comorbidities:  has a past medical history of Allergy, Anticoagulant long-term use, Carotid arterial disease, Cataract, Depression, Diverticular disease, Encounter for blood transfusion, Fatty liver, Frontotemporal dementia, HEARING LOSS, History of esophagitis, History of gastritis, HTN (hypertension), Hyperlipidemia, FARA (obstructive sleep apnea), Osteoarthritis, Palpitations, Prediabetes, Primary progressive aphasia, Schatzki's ring, TIA (transient ischemic attack), Valvular heart disease, and Vertebral artery aneurysm.         Caregiver Assessment  -Caregiver:    -Zarit Blossburg Scale Score: 41  -CTN to continue to provide caregiver support       Follow Up with MD in 6m     HISTORY OF PRESENT ILLNESS     Patient is a 74 y.o. year old female presenting with  has a  "past medical history of FTD, CAD, Depression, Diverticular disease,  Fatty liver, , H, History of gastritis, HTN (hypertension), Hyperlipidemia, FARA, Osteoarthritis,  Primary progressive aphasia, Schatzki's ring, TIA (transient ischemic attack), Valvular heart disease, and Vertebral artery aneurysm. Here for Dementia Care evaluation.      Patient is a female with progressive cognitive decline and behavioral changes consistent with dementia. Her  reports she has difficulty following instructions and often cannot recognize or locate objects in her immediate surroundings. She struggles to identify and retrieve items when asked. Patient experiences episodes of not recognizing her home, despite having lived there for 30 years, manifesting as a desire to "go home" when arriving at their house.    Patient has shown increased confusion and agitation, particularly when entering their subdivision or approaching their house. She has had instances of screaming and resisting entering their home. Recently, she walked around the neighborhood unattended, though her  followed her in his truck to ensure her safety.    Patient's ability to perform daily tasks has declined significantly. She requires assistance with bathing and changing, provided by a sitter twice weekly (Mondays and Thursdays from noon to 5:00 PM). Despite these challenges, the patient is eating well and able to walk independently without a cane or walker.    Patient occasionally experiences emotional distress, manifesting as crying spells. She displays a strong need for her 's presence, becoming anxious when he is not visible to her. The  reports difficulty engaging the patient in activities, noting a general lack of interest in most things.    Patient's sleep patterns are generally stable, though she occasionally uses Ambien 5mg for sleep, but very rarely. The  mentions having a sleep disorder himself, which complicates " caregiving.    MMSE 0/30 by Emperatriz Yin MD 10/10/22 she has PPA  Sitter home instead 2 days/week  He is helping most adls, bathing, incont of bladder   No safety concerns acan leave her briefly to go pharmacy  Guns locked, key accessible, but does not think he can access  Food and sleep ok   Upste at times, apathetic, watches TV, hard to find engaging things, wants to go home, refusing to go  Ambulates independently, No falls no UTI, no admission         Dementia Staging   CDR: 3- Severe  FAST: 6D - Urinary incontinence    - walks independently without using a cane or walker  - needs assistance with bathing and changing, provided by a sitter twice weekly  - eats well with no reported issues  - urinary incontinence  - has difficulty following instructions and completing simple tasks    Review of Systems   Review of Systems   Psychiatric/Behavioral:  Positive for confusion.      Review of Symptoms      Symptom Assessment (ESAS 0-10 Scale)  Pain:  0  Dyspnea:  0  Anxiety:  0  Nausea:  0  Depression:  0  Anorexia:  0  Fatigue:  0  Insomnia:  0  Restlessness:  0  Agitation:  2     CAM / Delirium:  Negative  Constipation:  Negative  Diarrhea:  Negative      Functional Assessment Scale (FAST):  6d    Living Arrangements:  Lives with spouse    Psychosocial/Cultural:   See Palliative Psychosocial Note: No  Lives w/  of 55yrs, has sitter 2 afternoons /week, they have 2 daughters, 1 local and 1 in [place].  has Parkinson's  **Primary  to Follow**  Palliative Care  Consult: Yes    Spiritual:  F - Eliza and Belief:  Muslim        GUIDE Self-Report Measures         10/28/2024   Dementia BEHAV5+   Does you care recipient get angry or hostile? Yes   Resist care from others? No   Does your care care recipient see and/ or hear things that no one else can see or hear? No   Does your care recipient act impatiently and cranky? No   Does his or her mood frequently change for no apparent reason? No    Does your care recipient act suspicious without good reason (Example: believes that others are stealing from him or her, or planning to harm him or her in some way)? No   Does your care recipient seem less interested in his or her usual activities or in the activities and plans of others? --   Does your care recipient have trouble sleeping at night? No          10/28/2024   Dementia Assessment   HEALTH LITERACY - PATIENT RESPONSE Not applicable   HEALTH LITERACY - CAREGIVER RESPONSE Extremely   [facility]-HRSN 4   DSA 1   ZBI-22 41   PROMIS-10 - Global Health Raw 14   PROMIS-10 - Mental Health Raw 10          4Ms for Medical Decision-Making in Older Adults    Last Completed EAWV: 6/18/2024    MOBILITY:  Get Up and Go:      6/18/2024     1:52 PM 11/8/2021    12:13 PM 11/5/2020    10:33 AM   Get Up and Go   Trial 1 7 seconds 10 seconds 12 seconds     Activities of Daily Living:      10/28/2024     1:10 PM   Activities of Daily Living   Ambulation Independent   Dressing Assistance Required   Dressing Assistance Moderate   Number of people 1   Toileting Incontinent of bladder;Continent of bowel   Toileting Assistance Wears Briefs   Feeding Assistance Required   Feeding Assistance Minimum   Cleaning home/Chores Assistance Required   Telephone use Assistance Required   Shopping Assistance Required   Paying bills --   Taking meds Assistance Required   If required, who assists the patient with ADLs?      Whisper Test:      6/18/2024     1:52 PM   Whisper Test   Whisper Test Normal     Disability Status:      6/18/2024     2:02 PM   Disability Status   Are you deaf or do you have serious difficulty hearing? N   Are you blind or do you have serious difficulty seeing, even when wearing glasses? N   Because of a physical, mental, or emotional condition, do you have serious difficulty concentrating, remembering, or making decisions? Y   Do you have serious difficulty walking or climbing stairs? N   Do you have difficulty  dressing or bathing? Y   Because of a physical, mental, or emotional condition, do you have difficulty doing errands alone such as visiting a doctor's office or shopping? Y     Nutrition Screenin/18/2024     1:58 PM   Nutrition Screening   Has food intake declined over the past three months due to loss of appetite, digestive problems, chewing or swallowing difficulties? No decrease in food intake   Involuntary weight loss during the last 3 months? No weight loss   Mobility? Goes out   Has the patient suffered psychological stress or acute disease in the past three months? No   Neuropsychological problems? Severe dementia or depression   Body Mass Index (BMI)?  BMI 23 or greater   Screening Score 12   Interpretation Normal nutritional status    Screening Score: 0-7 Malnourished, 8-11 At Risk, 12-14 Normal  Fall Risk:      2024     1:30 PM 6/10/2024     1:15 PM 2024     9:00 AM   Fall Risk Assessment - Outpatient   Mobility Status Ambulatory w/ assistance Ambulatory Ambulatory   Number of falls 0 0 0   Identified as fall risk True False False           MENTATION:   Depression Patient Health Questionnaire:      2024     1:47 PM   Depression Patient Health Questionnaire   Over the last two weeks how often have you been bothered by little interest or pleasure in doing things Several days   Over the last two weeks how often have you been bothered by feeling down, depressed or hopeless Several days   PHQ-2 Total Score 2     Has Dementia Dx: No YES    Has Anxiety Dx: No        MEDICATIONS:  High Risk Medications:  Total Active Medications: 2  sertraline - 100 MG  zolpidem Tab - 5 MG      Medication Reconciliation       Current Outpatient Medications:     aspirin (ECOTRIN) 81 MG EC tablet, Take 81 mg by mouth once daily., Disp: , Rfl:     atenoloL (TENORMIN) 25 MG tablet, TAKE 1 TABLET BY MOUTH EVERY EVENING, Disp: 180 tablet, Rfl: 4    cetirizine 10 mg TbDL, Take 10 mg by mouth once daily. DO NOT  "TAKE MORNING OF SURGERY, Disp: , Rfl:     promethazine (PHENERGAN) 25 MG tablet, Take 1 tablet every 6 hours by oral route., Disp: , Rfl:     sertraline (ZOLOFT) 100 MG tablet, TAKE 1 TABLET(100 MG) BY MOUTH EVERY DAY, Disp: 90 tablet, Rfl: 3    valsartan-hydrochlorothiazide (DIOVAN-HCT) 320-12.5 mg per tablet, TAKE 1 TABLET BY MOUTH EVERY MORNING, Disp: 90 tablet, Rfl: 4    vitamin D (VITAMIN D3) 1000 units Tab, Take 1,000 Units by mouth once daily., Disp: , Rfl:     zolpidem (AMBIEN) 5 MG Tab, Take 1 tablet (5 mg total) by mouth nightly as needed (insomnia). TAKE NIGHT BEFORE SURGERY IF NEEDED, Disp: 30 tablet, Rfl: 0    pitavastatin calcium (LIVALO) 2 mg Tab tablet, Take 1 tablet (2 mg total) by mouth every evening., Disp: 90 tablet, Rfl: 3    Review of patient's allergies indicates:   Allergen Reactions    Codeine Anaphylaxis    Ace inhibitors      Other reaction(s): cough    Atorvastatin Other (See Comments)     Myalgias    Cat hair extract      Other reaction(s): Sneezing  Other reaction(s): Rash    Epinephrine      Other reaction(s): Tachycardia  Other reaction(s): Tachycardia    Hydrocodone-acetaminophen Other (See Comments)     Other reaction(s): Anaphylaxis    Oxycodone hcl-oxycodone-asa      Other reaction(s): Agitation  Other reaction(s): Agitation    Oxytetracycline      Other reaction(s): Fainting  Other reaction(s): Fainting    Rosuvastatin Other (See Comments)     Other reaction(s): Muscle pain    Sulfamethoxazole-trimethoprim      Other reaction(s): "Cracked lips"  Other reaction(s): "Cracked lips"         Physical Exam     BP: (!) 153/82 (10/30/24 1403)  Physical Exam  Constitutional:       General: She is not in acute distress.     Appearance: She is not toxic-appearing.   HENT:      Head: Normocephalic.      Right Ear: External ear normal.      Left Ear: External ear normal.      Mouth/Throat:      Mouth: Mucous membranes are moist.   Eyes:      General: No scleral icterus.     Extraocular " Movements: Extraocular movements intact.   Pulmonary:      Effort: No respiratory distress.   Skin:     Coloration: Skin is not pale.   Neurological:      Mental Status: She is alert. Mental status is at baseline. She is disoriented.      Comments: aphasia   Psychiatric:         Mood and Affect: Mood normal.         Behavior: Behavior normal.           Thank you for allowing us to participate in the care of your patient. Please do not hesitate to contact the GUIDE team with any questions or concerns.     Signature: Kathy Millre MD

## 2024-10-30 ENCOUNTER — CLINICAL SUPPORT (OUTPATIENT)
Dept: NEUROLOGY | Facility: CLINIC | Age: 74
End: 2024-10-30
Payer: MEDICARE

## 2024-10-30 VITALS — SYSTOLIC BLOOD PRESSURE: 153 MMHG | WEIGHT: 141 LBS | DIASTOLIC BLOOD PRESSURE: 82 MMHG | BODY MASS INDEX: 27.54 KG/M2

## 2024-10-30 DIAGNOSIS — Z98.890 S/P CAROTID ENDARTERECTOMY: ICD-10-CM

## 2024-10-30 DIAGNOSIS — I72.6 VERTEBRAL ARTERY ANEURYSM: ICD-10-CM

## 2024-10-30 DIAGNOSIS — E78.5 DYSLIPIDEMIA: Chronic | ICD-10-CM

## 2024-10-30 DIAGNOSIS — F02.80 PRIMARY PROGRESSIVE APHASIA: ICD-10-CM

## 2024-10-30 DIAGNOSIS — G31.09 OTHER MODERATE FRONTOTEMPORAL DEMENTIA WITH ANXIETY: Primary | ICD-10-CM

## 2024-10-30 DIAGNOSIS — G31.01 PRIMARY PROGRESSIVE APHASIA: ICD-10-CM

## 2024-10-30 DIAGNOSIS — I10 ESSENTIAL HYPERTENSION: Chronic | ICD-10-CM

## 2024-10-30 DIAGNOSIS — F02.B4 OTHER MODERATE FRONTOTEMPORAL DEMENTIA WITH ANXIETY: Primary | ICD-10-CM

## 2024-10-30 PROCEDURE — G0520 PR MGMT NEW PT-CAREGIVER DYAD, WITH DEMENTIA, MODERATE COMPLEXITY: HCPCS | Mod: 95,,, | Performed by: STUDENT IN AN ORGANIZED HEALTH CARE EDUCATION/TRAINING PROGRAM

## 2024-10-30 NOTE — PROGRESS NOTES
"Dementia Care Program   Care Navigator - Initial Assessment       Virtual Visit - Telehealth  The patient location is: Home  The chief complaint leading to consultation is: GUIDE Model Program  Visit type: Audiovisual      Date of Service: 10/30/2024    Care Navigator completing this form: Annette Weinberg  Referring Provider: Referring Provider: Dr. Auguste  PCP: Kourtney Yi MD  Care Team: Patient Care Team:  Kourtney Yi MD as PCP - General (Family Medicine)  Wicho Rico MD as Consulting Physician (Gastroenterology)  Eveline Soria, RN (Inactive) as Registered Nurse (Family Medicine)  Osmany Connelly MD as Consulting Physician (Colon and Rectal Surgery)  Adama Nassar MD as Consulting Physician (Neurology)  Tej Kirby OD as Consulting Physician (Optometry)  Annette Weinberg LCSW as  (Neurology)  Kathy Miller MD as Consulting Physician (Internal Medicine)   Program Status: Active  CMS Complexity Tier: Moderate Complexity Dyad - First 6M -   Was Respite Approved? Yes      Patient: Cindy Escobedo  MRN: [id number]  : 1950  Age: 74 y.o.  Gender: female  Race: White  Ethnicity: Not  or /a        Objective: Initial/Annual Assessment for the CMS GUIDE Model dementia care management program.    Cindy Escobedo is a 74 y.o. female who presents for Dementia Care Management initial visit. Spoke to Caregiver Santiago Escobedo  (Spouse).      CTN/SW Action Items:  Plan to address strategies for exploring Adult Day Program  Plan to educate on pt wanting to "go home"  Address LaPOST ~2-3 months    Next call scheduled for: 24 at at 10:00 AM       Medications     Medication list reviewed and updated 10/30/2024     Total Active High Risk Medications:   2    sertraline - 100 MG  zolpidem Tab - 5 MG    Review of patient's allergies indicates:   Allergen Reactions    Codeine Anaphylaxis    Ace inhibitors      Other reaction(s): cough    " "Atorvastatin Other (See Comments)     Myalgias    Cat hair extract      Other reaction(s): Sneezing  Other reaction(s): Rash    Epinephrine      Other reaction(s): Tachycardia  Other reaction(s): Tachycardia    Hydrocodone-acetaminophen Other (See Comments)     Other reaction(s): Anaphylaxis    Oxycodone hcl-oxycodone-asa      Other reaction(s): Agitation  Other reaction(s): Agitation    Oxytetracycline      Other reaction(s): Fainting  Other reaction(s): Fainting    Rosuvastatin Other (See Comments)     Other reaction(s): Muscle pain    Sulfamethoxazole-trimethoprim      Other reaction(s): "Cracked lips"  Other reaction(s): "Cracked lips"           Advanced Care Planning     Advance Care Planning   Advance Care Planning       Goals of Care: Caregiver wants to understand what pt is doing and why; comfort and qualify of life, care for pt at home.      Surveys    The following surveys were documented in Flowsheets:    Monthly Tracking (Falls, UTIs, Hospital encounters)  CDR and/or FAST (assessed by provider)  NOTE:  [facility] not completed due to pt having aphasia; last MMSE on 10/10/22 was 0/30                    "

## 2024-10-30 NOTE — PROGRESS NOTES
GUIDE Patient Assessment and Alignment Form     Results from (check one):  [] Initial Assessment  If Initial Assessment, is this patient an existing patient of the practice or a new patient?   [] Existing patient  [x] New patient    If Initial Assessment, provide a patient referral source:  [x] Referred by a health care provider  [] Referred by a community-based organization  [] Self-referral  [] Re-assessment  If Re-assessment, provide reason:  [] Annual re-assessment  [] Re-assessment due to change in severity of patient's dementia  [] Re-assessment due to change in caregiver status  If Re-assessment due to change in caregiver status, please specify the reason for change:  [] New primary caregiver  [] Loss of caregiver so patient is without a caregiver  [] Patient change in residence  [] Other ___________________    Date of current assessment: 10/30/2024         Patient first name: Cindy   Patient middle name (if applicable): OJ  Patient last name: Gregg  Patient Address: [address] [place] [address] [address] [facility] [address]    Email: [email or web address]   Phone Number: [phone]      [x] Check if mobile/cellular phone  Patient resides in:   [x] Private residence  [] Assisted living facility  [] Memory care program (excludes nursing home level of care)      [x] Check box to confirm patient is not a long-term nursing home resident.     Patient YOB: 1950  Patient Medicare Beneficiary Identifier: [id number]    Patient Medicaid ID number (if applicable): na  Patient dementia stage:   CDR: 3 (add total score from Dr. Anglin's note)   FAST: 6D (add total score from Dr. Anglin's note)   Patient PROMIS Global Health: 24 (Combined Physical and Mental Health scores - review flowsheet to retrieve scores).     Does patient have a primary care provider: [x] Yes  [] No  If Yes, name of the primary care provider: Kourtney Yi MD  Phone number of primary care provider: [phone]    Does patient have caregiver, defined  as a relative, or an unpaid nonrelative, who assists the beneficiary with activities of daily living and/or instrumental activities of daily living? Available Caregiver(s): Yes - One    If yes:   Primary caregiver first name: Santiago      Primary caregiver last name: Gregg  Primary caregiver address: [address] [place] [address] [address] [id number]     Primary caregiver email: na  Primary caregiver phone Number: [phone]  [x] Check if mobile/cellular phone  Primary caregiver date of birth: [birth year]  Primary caregiver gender: Male  Primary caregiver race/ethnicity: White    What is primary caregiver's relationship to patient? Spouse    Does primary caregiver live with patient? Yes    Is primary caregiver a Medicare beneficiary? Yes   If yes, what is the primary caregiver's Medicare Beneficiary Identifier: [id number]    Primary caregiver Zarit [place] Interview: 41  How long has the primary caregiver been in their caregiver role?  (enter length of time in months and years) 0 months 5 years OR [] Unsure/cannot recall      In my clinical judgment, the assessed patient meets the National [place] [employer] Aging-Alzheimer's Association diagnostic guidelines for dementia and/or the DSM-5 diagnostic guidelines for major neurocognitive disorder, or I have received a written report (electronic or hard-copy) of a documented dementia diagnosis from another Medicare qualified health professional.    [] Yes, the patient meets the National [place] on Aging-Alzheimer's Association diagnostic guidelines for dementia and/or the DSM-5 diagnostic guidelines for major neurocognitive disorder.  [x] Yes, I received a written report of a documented dementia diagnosis  [] No, I cannot attest to either statement        Attesting clinician:   First name: Dr. Chavez  Middle Name: n/a  Last name: Stephen  National Provider Identification (NPI) number for attesting clinician: [id number]  GUIDE Model Participant Taxpayer Identification Number TIN:  [id number]

## 2024-10-30 NOTE — LETTER
Dear Kourtney Yi MD,    [facility] is now a participating site for Medicare's new GUIDE Model - a program designed to provide extra help and support for patients living with dementia and their caregivers. For more information on the program, please see attached.     Cindy Escobedo was seen today by our geriatrician, Dr. Kathy Miller, as part of the initial assessment for the GUIDE Model. If approved, your patient and their caregiver will receive a home assessment, an annual Geriatric assessment, and will be assigned a Care Navigator. The Care Navigator will provide education and strategies, supportive counseling, and care coordination. In addition to this, the patient may be eligible for In-Home Respite Care IF Medicare criteria is met.     Below are the recommendations made by Dr. Miller, for your consideration.     Replace prn Ambien with an alternative sleep aid        If you have questions, please do not hesitate to reach out to me or Dr. Miller. We look forward to supporting Cindy Escobedo dementia care along with you.      Sincerely,  Annette Weinberg LCSW  [facility]   Phone: [phone]  Email: [email or web address]  Fax: [phone]

## 2024-10-31 RX ORDER — ATENOLOL 25 MG/1
TABLET ORAL
Qty: 180 TABLET | Refills: 4 | Status: SHIPPED | OUTPATIENT
Start: 2024-10-31

## 2024-11-01 ENCOUNTER — PATIENT OUTREACH (OUTPATIENT)
Dept: NEUROLOGY | Facility: CLINIC | Age: 74
End: 2024-11-01
Payer: MEDICARE

## 2024-11-05 ENCOUNTER — PATIENT OUTREACH (OUTPATIENT)
Dept: NEUROLOGY | Facility: CLINIC | Age: 74
End: 2024-11-05
Payer: MEDICARE

## 2024-11-05 DIAGNOSIS — G31.09 OTHER MODERATE FRONTOTEMPORAL DEMENTIA WITH ANXIETY: Primary | ICD-10-CM

## 2024-11-05 DIAGNOSIS — F02.B4 OTHER MODERATE FRONTOTEMPORAL DEMENTIA WITH ANXIETY: Primary | ICD-10-CM

## 2024-11-05 NOTE — PROGRESS NOTES
Dementia Care Program - GUIDE  Care Navigator - Monthly Follow Up      Virtual Visit - Telehealth  The patient location is: Home  The chief complaint leading to consultation is: GUIDE Model Program  Visit type: Audio Only     Date of Service: 24  Enrollment date: 10/30/24  Program Status: Active  CMS Complexity Tier: Moderate Complexity Dyad - First 6M -   Was Respite Approved? Yes    Care Navigator completing this form: Annette Weinberg  PCP: Kourtney Yi MD  Care Team: Patient Care Team:  Kourtney Yi MD as PCP - General (Family Medicine)  Wicho Rico MD as Consulting Physician (Gastroenterology)  Eveline Soria, RN (Inactive) as Registered Nurse (Family Medicine)  Osmany Connelly MD as Consulting Physician (Colon and Rectal Surgery)  Adama Nassar MD as Consulting Physician (Neurology)  Tej Kirby OD as Consulting Physician (Optometry)  Annette Weinberg LCSW as  (Neurology)  Kathy Miller MD as Consulting Physician (Internal Medicine)     Patient: Cindy Escobedo  MRN: [id number]  : 1950  Age: 74 y.o.  Gender: female  Race: White  Ethnicity: Not  or /a    Objective: Care Navigator/Social Work follow up as part of the GUIDE dementia care management program.    Spoke with Caregiver Ihsan Coburn  (Daughter).      ASSESSMENT & PLAN     Diagnoses and all orders for this visit:    Other moderate frontotemporal dementia with anxiety          Identified Needs Resources & Recommendations   Dtr concerned about pt's 's stress levels and health issues; he gets frustrated, yells  Recent crisis when pt took car keys and tried to leave,  went to stop her and had to physically intervene, they both fell  Sitters there twice weekly; dtr would like  to increase sitters   not getting adequate sleep  Dtrs have offered to sleep at the house at times to help, but both have full-time jobs Dtr will address concerns  to pt's   Encouraged dtr to access caregiver supports groups                  Dtr Ihsan lives about 10 minutes away; is back-up PoA after ; dtr makes sure pt and  have groceries, etc  Ring cameras at house, dtrs monitor  Sitters twice weekly  Dtrs are [employer] teachers and understand the strategies which are similar to what they use in their work, eg redirection, avoiding escalation, etc  *For more information, refer to Dementia Care Plan [hyperlink at the end of this note]      CTN/SW Action Items:   Provide ongoing education to  on brain changes, strategies to manage pt's behaviors, importance of respite, encourage increase in sitter services.  Plan: Monthly follow up for dementia care management as part of the GUIDE Model.    Next visit scheduled for: 11/14/24 at 10:30 (2-week check-in). Caregiver knows how to reach CTN/SW and is encouraged to do so before the next scheduled call, if needed.    MONTHLY TRACKING

## 2024-11-15 ENCOUNTER — PATIENT OUTREACH (OUTPATIENT)
Dept: NEUROLOGY | Facility: CLINIC | Age: 74
End: 2024-11-15
Payer: MEDICARE

## 2024-11-15 DIAGNOSIS — G31.09 OTHER MODERATE FRONTOTEMPORAL DEMENTIA WITH ANXIETY: Primary | ICD-10-CM

## 2024-11-15 DIAGNOSIS — F02.B4 OTHER MODERATE FRONTOTEMPORAL DEMENTIA WITH ANXIETY: Primary | ICD-10-CM

## 2024-11-15 NOTE — PROGRESS NOTES
"Dementia Care Program - GUIDE  Care Navigator - Monthly Follow Up      Virtual Visit - Telehealth  The patient location is: Home  The chief complaint leading to consultation is: GUIDE Model Program  Visit type: Audio Only       Date of Service: 11/15/2024  Enrollment date: 10/30/24  Program Status: Active  CMS Complexity Tier: Moderate Complexity Dyad - First 6M -   Was Respite Approved? Yes    Care Navigator completing this form: Annette Weinberg  PCP: Kourtney Yi MD  Care Team: Patient Care Team:  Kourtney Yi MD as PCP - General (Family Medicine)  Wicho Rico MD as Consulting Physician (Gastroenterology)  Eveline Soria RN (Inactive) as Registered Nurse (Family Medicine)  Osmany Connelly MD as Consulting Physician (Colon and Rectal Surgery)  Adama Nassar MD as Consulting Physician (Neurology)  Tej Kirby OD as Consulting Physician (Optometry)  Annette Weinberg LCSW as  (Neurology)  Kathy Miller MD as Consulting Physician (Internal Medicine)     Patient: Cindy Escobedo  MRN: [id number]  : 1950  Age: 74 y.o.  Gender: female  Race: White  Ethnicity: Not  or /a    Objective: Care Navigator/Social Work follow up as part of the GUIDE dementia care management program.    Spoke with Caregiver Santiago Escobedo  (Spouse).      ASSESSMENT & PLAN     Cindy Navarro" was seen today for dementia.    Diagnoses and all orders for this visit:    Other moderate frontotemporal dementia with anxiety          Identified Needs Resources & Recommendations   N/A - updated  on enrollment status                  *For more information, refer to Dementia Care Plan [hyperlink at the end of this note]      CTN/SW Action Items:   Refer for Home Safety Assessment - done  Refer for Respite - Done  Send CMS Welcome Letter and Welcome to GUIDE Handout - done  Send next call details per email per  request - done    Plan: Monthly follow up for dementia " care management as part of the GUIDE Model.    Next visit scheduled for: 12/2/24 at 11:00 AM. Caregiver knows how to reach CTN/SW and is encouraged to do so before the next scheduled call, if needed.        MONTHLY TRACKING

## 2024-12-02 ENCOUNTER — PATIENT OUTREACH (OUTPATIENT)
Dept: NEUROLOGY | Facility: CLINIC | Age: 74
End: 2024-12-02
Payer: MEDICARE

## 2024-12-02 DIAGNOSIS — G31.09 OTHER MODERATE FRONTOTEMPORAL DEMENTIA WITH ANXIETY: Primary | ICD-10-CM

## 2024-12-02 DIAGNOSIS — F02.B4 OTHER MODERATE FRONTOTEMPORAL DEMENTIA WITH ANXIETY: Primary | ICD-10-CM

## 2024-12-02 PROCEDURE — G0520 PR MGMT NEW PT-CAREGIVER DYAD, WITH DEMENTIA, MODERATE COMPLEXITY: HCPCS | Mod: ,,, | Performed by: STUDENT IN AN ORGANIZED HEALTH CARE EDUCATION/TRAINING PROGRAM

## 2024-12-02 NOTE — PROGRESS NOTES
Dementia Care Program - GUIDE  Care Navigator - Monthly Follow Up      Virtual Visit - Telehealth  The patient location is: Home  The chief complaint leading to consultation is: GUIDE Model Program  Visit type: Audio Only       Date of Service: 2024  Enrollment date: 10/30/24  Program Status: Active  CMS Complexity Tier: Moderate Complexity Dyad - First 6M -   Was Respite Approved? Yes    Care Navigator completing this form: Annette Weinberg  PCP: Kourtney Yi MD  Care Team: Patient Care Team:  Kourtney Yi MD as PCP - General (Family Medicine)  Wicho Rico MD as Consulting Physician (Gastroenterology)  Eveilne Soria, RN (Inactive) as Registered Nurse (Family Medicine)  Osmany Connelly MD as Consulting Physician (Colon and Rectal Surgery)  Adama Nassar MD as Consulting Physician (Neurology)  Tej Kirby OD as Consulting Physician (Optometry)  Annette Weinberg LCSW as  (Neurology)  Kathy Miller MD as Consulting Physician (Internal Medicine)     Patient: Cindy Escobedo  MRN: [id number]  : 1950  Age: 74 y.o.  Gender: female  Race: White  Ethnicity: Not  or /a    Objective: Care Navigator/Social Work follow up as part of the GUIDE dementia care management program.    Spoke with Caregiver Santiago Lockhart  (Spouse).      ASSESSMENT & PLAN     Diagnoses and all orders for this visit:    Other moderate frontotemporal dementia with anxiety          Identified Needs Resources & Recommendations   Pt becomes distressed as they approach the house; says she wants to go home; refuses to get out of car and go into house; if  leaves pt to go inside, pt has wandered down the street.     plans to ask PCP for a referral to psychiatry     dc'd his own PT due to challenges of getting to appts while managing pt's care     working on keeping his voice calm; at times is still trying to use logic, challenge pt to remember,  etc Provided education; brainstormed strategies; adjusting expectations; enc  to work on clearing out garage so that he can park inside and allow pt to take as long as she wants to come inside without risk of wandering away from house    Explored 's hopes and expectations for psych consult, provided education on limitations of pt's ability to utilize the service in the way he hopes, and possible long wait for appt    Encouraged  to prioritize his care, and to do home PT exercises    Reviewed general strategies, not correcting, accepting lack of logic, joining pt where she is                 *For more information, refer to Dementia Care Plan [hyperlink at the end of this note]    CTN/SW Action Items:   N/A      Plan: Monthly follow up for dementia care management as part of the GUIDE Model.    Next visit scheduled for: 1/6/25 at 11:00 AM. Caregiver knows how to reach CTN/SW and is encouraged to do so before the next scheduled call, if needed.        MONTHLY TRACKING         12/2/2024   [place] MONTHLY TRACKING   Falls 1

## 2024-12-10 ENCOUNTER — LAB VISIT (OUTPATIENT)
Dept: LAB | Facility: HOSPITAL | Age: 74
End: 2024-12-10
Attending: FAMILY MEDICINE
Payer: MEDICARE

## 2024-12-10 DIAGNOSIS — I10 HYPERTENSION, ESSENTIAL: ICD-10-CM

## 2024-12-10 DIAGNOSIS — R73.03 PREDIABETES: ICD-10-CM

## 2024-12-10 DIAGNOSIS — E78.5 HYPERLIPIDEMIA, UNSPECIFIED HYPERLIPIDEMIA TYPE: ICD-10-CM

## 2024-12-10 LAB
ALBUMIN SERPL BCP-MCNC: 3.8 G/DL (ref 3.5–5.2)
ALP SERPL-CCNC: 85 U/L (ref 40–150)
ALT SERPL W/O P-5'-P-CCNC: 21 U/L (ref 10–44)
ANION GAP SERPL CALC-SCNC: 10 MMOL/L (ref 8–16)
AST SERPL-CCNC: 20 U/L (ref 10–40)
BASOPHILS # BLD AUTO: 0.04 K/UL (ref 0–0.2)
BASOPHILS NFR BLD: 0.7 % (ref 0–1.9)
BILIRUB SERPL-MCNC: 1.6 MG/DL (ref 0.1–1)
BUN SERPL-MCNC: 13 MG/DL (ref 8–23)
CALCIUM SERPL-MCNC: 9.4 MG/DL (ref 8.7–10.5)
CHLORIDE SERPL-SCNC: 105 MMOL/L (ref 95–110)
CHOLEST SERPL-MCNC: 147 MG/DL (ref 120–199)
CHOLEST/HDLC SERPL: 4.2 {RATIO} (ref 2–5)
CO2 SERPL-SCNC: 25 MMOL/L (ref 23–29)
CREAT SERPL-MCNC: 0.9 MG/DL (ref 0.5–1.4)
DIFFERENTIAL METHOD BLD: ABNORMAL
EOSINOPHIL # BLD AUTO: 0.1 K/UL (ref 0–0.5)
EOSINOPHIL NFR BLD: 1.5 % (ref 0–8)
ERYTHROCYTE [DISTWIDTH] IN BLOOD BY AUTOMATED COUNT: 13.2 % (ref 11.5–14.5)
EST. GFR  (NO RACE VARIABLE): >60 ML/MIN/1.73 M^2
ESTIMATED AVG GLUCOSE: 117 MG/DL (ref 68–131)
GLUCOSE SERPL-MCNC: 120 MG/DL (ref 70–110)
HBA1C MFR BLD: 5.7 % (ref 4–5.6)
HCT VFR BLD AUTO: 41.8 % (ref 37–48.5)
HDLC SERPL-MCNC: 35 MG/DL (ref 40–75)
HDLC SERPL: 23.8 % (ref 20–50)
HGB BLD-MCNC: 14.2 G/DL (ref 12–16)
IMM GRANULOCYTES # BLD AUTO: 0.02 K/UL (ref 0–0.04)
IMM GRANULOCYTES NFR BLD AUTO: 0.4 % (ref 0–0.5)
LDLC SERPL CALC-MCNC: 87 MG/DL (ref 63–159)
LYMPHOCYTES # BLD AUTO: 1.3 K/UL (ref 1–4.8)
LYMPHOCYTES NFR BLD: 22.8 % (ref 18–48)
MCH RBC QN AUTO: 31.1 PG (ref 27–31)
MCHC RBC AUTO-ENTMCNC: 34 G/DL (ref 32–36)
MCV RBC AUTO: 92 FL (ref 82–98)
MONOCYTES # BLD AUTO: 0.5 K/UL (ref 0.3–1)
MONOCYTES NFR BLD: 9.9 % (ref 4–15)
NEUTROPHILS # BLD AUTO: 3.6 K/UL (ref 1.8–7.7)
NEUTROPHILS NFR BLD: 64.7 % (ref 38–73)
NONHDLC SERPL-MCNC: 112 MG/DL
NRBC BLD-RTO: 0 /100 WBC
PLATELET # BLD AUTO: 217 K/UL (ref 150–450)
PMV BLD AUTO: 10.9 FL (ref 9.2–12.9)
POTASSIUM SERPL-SCNC: 4.2 MMOL/L (ref 3.5–5.1)
PROT SERPL-MCNC: 7.2 G/DL (ref 6–8.4)
RBC # BLD AUTO: 4.56 M/UL (ref 4–5.4)
SODIUM SERPL-SCNC: 140 MMOL/L (ref 136–145)
TRIGL SERPL-MCNC: 125 MG/DL (ref 30–150)
TSH SERPL DL<=0.005 MIU/L-ACNC: 2.53 UIU/ML (ref 0.4–4)
WBC # BLD AUTO: 5.48 K/UL (ref 3.9–12.7)

## 2024-12-10 PROCEDURE — 84443 ASSAY THYROID STIM HORMONE: CPT | Performed by: FAMILY MEDICINE

## 2024-12-10 PROCEDURE — 36415 COLL VENOUS BLD VENIPUNCTURE: CPT | Mod: PO | Performed by: FAMILY MEDICINE

## 2024-12-10 PROCEDURE — 83036 HEMOGLOBIN GLYCOSYLATED A1C: CPT | Performed by: FAMILY MEDICINE

## 2024-12-10 PROCEDURE — 85025 COMPLETE CBC W/AUTO DIFF WBC: CPT | Performed by: FAMILY MEDICINE

## 2024-12-10 PROCEDURE — 80061 LIPID PANEL: CPT | Performed by: FAMILY MEDICINE

## 2024-12-10 PROCEDURE — 80053 COMPREHEN METABOLIC PANEL: CPT | Performed by: FAMILY MEDICINE

## 2024-12-12 ENCOUNTER — OFFICE VISIT (OUTPATIENT)
Dept: FAMILY MEDICINE | Facility: CLINIC | Age: 74
End: 2024-12-12
Payer: MEDICARE

## 2024-12-12 ENCOUNTER — TELEPHONE (OUTPATIENT)
Dept: NEUROLOGY | Facility: CLINIC | Age: 74
End: 2024-12-12
Payer: MEDICARE

## 2024-12-12 VITALS
OXYGEN SATURATION: 97 % | TEMPERATURE: 98 F | HEART RATE: 57 BPM | RESPIRATION RATE: 16 BRPM | BODY MASS INDEX: 27.34 KG/M2 | DIASTOLIC BLOOD PRESSURE: 70 MMHG | HEIGHT: 60 IN | WEIGHT: 139.25 LBS | SYSTOLIC BLOOD PRESSURE: 118 MMHG

## 2024-12-12 DIAGNOSIS — E78.5 HYPERLIPIDEMIA, UNSPECIFIED HYPERLIPIDEMIA TYPE: ICD-10-CM

## 2024-12-12 DIAGNOSIS — F02.80 FRONTOTEMPORAL DEMENTIA: Primary | ICD-10-CM

## 2024-12-12 DIAGNOSIS — Z23 IMMUNIZATION DUE: ICD-10-CM

## 2024-12-12 DIAGNOSIS — G31.09 FRONTOTEMPORAL DEMENTIA: Primary | ICD-10-CM

## 2024-12-12 DIAGNOSIS — I10 HYPERTENSION, ESSENTIAL: ICD-10-CM

## 2024-12-12 PROCEDURE — G2211 COMPLEX E/M VISIT ADD ON: HCPCS | Mod: S$GLB,,, | Performed by: FAMILY MEDICINE

## 2024-12-12 PROCEDURE — 99214 OFFICE O/P EST MOD 30 MIN: CPT | Mod: S$GLB,,, | Performed by: FAMILY MEDICINE

## 2024-12-12 PROCEDURE — 90653 IIV ADJUVANT VACCINE IM: CPT | Mod: S$GLB,,, | Performed by: FAMILY MEDICINE

## 2024-12-12 PROCEDURE — G0008 ADMIN INFLUENZA VIRUS VAC: HCPCS | Mod: S$GLB,,, | Performed by: FAMILY MEDICINE

## 2024-12-12 RX ORDER — TRAZODONE HYDROCHLORIDE 50 MG/1
50 TABLET ORAL NIGHTLY
Qty: 30 TABLET | Refills: 1 | Status: SHIPPED | OUTPATIENT
Start: 2024-12-12 | End: 2025-12-12

## 2024-12-12 NOTE — TELEPHONE ENCOUNTER
Spoke with patients  and scheduled appointment for patient in memory clinic. Nurse visit scheduled for 10:30 on 1/29/25 and appointment with Dr. Yin scheduled for 11 am on 1/29/25

## 2024-12-12 NOTE — PROGRESS NOTES
Subjective:       Patient ID: Cindy Escobedo is a 74 y.o. female.    Chief Complaint: Follow-up    History of Present Illness    CHIEF COMPLAINT:  Patient presents today for follow-up of dementia and associated behavioral issues.  She is here today with her  who has Parkinson's and her daughter who lives and works in close proximity to her parent's home.  She has not seen her neurologist Dr. Yin since October of 2022.    BEHAVIORAL ISSUES AND AGGRESSION:  She demonstrates increasing resistance to exiting vehicles and entering the home upon arrival. She exhibits confusion and disruptive behavior, including throwing items from the glove compartment while in the car. She has wandering tendencies, attempting to walk towards the street with personal items. Aggression occurs primarily occurs in the evenings. She experiences confusion about her location, expressing a desire to return to her childhood home. Family reports difficulty managing these behaviors at home and in public settings, such as restaurants. Aggression appears less frequent with her daughter and sitter present.  She does not want to go to sleep at night.  She takes Sertraline in the morning for mood stabilization, which reportedly reduces aggressive behaviors when taken consistently    ACTIVITIES OF DAILY LIVING:  She requires assistance with dressing, including help putting on shirts and selecting appropriate footwear. She demonstrates trouble using utensils, with a recent incident where she attempted to eat using the handle of a fork instead of the tines. Toileting has become challenging, necessitating assistance. She occasionally has accidents before reaching the bathroom, as evidenced by a recent incident where she soiled herself before reaching the toilet. Her caregiver expresses concern about the increasing need for constant supervision due to these difficulties. Her caregiver manages and organizes her medications, ensuring they are laid  out in pillow cases and administered as prescribed.    CAREGIVER SUPPORT AND HOME CARE:  She was evaluated for the GUIDE program, designed for dementia patients and their caregivers. The program conducted a home assessment and approved additional care hours. She currently has a sitter from [facility] who comes twice weekly, on Mondays and Thursdays, for 5-hour sessions. The GUIDE program has approved an additional 21 days of care, with options for either 4-hour or 8-hour visits, to be used by June. Her family has not yet established a schedule for these additional care hours. She reportedly enjoys the company of her current sitter.    HYPERLIPIDEMIA:  She is taking Livalo at night for cholesterol management.  Earlier this month, her t total cholesterol was 147 and her LDL was 87    HYPERTENSION:  Today her blood pressure is 118/70.  She is taking atenolol and diovan HCT. Concern is expressed about the large size of the diovan HCT pill, which may present swallowing difficulties in the future. Her caregiver has been breaking the pill in half to facilitate administration.     PRE DIABETES:  Her pre diabetes remains stable with an A1c earlier this month of 5.7%.     SAFETY CONCERNS:  Her family reports implementing safety measures including safety chains and alarms on all doors. She has a medic alert bracelet, which needs to be renewed due to allergies. Family is considering obtaining a GPS bracelet for her in case of wandering. There are concerns about her safety, particularly regarding potential wandering incidents becoming more frequent and severe.         Review of Systems   Unable to perform ROS: Dementia   Constitutional:  Negative for appetite change, chills, diaphoresis, fatigue, fever and unexpected weight change.   HENT:  Negative for congestion, ear pain, postnasal drip, rhinorrhea, sinus pressure, sneezing, sore throat and trouble swallowing.    Eyes:  Negative for pain, discharge and visual disturbance.    Respiratory:  Negative for cough, chest tightness, shortness of breath and wheezing.    Cardiovascular:  Negative for chest pain, palpitations and leg swelling.   Gastrointestinal:  Negative for abdominal distention, abdominal pain, blood in stool, constipation, diarrhea, nausea and vomiting.   Skin:  Negative for rash.         Objective:      Physical Exam  Constitutional:       General: She is not in acute distress.     Appearance: Normal appearance. She is well-developed.   HENT:      Head: Normocephalic and atraumatic.      Right Ear: Hearing, tympanic membrane, ear canal and external ear normal.      Left Ear: Hearing, tympanic membrane, ear canal and external ear normal.      Nose: Nose normal.      Mouth/Throat:      Mouth: No oral lesions.      Pharynx: No oropharyngeal exudate or posterior oropharyngeal erythema.   Eyes:      General: Lids are normal. No scleral icterus.     Extraocular Movements: Extraocular movements intact.      Conjunctiva/sclera: Conjunctivae normal.      Pupils: Pupils are equal, round, and reactive to light.   Neck:      Thyroid: No thyroid mass or thyromegaly.      Vascular: No carotid bruit.   Cardiovascular:      Rate and Rhythm: Normal rate and regular rhythm. No extrasystoles are present.     Chest Wall: PMI is not displaced.      Heart sounds: Normal heart sounds. No murmur heard.     No gallop.   Pulmonary:      Effort: Pulmonary effort is normal. No accessory muscle usage or respiratory distress.      Breath sounds: Normal breath sounds.   Abdominal:      General: Bowel sounds are normal. There is no abdominal bruit.      Palpations: Abdomen is soft.      Tenderness: There is no abdominal tenderness. There is no rebound.   Musculoskeletal:      Cervical back: Normal range of motion and neck supple.   Lymphadenopathy:      Head:      Right side of head: No submental or submandibular adenopathy.      Left side of head: No submental or submandibular adenopathy.      Cervical:       Right cervical: No superficial, deep or posterior cervical adenopathy.     Left cervical: No superficial, deep or posterior cervical adenopathy.      Upper Body:      Right upper body: No supraclavicular adenopathy.      Left upper body: No supraclavicular adenopathy.   Skin:     General: Skin is warm and dry.   Neurological:      Mental Status: She is alert.       Blood pressure 118/70, pulse (!) 57, temperature 98.2 °F (36.8 °C), temperature source Temporal, resp. rate 16, height 5' (1.524 m), weight 63.2 kg (139 lb 3.5 oz), SpO2 97%.Body mass index is 27.19 kg/m².            Assessment & Plan    1. Assessed patient's increasing aggressive behavior and confusion, particularly in evenings  2. Considered safety concerns due to patient's wandering tendencies  3. Evaluated current medication regimen's effectiveness, particularly Sertraline for behavior management  4. Reviewed recent lab results: prediabetes stable (A1C 5.7) and cholesterol levels   5. Continued Livalo for cholesterol management based on satisfactory lipid panel results  6. Considered addition of sleep medication to address nighttime issues  7. Assessed blood pressure control with current regimen of atenolol and diovan HCT  8. Considered splitting valsartan HCT into individual components for easier administration    FRONTOTEMPORAL DEMENTIA:  - Discussed the differences between this type of dementia and Alzheimer's disease.  - Explained the potential need for increased supervision as the condition progresses.  - Started Trazodone 50 mg at bedtime for sleep, with potential to increase dose if needed.  - Continued Sertraline in the morning.  - Referred patient back to Dr. Yin (neurology) for follow-up and potential medication adjustments for behavioral symptoms.  - Renewed medical alert bracelet for patient.  - Looked into GPS bracelet options for wandering prevention.  -I did encourage and the start looking for assisted care living  facilities    HYPERLIPIDEMIA:  - Continued Livalo (generic) at night for cholesterol management.    HYPERTENSION:  - Continued atenolol for blood pressure management.  - Continued diovan HCT in the morning for blood pressure management but they will check with the pharmacy to see if we split this into individual components of the pill size would improve.    PRE DIABETES:  -recheck labs in 1 year    FOLLOW-UP AND CARE COORDINATION:  - Instructed to contact the office if Dr. Yin' team has not scheduled a neurology follow-up visit within a week.  - Instructed to contact the office about any changes in medication administration or if smaller pill options are found for blood pressure medications.         This note was generated with the assistance of ambient listening technology. Verbal consent was obtained by the patient and accompanying visitor(s) for the recording of patient appointment to facilitate this note. I attest to having reviewed and edited the generated note for accuracy, though some syntax or spelling errors may persist. Please contact the author of this note for any clarification.

## 2024-12-23 ENCOUNTER — HOME CARE VISIT (OUTPATIENT)
Dept: NEUROLOGY | Facility: HOSPITAL | Age: 74
End: 2024-12-23
Payer: MEDICARE

## 2024-12-23 NOTE — PROGRESS NOTES
"PWD and CG/ at home doing well today, PWD still lying in her bed resting, paid sitter here today and comes twice a week.  very stressed with care and changes with his wife. CG agreed PWD would benefit in starting on some calming medications due to aggressive and resistant behavior and will mention this at the next neurology appointment. CG states he is not ready to utilize toilet and tub chairs or most assistive devices because he wants to keep things "as regular" as he can for the moment. Nurse encouraged him to start using assistive devices to prevent bodily harm to himself (back). CG also reluctant to ask his daughters for help not to burden them. CG is also on medications for anxiety with all that is going on and is mildly limited physically.    "

## 2025-01-01 ENCOUNTER — PATIENT OUTREACH (OUTPATIENT)
Dept: NEUROLOGY | Facility: CLINIC | Age: 75
End: 2025-01-01
Payer: MEDICARE

## 2025-01-01 DIAGNOSIS — G31.09 OTHER MODERATE FRONTOTEMPORAL DEMENTIA WITH ANXIETY: Primary | ICD-10-CM

## 2025-01-01 DIAGNOSIS — F02.B4 OTHER MODERATE FRONTOTEMPORAL DEMENTIA WITH ANXIETY: Primary | ICD-10-CM

## 2025-01-06 ENCOUNTER — PATIENT OUTREACH (OUTPATIENT)
Dept: NEUROLOGY | Facility: CLINIC | Age: 75
End: 2025-01-06
Payer: MEDICARE

## 2025-01-06 DIAGNOSIS — G31.09 OTHER MODERATE FRONTOTEMPORAL DEMENTIA WITH ANXIETY: Primary | ICD-10-CM

## 2025-01-06 DIAGNOSIS — F02.B4 OTHER MODERATE FRONTOTEMPORAL DEMENTIA WITH ANXIETY: Primary | ICD-10-CM

## 2025-01-06 PROCEDURE — G0520 PR MGMT NEW PT-CAREGIVER DYAD, WITH DEMENTIA, MODERATE COMPLEXITY: HCPCS | Mod: 95,,, | Performed by: STUDENT IN AN ORGANIZED HEALTH CARE EDUCATION/TRAINING PROGRAM

## 2025-01-07 NOTE — PROGRESS NOTES
"Dementia Care Program - GUIDE  Care Navigator - Monthly Follow Up      Virtual Visit - Telehealth  The patient location is: Home  The chief complaint leading to consultation is: GUIDE Model Program  Visit type: Audio Only       Date of Service: 2025  Enrollment date: 10/30/24  Program Status: Active  CMS Complexity Tier: Moderate Complexity Dyad - First 6M -   Was Respite Approved? Yes    Care Navigator completing this form: Annette Weinberg  PCP: Kourtney Yi MD  Care Team: Patient Care Team:  Kourtney Yi MD as PCP - General (Family Medicine)  Wicho Rico MD as Consulting Physician (Gastroenterology)  Eveline Soria, RN (Inactive) as Registered Nurse (Family Medicine)  Osmany Connelly MD as Consulting Physician (Colon and Rectal Surgery)  Adama Nassar MD as Consulting Physician (Neurology)  Tej Kirby OD as Consulting Physician (Optometry)  Annette Weinberg LCSW as  (Neurology)  Kathy Miller MD as Consulting Physician (Internal Medicine)     Patient: Cindy Escobedo  MRN: [id number]  : 1950  Age: 74 y.o.  Gender: female  Race: White  Ethnicity: Not  or /a    Visit: Care Navigator/Social Work follow up as part of the GUIDE dementia care management program.        ASSESSMENT & CARE PLAN     Cindy Navarro" was seen today for dementia.    Diagnoses and all orders for this visit:    Other moderate frontotemporal dementia with anxiety         Spoke with Caregiver Santiago Lockhart  (Spouse)          2025   Care Plan   Behavior Management Agitation/Aggression   Comments Still gets very distressed about coming home, refuses to get out of car or exits and wanders down street, into neighbors' yards; wants to "go home"   Medication Compliance & Optimization   Comments Started Trazedone; has helped with sleep. Advised cg he could inquire with neurologist about meds for agitation at next appt.   Referrals Sitter Services   Comments "  reported he has not yet heard from [facility] for respite benefit, despite referral being sent in 6+ weeks ago; advised ; gave nu direct # to contact company. Current sitters comes 2/week, but cg does not always leave house; discussed importance of actually getting away.   Other Education, Support, & Resources Other (details in comment box)           CTN/SW Action Items:       Plan: Monthly follow up for dementia care management as part of the GUIDE Model.      Next visit scheduled for: 2/10/25 at 1:00 PM. Caregiver knows how to reach CTN/SW and is encouraged to do so before the next scheduled call, if needed.      SUBJECTIVE      noted some difference in approach from dtrs, with dtrs encouraging him to be calm, non-direct, not trying using logic, etc.  stated he understands all this; but he continues to revert to approaches that are likely not helping to de-escalate pt.    and fmly considering placement        MONTHLY TRACKING         12/2/2024   [place] MONTHLY TRACKING   Falls 1

## 2025-01-29 ENCOUNTER — OFFICE VISIT (OUTPATIENT)
Dept: NEUROLOGY | Facility: CLINIC | Age: 75
End: 2025-01-29
Payer: MEDICARE

## 2025-01-29 VITALS
WEIGHT: 131.06 LBS | SYSTOLIC BLOOD PRESSURE: 104 MMHG | BODY MASS INDEX: 25.73 KG/M2 | DIASTOLIC BLOOD PRESSURE: 60 MMHG | HEART RATE: 53 BPM | HEIGHT: 60 IN

## 2025-01-29 DIAGNOSIS — F02.B4: Primary | ICD-10-CM

## 2025-01-29 PROCEDURE — 99499 UNLISTED E&M SERVICE: CPT | Mod: S$PBB,,, | Performed by: PSYCHIATRY & NEUROLOGY

## 2025-01-29 PROCEDURE — 99213 OFFICE O/P EST LOW 20 MIN: CPT | Mod: PBBFAC,PO | Performed by: PSYCHIATRY & NEUROLOGY

## 2025-01-29 PROCEDURE — 99483 ASSMT & CARE PLN PT COG IMP: CPT | Mod: S$PBB,,, | Performed by: PSYCHIATRY & NEUROLOGY

## 2025-01-29 PROCEDURE — 99999 PR PBB SHADOW E&M-EST. PATIENT-LVL III: CPT | Mod: PBBFAC,,, | Performed by: PSYCHIATRY & NEUROLOGY

## 2025-01-29 RX ORDER — MELATONIN 5 MG
5 CAPSULE ORAL NIGHTLY
COMMUNITY

## 2025-01-29 RX ORDER — DIVALPROEX SODIUM 250 MG/1
250 TABLET, FILM COATED, EXTENDED RELEASE ORAL DAILY
Qty: 30 TABLET | Refills: 11 | Status: SHIPPED | OUTPATIENT
Start: 2025-01-29 | End: 2026-01-29

## 2025-01-29 NOTE — PROGRESS NOTES
Date: 1/29/2025    Patient ID: Cindy Escobedo is a 74 y.o. female.    Chief Complaint: Memory Loss and Follow-up      History of Present Illness:  Ms. Escobedo is a 74 y.o. female who presents for followup of dementia, PPA. The patient was accompanied by her  and daughter who also contributed to the following history.      Interval history: Our last appointment was in 2022 and she has not followed up since that time.  She has unfortunately continued to decline over the years. She has been having more behavioral issues lately which is why they came back in for followup today.     She has had to stay at home as she was not doing well when leaving and coming back. She has been more agitated and prone to wander off. She has been walking about the house and carrying things around and confused. She doesn't want to take a bath. She is irritated and argumentative. She also then won't want to get out of the tub.     She is living at home with her  and has a sitter coming in 3 days a week. The daughter helps out too. .     She sometimes refuses her zoloft medication. She just started trazodone at night.     Prior HPI:  She was previously followed by Dr. Rossi and diagnosed with primary progressive aphasia in about 2014. She had difficulty with words and conversations in a progressively worsening manner. She is not on aricept or namenda. She was on aricept but this was stopped due to nasal dripping. Memantine was stopped as well.      Her  notes that she is progressively worsening. He notes trouble with understanding instructions too She has some trouble eating as well. She has trouble using the utensils. He helps her with bathing and getting dressed too. She has trouble opening canned drinks.      She gets sad or depressed occasionally. Not as much crying.      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 March 2022 CTA showed stable 6 mm aneurysm. Dr. Hudson did a carotid US.     "    Allergies:  Review of patient's allergies indicates:   Allergen Reactions    Codeine Anaphylaxis    Ace inhibitors      Other reaction(s): cough    Atorvastatin Other (See Comments)     Myalgias    Cat hair extract      Other reaction(s): Sneezing  Other reaction(s): Rash    Epinephrine      Other reaction(s): Tachycardia  Other reaction(s): Tachycardia    Hydrocodone-acetaminophen Other (See Comments)     Other reaction(s): Anaphylaxis    Oxycodone hcl-oxycodone-asa      Other reaction(s): Agitation  Other reaction(s): Agitation    Oxytetracycline      Other reaction(s): Fainting  Other reaction(s): Fainting    Rosuvastatin Other (See Comments)     Other reaction(s): Muscle pain    Sulfamethoxazole-trimethoprim      Other reaction(s): "Cracked lips"  Other reaction(s): "Cracked lips"       Current Medications:  Current Outpatient Medications   Medication Sig Dispense Refill    aspirin (ECOTRIN) 81 MG EC tablet Take 81 mg by mouth once daily.      atenoloL (TENORMIN) 25 MG tablet TAKE 1 TABLET BY MOUTH EVERY EVENING 180 tablet 4    melatonin 5 mg Cap Take 5 mg by mouth every evening.      pitavastatin calcium (LIVALO) 2 mg Tab tablet Take 1 tablet (2 mg total) by mouth every evening. 90 tablet 3    sertraline (ZOLOFT) 100 MG tablet TAKE 1 TABLET(100 MG) BY MOUTH EVERY DAY 90 tablet 3    traZODone (DESYREL) 50 MG tablet Take 1 tablet (50 mg total) by mouth every evening. 30 tablet 1    valsartan-hydrochlorothiazide (DIOVAN-HCT) 320-12.5 mg per tablet TAKE 1 TABLET BY MOUTH EVERY MORNING 90 tablet 4    divalproex ER (DEPAKOTE ER) 250 MG 24 hr tablet Take 1 tablet (250 mg total) by mouth once daily. 30 tablet 11     No current facility-administered medications for this visit.       Past Medical History:  Past Medical History:   Diagnosis Date    Allergy     Anticoagulant long-term use     Carotid arterial disease     s/p CEA 2020    Cataract     Depression     Diverticular disease     noted on cscope    Encounter " for blood transfusion     Fatty liver     Frontotemporal dementia     language variant    HEARING LOSS     History of esophagitis     noted on 8/17 EGD    History of gastritis     noted on EGD    HTN (hypertension)     Hyperlipidemia     FARA (obstructive sleep apnea)     no CPAP    Osteoarthritis     elevated CRP    Palpitations 05/07/2012    05/10/2001 all coronaries normal;      Prediabetes     Primary progressive aphasia     Schatzki's ring     s/p dilation 8/17    TIA (transient ischemic attack)     Valvular heart disease     mod DE 4/16    Vertebral artery aneurysm 03/28/2013 7/2012 angio 4.5 millimeter diameter fusiform left V4 segment aneurysm Minimal intracranial atherosclerosis        Past Surgical History:  Past Surgical History:   Procedure Laterality Date    APPENDECTOMY      BLADDER SUSPENSION  1993    BREAST BIOPSY Left     neg    CAROTID ENDARTERECTOMY Right 08/17/2020    Procedure: ENDARTERECTOMY-CAROTID;  Surgeon: Melisa Hudson MD;  Location: [facility];  Service: Cardiovascular;  Laterality: Right;    CARPAL TUNNEL RELEASE      right    CERVICAL FUSION      seen Dr. Raymond patricia removed-RUL      CHOLECYSTECTOMY      DILATION AND CURETTAGE OF UTERUS      esophageal hernia repair      ESOPHAGUS SURGERY      HYSTERECTOMY      due to excessive bleeding    JOINT REPLACEMENT      partial left knee    KNEE ARTHROSCOPY      left    SINUS SURGERY      TONSILLECTOMY, ADENOIDECTOMY, BILATERAL MYRINGOTOMY AND TUBES      UVULOPALATOPHARYNGOPLASTY AND SEPTOPLASTY      VAGINAL DELIVERY      times 2       Family History:  family history includes Breast cancer in her maternal grandmother; COPD in her mother; Cancer in her maternal grandmother; Cataracts in her sister; Glaucoma in her sister; Stroke in her father; Uveitis in her sister and sister.    Social History:   reports that she quit smoking about 50 years ago. Her smoking use included cigarettes. She has never used smokeless tobacco. She reports that she  does not drink alcohol and does not use drugs.    Physical Exam:  Vitals:    01/29/25 1052   BP: 104/60   Pulse: (!) 53   Weight: 59.5 kg (131 lb 1 oz)   Height: 5' (1.524 m)   PainSc: 0-No pain     Body mass index is 25.6 kg/m².    Neurological Exam:  Mental status: Awake and alert. MMSE attempted but 0/30 due to severe aphasia  Speech language: severe aphasia, more expressive than receptive  Cranial nerves: Face symmetric  Motor: Moves all extremities well  Coordination: No ataxia. No tremor.      Data:  I have personally reviewed other provider's notes, labs, & imaging made available to me today.     Labs:  CBC:   Lab Results   Component Value Date    WBC 5.48 12/10/2024    HGB 14.2 12/10/2024    HCT 41.8 12/10/2024     12/10/2024    MCV 92 12/10/2024    RDW 13.2 12/10/2024     BMP:   Lab Results   Component Value Date     12/10/2024    K 4.2 12/10/2024     12/10/2024    CO2 25 12/10/2024    BUN 13 12/10/2024    CREATININE 0.9 12/10/2024     (H) 12/10/2024    CALCIUM 9.4 12/10/2024    PHOS 2.2 (L) 08/18/2020     LFTS;   Lab Results   Component Value Date    PROT 7.2 12/10/2024    ALBUMIN 3.8 12/10/2024    BILITOT 1.6 (H) 12/10/2024    AST 20 12/10/2024    ALKPHOS 85 12/10/2024    ALT 21 12/10/2024     COAGS:   Lab Results   Component Value Date    INR 1.0 08/14/2020     FLP:   Lab Results   Component Value Date    CHOL 147 12/10/2024    HDL 35 (L) 12/10/2024    LDLCALC 87.0 12/10/2024    TRIG 125 12/10/2024    CHOLHDL 23.8 12/10/2024       Assessment and Plan:  Ms. Escobedo is a 74 y.o. female here for followup of dementia, PPA felt likely. Unfortunately this is progressing. She has developed more behavioral change and agitation. Advised they try to give the zoloft more regularly and we could increase trazodone for sleep as well. But for the agitation will add depakote  mg daily. Can increase it as needed/tolerated. Consider seroquel as next line if needed.     Will see her back in  2-3 months virtually for med check and then in 6 months for memory clinic.       Dementia in other diseases classified elsewhere, moderate, with anxiety    Other orders  -     divalproex ER (DEPAKOTE ER) 250 MG 24 hr tablet; Take 1 tablet (250 mg total) by mouth once daily.  Dispense: 30 tablet; Refill: 11       Cognition and function were assessed and the patient's functional assessment staging test (FAST) score is 7b. Patient is felt to not have decision making capacity. PHQ-2 score was 0. Medications were reconciled and reviewed for high-risk medications. The patient's behavior and psychiatric health were reviewed and addressed. The patient and family were counseled on safety in the home and operation of vehicles. Discussed caregiver needs and social support. Advance Care Plan was reviewed. Written care plan and support information provided to the patient or caregiver and information was provided.        Caregiver name: Santiago Escobedo  Relationship to the patient: spouse  Does the patient have a living will? Yes  Does the patient have a designated healthcare POA? Yes  Does the patient have a designated general POA? Yes     Have educational materials/resources been provided? Yes        Activities of Daily Living     Bathing: Cannot Do  Dressing: Cannot Do  Grooming: Cannot Do  Mouth Care: Cannot Do  Toileting: Cannot Do  Transferring Bed/Chair: Needs Help  Walking: Independent  Climbing: Independent  Eating: Needs Help        Instrumental Activities of Daily Living     Shopping: Cannot Do  Cooking: Cannot Do  Managing Medications: Cannot Do  Using the phone and looking up numbers: Cannot Do  Doing Housework: Cannot Do  Doing Laundry: Cannot Do  Driving or using public transportation: Cannot Do  Managing finances: Cannot Do     Functional Assessment Staging  7b   Speech ability limited to the use of a single intelligible word in an average day or in the course of an interview (the person may repeat the word over and  over).             1/29/2025    10:48 AM 6/18/2024     1:47 PM 2/16/2024     9:26 AM 11/8/2021    12:12 PM 11/5/2020    10:20 AM 1/29/2016     9:00 AM 5/6/2015     2:00 PM   Depression Patient Health Questionnaire   Over the last two weeks how often have you been bothered by little interest or pleasure in doing things Not at all Several days Not at all Not at all Not at all Several days Not at all   Over the last two weeks how often have you been bothered by feeling down, depressed or hopeless Not at all Several days Not at all Not at all Not at all Several days Not at all   PHQ-2 Total Score 0 2 0 0 0 2

## 2025-01-29 NOTE — PROGRESS NOTES
Caregiver name: Santiago Escobedo  Relationship to the patient: spouse  Does the patient have a living will? Yes  Does the patient have a designated healthcare POA? Yes  Does the patient have a designated general POA? Yes    Have educational materials/resources been provided? Yes      Activities of Daily Living    Bathing: Cannot Do  Dressing: Cannot Do  Grooming: Cannot Do  Mouth Care: Cannot Do  Toileting: Cannot Do  Transferring Bed/Chair: Needs Help  Walking: Independent  Climbing: Independent  Eating: Needs Help      Instrumental Activities of Daily Living    Shopping: Cannot Do  Cooking: Cannot Do  Managing Medications: Cannot Do  Using the phone and looking up numbers: Cannot Do  Doing Housework: Cannot Do  Doing Laundry: Cannot Do  Driving or using public transportation: Cannot Do  Managing finances: Cannot Do    Functional Assessment Staging  7b   Speech ability limited to the use of a single intelligible word in an average day or in the course of an interview (the person may repeat the word over and over).             1/29/2025    10:48 AM 6/18/2024     1:47 PM 2/16/2024     9:26 AM 11/8/2021    12:12 PM 11/5/2020    10:20 AM 1/29/2016     9:00 AM 5/6/2015     2:00 PM   Depression Patient Health Questionnaire   Over the last two weeks how often have you been bothered by little interest or pleasure in doing things Not at all Several days Not at all Not at all Not at all Several days Not at all   Over the last two weeks how often have you been bothered by feeling down, depressed or hopeless Not at all Several days Not at all Not at all Not at all Several days Not at all   PHQ-2 Total Score 0 2 0 0 0 2 0

## 2025-02-07 ENCOUNTER — PATIENT OUTREACH (OUTPATIENT)
Dept: NEUROLOGY | Facility: CLINIC | Age: 75
End: 2025-02-07
Payer: MEDICARE

## 2025-02-19 ENCOUNTER — CLINICAL SUPPORT (OUTPATIENT)
Dept: NEUROLOGY | Facility: CLINIC | Age: 75
End: 2025-02-19
Payer: MEDICARE

## 2025-02-19 ENCOUNTER — PATIENT OUTREACH (OUTPATIENT)
Dept: NEUROLOGY | Facility: CLINIC | Age: 75
End: 2025-02-19
Payer: MEDICARE

## 2025-02-19 DIAGNOSIS — G31.09 OTHER MODERATE FRONTOTEMPORAL DEMENTIA WITH ANXIETY: Primary | ICD-10-CM

## 2025-02-19 DIAGNOSIS — F02.B4 OTHER MODERATE FRONTOTEMPORAL DEMENTIA WITH ANXIETY: Primary | ICD-10-CM

## 2025-02-19 NOTE — PROGRESS NOTES
"Dementia Care Program - GUIDE  Care Navigator - Monthly Follow Up      Virtual Visit - Telehealth  The patient location is: Home  The chief complaint leading to consultation is: GUIDE Model Program  Visit type: Audio Only       Date of Service: 2025  Enrollment date: 10/30/24  Program Status: Active  CMS Complexity Tier: Moderate Complexity Dyad - First 6M -   Was Respite Approved? Yes    Care Navigator completing this form: Annette Weinberg  PCP: Kourtney Yi MD  Care Team: Patient Care Team:  Kourtney Yi MD as PCP - General (Family Medicine)  Wicho Rico MD as Consulting Physician (Gastroenterology)  Eveline Soria, RN (Inactive) as Registered Nurse (Family Medicine)  Osmany Connelly MD (Inactive) as Consulting Physician (Colon and Rectal Surgery)  Adama Nassar MD as Consulting Physician (Neurology)  Tej Kirby OD as Consulting Physician (Optometry)  Annette Weinberg LCSW as  (Neurology)  Kathy Miller MD as Consulting Physician (Internal Medicine)     Patient: Cindy Escobedo  MRN: [id number]  : 1950  Age: 74 y.o.  Gender: female  Race: White  Ethnicity: Not  or /a    Visit: Care Navigator/Social Work follow up as part of the GUIDE dementia care management program.        ASSESSMENT & CARE PLAN     Cindy Navarro" was seen today for dementia.    Diagnoses and all orders for this visit:    Other moderate frontotemporal dementia with anxiety         Spoke with Caregiver  Santiago  (Spouse)          2025   Care Plan   Behavior Management Nighttime Behaviors;Irritability/Lability   Comments Not sleeping well. Restless, agitated; carries items around the house; explored possible need to be more engaged, more sense of purpose; discussed activities such as folding towels; and need for  to let go of things being done "correctly" and instead see the benefit of pt being busy/engaged for its own wake.   Caregiver " Wellness Supportive Counseling   Comments  is stressed, frustrated.   Medication Compliance & Optimization   Comments Pt has been refusing to take meds; advised  to check with doctor or pharmacist about safety/effectiveness of meds if crushed, then proceed accordingly to place meds in food/beverages. Discussed de-prescribing;  may address with providers.   Prevention & Safety Wandering   Comments Pt sometimes tried to leave property; [facility] has created system with locks.   Other Education, Support, & Resources Other (details in comment box)           CTN/SW Action Items:   Advise  that  is already hiring sitters through [facility], so wants to use them for GUIDE respite program - done    Plan: Monthly follow up for dementia care management as part of the GUIDE Model.      Next visit scheduled for: 3/19/25 at 1:00 PM. Caregiver knows how to reach CTN/SW and is encouraged to do so before the next scheduled call, if needed.      SUBJECTIVE      reports GUIDE program is helping        MONTHLY TRACKING         12/2/2024   DC MONTHLY TRACKING   Falls 1                       00}

## 2025-02-21 ENCOUNTER — PATIENT OUTREACH (OUTPATIENT)
Dept: NEUROLOGY | Facility: CLINIC | Age: 75
End: 2025-02-21
Payer: MEDICARE

## 2025-02-21 DIAGNOSIS — F02.B4 OTHER MODERATE FRONTOTEMPORAL DEMENTIA WITH ANXIETY: Primary | ICD-10-CM

## 2025-02-21 DIAGNOSIS — G31.09 OTHER MODERATE FRONTOTEMPORAL DEMENTIA WITH ANXIETY: Primary | ICD-10-CM

## 2025-02-27 ENCOUNTER — PATIENT OUTREACH (OUTPATIENT)
Dept: NEUROLOGY | Facility: CLINIC | Age: 75
End: 2025-02-27
Payer: MEDICARE

## 2025-02-27 NOTE — PROGRESS NOTES
"Phoned  to update on respite agency status.  understands [facility] will be provider.     Discussed pt's recent ER visit, due to pt "acting out," which  had thought would result in further evaluation for psychiatric stay, but learned while there that the fmly would have had to take pt to a psych facility for eval.     also very upset due to being in chronic pain resulting in ability to sleep; feeling overwhelmed.  Will be starting back on Ambien.  "

## 2025-03-06 ENCOUNTER — TELEPHONE (OUTPATIENT)
Dept: FAMILY MEDICINE | Facility: CLINIC | Age: 75
End: 2025-03-06
Payer: MEDICARE

## 2025-03-06 NOTE — TELEPHONE ENCOUNTER
----- Message from Yari sent at 3/6/2025  2:00 PM CST -----  Contact: walk in  Pt's daughter, Ihsan Coburn, dropped off [employer] paperwork to be completed.  States pt was recently seen by Dr. Yi and asking if able to fill out without another appt.  Pt does have an appt with Cecily on April 2nd, 1st appt available in case it's needed.  [facility] call to advise.  [phone]

## 2025-03-12 ENCOUNTER — TELEPHONE (OUTPATIENT)
Dept: FAMILY MEDICINE | Facility: CLINIC | Age: 75
End: 2025-03-12
Payer: MEDICARE

## 2025-03-12 DIAGNOSIS — F02.80 FRONTOTEMPORAL DEMENTIA: Primary | ICD-10-CM

## 2025-03-12 DIAGNOSIS — G31.09 FRONTOTEMPORAL DEMENTIA: Primary | ICD-10-CM

## 2025-03-12 NOTE — TELEPHONE ENCOUNTER
Spoke to Katy at [facility], she advised that pt is medicare.  What VA paperwork are you referring to?  Faxed referral to them.

## 2025-03-12 NOTE — TELEPHONE ENCOUNTER
----- Message from Emperatriz sent at 3/12/2025 12:11 PM CDT -----  Contact: [phone]  Patient  .[email or web address] Patient is calling for Medical Advice regarding: Pts daughter is calling in regards to the VA paperwork that was dropped off for the doctor to fill out last week. Pts daughter is also calling in regards to needing whatever [facility] needs to admit the pt today. Please call and advise ASAP. Thank you. Please make sure to call Pts daughter at . Thank youHow long has patient had these symptoms:N/APharmacy name and phone#:N/APatient wants a call back or thru [facility]:Comments:Please advise patient replies from provider may take up to 48 hours.

## 2025-03-12 NOTE — TELEPHONE ENCOUNTER
Spoke to pt's daughter.  She advised that they have not decided on a facility yet.  States they are needing the VA forms completed to get some financial reimbursement for the facility.  Are you able to complete the forms?

## 2025-03-12 NOTE — TELEPHONE ENCOUNTER
So she's not doing hospice or will be doing hospice in assisted living facility?    Will you ask family if they still need VA forms?

## 2025-03-12 NOTE — TELEPHONE ENCOUNTER
----- Message from Aviva sent at 3/12/2025 10:16 AM CDT -----  Regarding: orders for hospice  Contact: isidro from intake dept of [facility] hospice  Type:  Needs Medical AdviceWho Called: isidro from intake dept of [facility] hospiceBest Call Back Number:  [phone] phone for isidro from [facility] dept of [facility] hospiceAdditional Information: pt's  signed consents for hospice.  They are requesting orders for hospice and will be faxing the doctor today.  Please fax back to

## 2025-03-12 NOTE — TELEPHONE ENCOUNTER
Found the forms you were referring to, [facility] at [facility].  She advised that the family is getting her placed at the [facility] for assisted living.  I left forms in your box but it looks like they are unnecessary now.

## 2025-03-19 ENCOUNTER — PATIENT OUTREACH (OUTPATIENT)
Dept: NEUROLOGY | Facility: CLINIC | Age: 75
End: 2025-03-19
Payer: MEDICARE

## 2025-03-19 DIAGNOSIS — F02.B4 OTHER MODERATE FRONTOTEMPORAL DEMENTIA WITH ANXIETY: Primary | ICD-10-CM

## 2025-03-19 DIAGNOSIS — G31.09 OTHER MODERATE FRONTOTEMPORAL DEMENTIA WITH ANXIETY: Primary | ICD-10-CM

## 2025-03-19 NOTE — PROGRESS NOTES
"Dementia Care Program - GUIDE  Care Navigator - Monthly Follow Up      Virtual Visit - Telehealth  The patient location is: Home  The chief complaint leading to consultation is: GUIDE Model Program  Visit type: Audio Only       Date of Service: 2025  Enrollment date: 10/30/24  Program Status: Active  CMS Complexity Tier: Moderate Complexity Dyad - First 6M -   Was Respite Approved? Yes    Care Navigator completing this form: Annette Weinberg  PCP: Kourtney Yi MD  Care Team: Patient Care Team:  Kourtney Yi MD as PCP - General (Family Medicine)  Wicho Rico MD as Consulting Physician (Gastroenterology)  Eveline Soria, RN (Inactive) as Registered Nurse (Family Medicine)  Osmany Connelly MD (Inactive) as Consulting Physician (Colon and Rectal Surgery)  Adama Nassar MD as Consulting Physician (Neurology)  Tej Kirby OD as Consulting Physician (Optometry)  Annette Weinberg LCSW as  (Neurology)  Kathy Miller MD as Consulting Physician (Internal Medicine)     Patient: Cindy Escobedo  MRN: [id number]  : 1950  Age: 75 y.o.  Gender: female  Race: White  Ethnicity: Not  or /a    Visit: Care Navigator/Social Work follow up as part of the GUIDE dementia care management program.        ASSESSMENT & CARE PLAN     Cindy Navarro" was seen today for dementia.    Diagnoses and all orders for this visit:    Other moderate frontotemporal dementia with anxiety         Spoke with Caregiver Santiago Escobedo  (Spouse)          3/19/2025   Care Plan   Behavior Management Agitation/Aggression;Irritability/Lability;Appetite/Eating   Comments Pt was taken to ER on 3/14 for AMS then transferred to [facility]; had been refusing to eat for several days, not sleeping, increasingly combative, roaming around house   Caregiver Wellness Supportive Counseling   Comments  tearful, but stated he is realistic, understands disease will keep getting worse. "   Prevention & Safety Urinary Tract Infections;Hospital and ED Utilization   Comments ER noties indicate suspected UTI;  stated doctors hoped to get a better specimen to identify the bacteria. Pt went to ER, then [facility]; plan is to place in Memory Care.   Advanced Care Planning Goals of Care   Comments Discussed priority of pt being as comfortable and content as possible.   Other Education, Support, & Resources Other (details in comment box)           CTN/SW Action Items:    plans to call me in two weeks (~4/2/25) to update me as to pt's status with Memory Care placement; dis-enroll at that time when placed in Memory Care.    Plan: Until time of placement in Memory Care (see above), Monthly follow up for dementia care management as part of the GUIDE Model.      Next visit scheduled for:  plans to call me in 2 weeks to update as to whether pt has been transferred to Memory Care. Caregiver knows how to reach CTN/SW and is encouraged to do so before the next scheduled call, if needed.      SUBJECTIVE     's plan is for pt to be placed at [facility], hoping she can be transferred directly from [facility]. Advised  that once pt is in Memory Care (and/or on hospice care) that she would be dis-enrolled from GUIDE program, but that my supports would still be available on a prn basis.        MONTHLY TRACKING         3/19/2025 12/2/2024   RI MONTHLY TRACKING   Falls  1   UTIs 1

## 2025-04-04 ENCOUNTER — PATIENT OUTREACH (OUTPATIENT)
Dept: ADMINISTRATIVE | Facility: CLINIC | Age: 75
End: 2025-04-04
Payer: MEDICARE

## 2025-04-09 ENCOUNTER — PATIENT OUTREACH (OUTPATIENT)
Dept: NEUROLOGY | Facility: CLINIC | Age: 75
End: 2025-04-09
Payer: MEDICARE

## 2025-04-24 NOTE — PROGRESS NOTES
Dementia Care Program   GUIDE - Respite Documentation        Visit Type: Documentation Only  Respite Service Location: Home     Date of Service: 3/10/2025  Enrollment date:  2024-10-30  Program Status: Active  CMS Complexity Tier: Moderate Complexity Dyad - First  -   Was Respite Approved? Yes    Care Navigator completing this form: Vanessa Herrera  PCP: Kourtney Yi MD  Care Team: Patient Care Team:  Kourtney Yi MD as PCP - General (Family Medicine)  Wicho Rico MD as Consulting Physician (Gastroenterology)  Eveline Soria, RN (Inactive) as Registered Nurse (Family Medicine)  Osmany Connelly MD (Inactive) as Consulting Physician (Colon and Rectal Surgery)  Adama Nassar MD as Consulting Physician (Neurology)  Tej Kirby OD as Consulting Physician (Optometry)  Annette Weinberg LCSW as  (Neurology)  Kathy Miller MD as Consulting Physician (Internal Medicine)     Patient: Cindy Escobedo  MRN: [id number]  : 1950  Age: 75 y.o.  Gender: female  Race: White  Ethnicity: Not  or /a     Objective: GUIDE In-Home Respite Service Documentation.        ASSESSMENT & PLAN   Diagnoses and all orders for this visit:    Other moderate frontotemporal dementia with anxiety          Plan: Dyad was provided with in-home respite for a 4-hour service unit by our partner organization as part of the GUIDE Model.

## 2025-04-24 NOTE — PROGRESS NOTES
Dementia Care Program   GUIDE - Respite Documentation        Visit Type: Documentation Only  Respite Service Location: Home     Date of Service: 3/12/2025  Enrollment date:  2024-10-30  Program Status: Active  CMS Complexity Tier: Moderate Complexity [facility] - First  -   Was Respite Approved? Yes    Care Navigator completing this form: Vanessa Herrera  PCP: Kourtney Yi MD  Care Team: Patient Care Team:  Kourtney Yi MD as PCP - General (Family Medicine)  Wicho Rico MD as Consulting Physician (Gastroenterology)  Eveline Soria, RN (Inactive) as Registered Nurse (Family Medicine)  Osmany Connelly MD (Inactive) as Consulting Physician (Colon and Rectal Surgery)  Adama Nassar MD as Consulting Physician (Neurology)  Tej Kirby OD as Consulting Physician (Optometry)  Annette Weinberg LCSW as  (Neurology)  Kathy Miller MD as Consulting Physician (Internal Medicine)     Patient: Cindy Escobedo  MRN: [id number]  : 1950  Age: 75 y.o.  Gender: female  Race: White  Ethnicity: Not  or /a     Objective: GUIDE In-Home Respite Service Documentation.        ASSESSMENT & PLAN   Diagnoses and all orders for this visit:    Other moderate frontotemporal dementia with anxiety          Plan: [facility] was provided with in-home respite for a 4-hour service unit by our partner organization as part of the GUIDE Model.